# Patient Record
Sex: MALE | Race: WHITE
[De-identification: names, ages, dates, MRNs, and addresses within clinical notes are randomized per-mention and may not be internally consistent; named-entity substitution may affect disease eponyms.]

---

## 2017-09-05 ENCOUNTER — RX RENEWAL (OUTPATIENT)
Age: 82
End: 2017-09-05

## 2017-09-18 ENCOUNTER — NON-APPOINTMENT (OUTPATIENT)
Age: 82
End: 2017-09-18

## 2017-09-18 ENCOUNTER — APPOINTMENT (OUTPATIENT)
Dept: CARDIOLOGY | Facility: CLINIC | Age: 82
End: 2017-09-18

## 2017-09-18 VITALS
HEART RATE: 56 BPM | DIASTOLIC BLOOD PRESSURE: 66 MMHG | BODY MASS INDEX: 22.9 KG/M2 | OXYGEN SATURATION: 100 % | SYSTOLIC BLOOD PRESSURE: 124 MMHG | WEIGHT: 160 LBS | HEIGHT: 70 IN

## 2017-10-09 ENCOUNTER — APPOINTMENT (OUTPATIENT)
Dept: CARDIOLOGY | Facility: CLINIC | Age: 82
End: 2017-10-09
Payer: MEDICARE

## 2017-10-09 VITALS
HEART RATE: 57 BPM | WEIGHT: 160 LBS | BODY MASS INDEX: 22.9 KG/M2 | SYSTOLIC BLOOD PRESSURE: 126 MMHG | DIASTOLIC BLOOD PRESSURE: 64 MMHG | HEIGHT: 70 IN

## 2017-10-09 VITALS — RESPIRATION RATE: 100 BRPM

## 2017-10-09 PROCEDURE — 99215 OFFICE O/P EST HI 40 MIN: CPT

## 2017-10-09 PROCEDURE — 93000 ELECTROCARDIOGRAM COMPLETE: CPT

## 2017-10-17 ENCOUNTER — FORM ENCOUNTER (OUTPATIENT)
Age: 82
End: 2017-10-17

## 2017-10-18 ENCOUNTER — OUTPATIENT (OUTPATIENT)
Dept: OUTPATIENT SERVICES | Facility: HOSPITAL | Age: 82
LOS: 1 days | Discharge: HOME | End: 2017-10-18
Payer: COMMERCIAL

## 2017-10-18 DIAGNOSIS — R01.1 CARDIAC MURMUR, UNSPECIFIED: ICD-10-CM

## 2017-10-18 PROCEDURE — 93306 TTE W/DOPPLER COMPLETE: CPT | Mod: 26

## 2017-12-07 ENCOUNTER — TRANSCRIPTION ENCOUNTER (OUTPATIENT)
Age: 82
End: 2017-12-07

## 2018-02-15 ENCOUNTER — MEDICATION RENEWAL (OUTPATIENT)
Age: 83
End: 2018-02-15

## 2018-02-16 ENCOUNTER — MEDICATION RENEWAL (OUTPATIENT)
Age: 83
End: 2018-02-16

## 2018-02-26 ENCOUNTER — MEDICATION RENEWAL (OUTPATIENT)
Age: 83
End: 2018-02-26

## 2018-10-16 ENCOUNTER — APPOINTMENT (OUTPATIENT)
Dept: CARDIOLOGY | Facility: CLINIC | Age: 83
End: 2018-10-16

## 2018-11-27 ENCOUNTER — APPOINTMENT (OUTPATIENT)
Dept: CARDIOLOGY | Facility: CLINIC | Age: 83
End: 2018-11-27
Payer: MEDICARE

## 2018-11-27 ENCOUNTER — NON-APPOINTMENT (OUTPATIENT)
Age: 83
End: 2018-11-27

## 2018-11-27 VITALS
WEIGHT: 145 LBS | BODY MASS INDEX: 20.76 KG/M2 | HEART RATE: 58 BPM | HEIGHT: 70 IN | OXYGEN SATURATION: 100 % | DIASTOLIC BLOOD PRESSURE: 73 MMHG | SYSTOLIC BLOOD PRESSURE: 128 MMHG

## 2018-11-27 PROCEDURE — 93000 ELECTROCARDIOGRAM COMPLETE: CPT

## 2018-11-27 PROCEDURE — 99214 OFFICE O/P EST MOD 30 MIN: CPT

## 2018-11-27 NOTE — PHYSICAL EXAM
[General Appearance - Well Developed] : well developed [Normal Appearance] : normal appearance [Well Groomed] : well groomed [General Appearance - Well Nourished] : well nourished [No Deformities] : no deformities [General Appearance - In No Acute Distress] : no acute distress [FreeTextEntry1] : significant weight loss [Normal Conjunctiva] : the conjunctiva exhibited no abnormalities [Eyelids - No Xanthelasma] : the eyelids demonstrated no xanthelasmas [Normal Oral Mucosa] : normal oral mucosa [No Oral Pallor] : no oral pallor [No Oral Cyanosis] : no oral cyanosis [Normal Jugular Venous A Waves Present] : normal jugular venous A waves present [Normal Jugular Venous V Waves Present] : normal jugular venous V waves present [No Jugular Venous [place] A Waves] : no jugular venous [place] A waves [Respiration, Rhythm And Depth] : normal respiratory rhythm and effort [Exaggerated Use Of Accessory Muscles For Inspiration] : no accessory muscle use [Auscultation Breath Sounds / Voice Sounds] : lungs were clear to auscultation bilaterally [Heart Rate And Rhythm] : heart rate and rhythm were normal [Heart Sounds] : normal S1 and S2 [Systolic grade ___/6] : A grade [unfilled]/6 systolic murmur was heard. [Abdomen Soft] : soft [Abdomen Tenderness] : non-tender [Abdomen Mass (___ Cm)] : no abdominal mass palpated [Abnormal Walk] : normal gait [Gait - Sufficient For Exercise Testing] : the gait was sufficient for exercise testing [Nail Clubbing] : no clubbing of the fingernails [Cyanosis, Localized] : no localized cyanosis [Petechial Hemorrhages (___cm)] : no petechial hemorrhages [Skin Color & Pigmentation] : normal skin color and pigmentation [] : no rash [No Venous Stasis] : no venous stasis [Skin Lesions] : no skin lesions [No Skin Ulcers] : no skin ulcer [No Xanthoma] : no  xanthoma was observed [Oriented To Time, Place, And Person] : oriented to person, place, and time [Affect] : the affect was normal [Mood] : the mood was normal [No Anxiety] : not feeling anxious

## 2018-11-27 NOTE — DISCUSSION/SUMMARY
[FreeTextEntry1] : Stable, but clearly in a state of mourning for his wife.  Will see in 4-6 months.

## 2018-11-27 NOTE — HISTORY OF PRESENT ILLNESS
[FreeTextEntry1] : Patient s/p multivessel bypass.  No recurrent ischemic symptoms.  Recently lost his wife.

## 2018-11-27 NOTE — REASON FOR VISIT
[Follow-Up - Clinic] : a clinic follow-up of [Coronary Artery Disease] : coronary artery disease [Hyperlipidemia] : hyperlipidemia

## 2019-02-19 ENCOUNTER — APPOINTMENT (OUTPATIENT)
Dept: CARDIOLOGY | Facility: CLINIC | Age: 84
End: 2019-02-19
Payer: MEDICARE

## 2019-02-19 VITALS
HEIGHT: 70 IN | WEIGHT: 145 LBS | OXYGEN SATURATION: 100 % | HEART RATE: 59 BPM | DIASTOLIC BLOOD PRESSURE: 80 MMHG | BODY MASS INDEX: 20.76 KG/M2 | SYSTOLIC BLOOD PRESSURE: 128 MMHG

## 2019-02-19 PROCEDURE — 99214 OFFICE O/P EST MOD 30 MIN: CPT

## 2019-02-19 PROCEDURE — 93000 ELECTROCARDIOGRAM COMPLETE: CPT

## 2019-02-19 NOTE — HISTORY OF PRESENT ILLNESS
[FreeTextEntry1] : Patient with CAD, hypertension and hyperlipidemia.  S/P CABG.  Denies chest pain or dyspnea but has fatigue and memory loss.

## 2019-04-03 ENCOUNTER — APPOINTMENT (OUTPATIENT)
Dept: ORTHOPEDIC SURGERY | Facility: CLINIC | Age: 84
End: 2019-04-03
Payer: MEDICARE

## 2019-04-03 VITALS
WEIGHT: 160 LBS | TEMPERATURE: 97.7 F | HEIGHT: 70 IN | HEART RATE: 64 BPM | SYSTOLIC BLOOD PRESSURE: 144 MMHG | DIASTOLIC BLOOD PRESSURE: 75 MMHG | BODY MASS INDEX: 22.9 KG/M2

## 2019-04-03 DIAGNOSIS — M25.561 PAIN IN RIGHT KNEE: ICD-10-CM

## 2019-04-03 DIAGNOSIS — Z78.9 OTHER SPECIFIED HEALTH STATUS: ICD-10-CM

## 2019-04-03 PROCEDURE — 99214 OFFICE O/P EST MOD 30 MIN: CPT

## 2019-04-03 PROCEDURE — 73560 X-RAY EXAM OF KNEE 1 OR 2: CPT | Mod: RT

## 2019-04-18 ENCOUNTER — APPOINTMENT (OUTPATIENT)
Dept: ORTHOPEDIC SURGERY | Facility: CLINIC | Age: 84
End: 2019-04-18
Payer: MEDICARE

## 2019-04-18 PROCEDURE — 20610 DRAIN/INJ JOINT/BURSA W/O US: CPT | Mod: RT

## 2019-04-25 ENCOUNTER — APPOINTMENT (OUTPATIENT)
Dept: ORTHOPEDIC SURGERY | Facility: CLINIC | Age: 84
End: 2019-04-25
Payer: MEDICARE

## 2019-04-25 PROCEDURE — 20610 DRAIN/INJ JOINT/BURSA W/O US: CPT | Mod: RT

## 2019-04-25 NOTE — ADDENDUM
[FreeTextEntry1] : This note was written by Nusrat Lizama on 04/25/2019 acting as scribe for Isidro Samuel III, MD\par

## 2019-04-25 NOTE — PHYSICAL EXAM
[de-identified] : Left Knee: \par Range of Motion in Degrees	\par 	                  Claimant:	Normal:	\par Flexion Active	  135 	                135-degrees	\par Flexion Passive	  135	                135-degrees	\par Extension Active	  0-5	                0-5-degrees	\par Extension Passive	  0-5	                0-5-degrees	\par \par No weakness to flexion/extension.  No evidence of instability in the AP plane or varus or valgus stress.  Negative  Lachman.  Negative pivot shift.  Negative anterior drawer test.  Negative posterior drawer test.  Negative Tom.  Negative Apley grind.  No medial or lateral joint line tenderness.  No tenderness over the medial and lateral facet of the patella.  No patellofemoral crepitations.  No lateral tilting patella.  No patellar apprehension.  No crepitation in the medial and lateral femoral condyle.  No proximal or distal swelling, edema or tenderness.  No gross motor or sensory deficits.  No intra-articular swelling.  2+ DP and PT pulses. No varus or valgus malalignment.  Skin is intact.  No rashes, scars or lesions.  \par \par Right Knee: \par Range of Motion in Degrees	\par 	                  Claimant:	Normal:	\par Flexion Active	  130 	                130-degrees	\par Flexion Passive	  130	                130-degrees	\par Extension Active	  10	                0-5-degrees	\par Extension Passive	  10	                0-5-degrees	\par \par No weakness to flexion/extension.  No evidence of instability in the AP plane or varus or valgus stress.  Negative  Lachman.  Negative pivot shift.  Negative anterior drawer test.  Negative posterior drawer test.  Negative Tom.  Negative Apley grind.  No medial or lateral joint line tenderness.  Positive tenderness over the medial and lateral facet of the patella.  Positive patellofemoral crepitations.  No lateral tilting patella.  No patella apprehension.  Positive crepitation in the medial and lateral femoral condyle.  No proximal or distal swelling, edema or tenderness.  No gross motor or sensory deficits.  Mild intra-articular swelling.  2+ DP and PT pulses.  Moderate varus deformity.  Skin is intact.  No rashes, scars or lesions.  \par \par    [de-identified] : Ambulating with a slightly antalgic to antalgic gait.  Station:  Normal.  [de-identified] : Appearance:  Well-developed, well-nourished male in no acute distress.\par \par   [de-identified] : Radiographs, two views of right knee, show bone-on-bone medial compartment arthritis.

## 2019-04-25 NOTE — HISTORY OF PRESENT ILLNESS
[(Does not interfere) 0] : the ailment interference is 0/10 (does not interfere) [4] : the ailment interference is 4/10 [5] : the ailment interference is 5/10 [de-identified] : The patient comes in today with increasing complaints to his right knee.  He has been treated elsewhere with rest, ice and anti-inflammatories, but the pain has persisted.  The patient states the onset/injury occurred on 4/3/18.  This injury is not work related or due to an automobile accident.  The patient states the pain is intermittent.  The patient describes the pain as achy.   [] : No

## 2019-04-25 NOTE — ADDENDUM
[FreeTextEntry1] : This note was written by Eloina Dunbar on 04/09/2019 acting as a scribe for SABRINA TRENT

## 2019-04-25 NOTE — PROCEDURE
[de-identified] : JAYLAN MARTINEZ comes in today for the first Supartz injection to the right knee.  \par \par Physical Examination:\par Right Knee:\par Knee: Range of Motion in Degrees  	\par    	                        Claimant:  	            Normal:  	\par Flexion Active   	         130     	            135-degrees  	\par Flexion Passive  	         130   	            135-degrees  	\par Extension Active  	         10   	            0-5-degrees  	\par Extension Passive            10   	            0-5-degrees  	\par \par No weakness to flexion/extension. No evidence of instability in the AP plane or varus or valgus stress. Negative Lachman. Negative pivot shift. Negative anterior drawer test. Negative posterior drawer test. Negative Tom. Negative Apley grind. No medial or lateral joint line tenderness. Positive tenderness over the medial and lateral facet of the patella. Positive patellofemoral crepitations. No lateral tilting patella. No patella apprehension. Positive crepitation in the medial and lateral femoral condyle. No proximal or distal swelling, edema or tenderness. No gross motor or sensory deficits. Mild intra-articular swelling. 2+ DP and PT pulses. Moderate varus deformity. Skin is intact. No rashes, scars or lesions. \par \par Impression: \par Osteoarthritis of the right knee\par \par Consent:\par The risks and benefits of the procedure were discussed with the patient in detail.  Upon verbal consent of the patient, we proceeded with the Supartz injection as noted below.  \par \par Procedure:\par After sterile prep, the patient underwent a Supartz injection of 25 mg of Sodium Hyaluronate in a 2ML syringe into the right knee.  The patient tolerated the procedure well.  There were no complications.  \par \par NDC #:  [address]-8\par LOT #:  [id number]\par EXPIRATION: 11/30/21\par \par Plan:\par I have recommended ice and elevation.  The patient will be reassessed in one week for the next Supartz injection for the right knee osteoarthritis.

## 2019-05-01 NOTE — ADDENDUM
[FreeTextEntry1] : This note was written by Luis Ribeiro on 04/30/2019, acting as a scribe for Isidro Samuel III, MD

## 2019-05-01 NOTE — PROCEDURE
[de-identified] : JAYLAN MARTINEZ comes in today for the second Supartz injection to the right knee.  \par \par Physical Examination:\par Right Knee:\par Knee: Range of Motion in Degrees  	\par    	                        Claimant:  	            Normal:  	\par Flexion Active   	         130   	            135-degrees  	\par Flexion Passive  	         130  	            135-degrees  	\par Extension Active  	         10   	            0-5-degrees  	\par Extension Passive            10    	            0-5-degrees  	\par \par No evidence of instability in the AP plane or varus or valgus stress.  Negative  Lachman. Negative pivot shift.  Negative anterior drawer test.  Negative posterior drawer test. Negative Tom.  Negative Apley grind.  No medial or lateral joint line tenderness. Positive tenderness over the medial and lateral facet of the patella.  Positive patellofemoral crepitations.  No lateral tilting patella.  No patellar apprehension. Positive crepitation in the medial and lateral femoral condyle.  No proximal or distal swelling, edema or tenderness.  No gross motor or sensory deficits.  Mild intra-articular swelling.  Moderate varus deformity.  2+ DP and PT pulses.  Skin is intact.  No rashes, scars or lesions.  \par \par Impression: \par Osteoarthritis of the right knee\par \par Consent:\par The risks and benefits of the procedure were discussed with the patient in detail.  Upon verbal consent of the patient, we proceeded with the Supartz injection as noted below.  \par \par Procedure:\par After sterile prep, the patient underwent a Supartz injection of 25 mg of Sodium Hyaluronate in a 2ML syringe into the right knee.  The patient tolerated the procedure well.  There were no complications.  \par \par NDC#:  [address]-8\par Lot#:    [id number]\par Exp Date:  11/30/21\par \par Plan:\par I have recommended ice and elevation.  The patient will be reassessed in one week for the next Supartz injection for the right knee osteoarthritis.   \par \par \par

## 2019-05-02 ENCOUNTER — APPOINTMENT (OUTPATIENT)
Dept: ORTHOPEDIC SURGERY | Facility: CLINIC | Age: 84
End: 2019-05-02
Payer: MEDICARE

## 2019-05-02 PROCEDURE — 20610 DRAIN/INJ JOINT/BURSA W/O US: CPT | Mod: RT

## 2019-05-09 ENCOUNTER — APPOINTMENT (OUTPATIENT)
Dept: ORTHOPEDIC SURGERY | Facility: CLINIC | Age: 84
End: 2019-05-09
Payer: MEDICARE

## 2019-05-09 PROCEDURE — 20610 DRAIN/INJ JOINT/BURSA W/O US: CPT | Mod: RT

## 2019-05-10 NOTE — ADDENDUM
[FreeTextEntry1] : This note was written by Nusrat Lizama on 05/10/2019 acting as scribe for Linda Way, EMELINA/MINA, PA\par

## 2019-05-10 NOTE — PROCEDURE
[de-identified] : JAYLAN MARTINEZ comes in today for the fourth Supartz injection to the right knee.  \par \par Physical Examination:\par Right Knee:\par Knee: Range of Motion in Degrees 	\par  	 Claimant: 	 Normal: 	\par Flexion Active 	 130 	 135-degrees 	\par Flexion Passive 	 130 	 135-degrees 	\par Extension Active 	 10 	 0-5-degrees 	\par Extension Passive 10 	 0-5-degrees 	\par \par No evidence of instability in the AP plane or varus or valgus stress. Negative Lachman. Negative pivot shift. Negative anterior drawer test. Negative posterior drawer test. Negative Tom. Negative Apley grind. No medial or lateral joint line tenderness. Positive tenderness over the medial and lateral facet of the patella. Positive patellofemoral crepitations. No lateral tilting patella. No patellar apprehension. Positive crepitation in the medial and lateral femoral condyle. No proximal or distal swelling, edema or tenderness. No gross motor or sensory deficits. Mild intra-articular swelling. Moderate varus deformity. 2+ DP and PT pulses. Skin is intact. No rashes, scars or lesions. \par \par Impression: \par Osteoarthritis of the right knee\par \par Consent:\par The risks and benefits of the procedure were discussed with the patient in detail.  Upon verbal consent of the patient, we proceeded with the Supartz injection as noted below.  \par \par Procedure:\par After sterile prep, the patient underwent a Supartz injection of 25 mg of Sodium Hyaluronate in a 2ML syringe into the right knee.  The patient tolerated the procedure well.  There were no complications.  \par \par NDC #:  [address]-8\par LOT #:  [id number]\par EXPIRATION:  11/30/21\par \par Plan:\par I have recommended ice and elevation.  The patient will be reassessed in one week for the next Supartz injection for the right knee osteoarthritis.

## 2019-05-13 NOTE — PROCEDURE
[de-identified] : JAYLAN MARTINEZ comes in today for the third Supartz injection to the right knee.  \par \par Physical Examination:\par Right Knee:\par Knee: Range of Motion in Degrees  	\par    	                        Claimant:  	            Normal:  	\par Flexion Active   	         130   	            135-degrees  	\par Flexion Passive  	         130  	            135-degrees  	\par Extension Active  	         10   	            0-5-degrees  	\par Extension Passive            10    	            0-5-degrees  	\par \par No evidence of instability in the AP plane or varus or valgus stress.  Negative  Lachman. Negative pivot shift.  Negative anterior drawer test.  Negative posterior drawer test. Negative Tom.  Negative Apley grind.  No medial or lateral joint line tenderness. Positive tenderness over the medial and lateral facet of the patella.  Positive patellofemoral crepitations.  No lateral tilting patella.  No patellar apprehension. Positive crepitation in the medial and lateral femoral condyle.  No proximal or distal swelling, edema or tenderness.  No gross motor or sensory deficits.  Mild intra-articular swelling.  Moderate varus deformity.  2+ DP and PT pulses.  Skin is intact.  No rashes, scars or lesions.  \par \par Impression: \par Osteoarthritis of the right knee\par \par Consent:\par The risks and benefits of the procedure were discussed with the patient in detail.  Upon verbal consent of the patient, we proceeded with the Supartz injection as noted below.  \par \par Procedure:\par After sterile prep, the patient underwent a Supartz injection of 25 mg of Sodium Hyaluronate in a 2ML syringe into the right knee.  The patient tolerated the procedure well.  There were no complications.  \par \par NDC#:  [address]-4\par Lot#:    [id number]\par Exp Date:  11/30/21\par \par Plan:\par I have recommended ice and elevation.  The patient will be reassessed in one week for the next Supartz injection for the right knee osteoarthritis.   \par \par \par

## 2019-05-13 NOTE — ADDENDUM
[FreeTextEntry1] : This note was written by Brittany Hernandez on 05/07/2019 acting as scribe for Isidro Samuel III, MD\par

## 2019-05-20 ENCOUNTER — APPOINTMENT (OUTPATIENT)
Dept: ORTHOPEDIC SURGERY | Facility: CLINIC | Age: 84
End: 2019-05-20
Payer: MEDICARE

## 2019-05-20 PROCEDURE — 20610 DRAIN/INJ JOINT/BURSA W/O US: CPT | Mod: RT

## 2019-05-21 NOTE — ADDENDUM
[FreeTextEntry1] : This note was written by Luis Ribeiro on 05/21/2019, acting as a scribe for Isidro Samuel III, MD

## 2019-05-21 NOTE — PROCEDURE
[de-identified] : JAYLAN MARTINEZ comes in today for the fifth Supartz injection to the right knee.  \par \par Physical Examination:\par Right Knee:\par Knee: Range of Motion in Degrees  	\par    	                        Claimant:  	            Normal:  	\par Flexion Active   	         130     	            135-degrees  	\par Flexion Passive  	         130  	            135-degrees  	\par Extension Active  	         10  	                            0-5-degrees  	\par Extension Passive            10  	                            0-5-degrees  	\par \par No evidence of instability in the AP plane or varus or valgus stress.  Negative  Lachman. Negative pivot shift.  Negative anterior drawer test.  Negative posterior drawer test. Negative Tom.  Negative Apley grind.  No medial or lateral joint line tenderness. Positive tenderness over the medial and lateral facet of the patella.  Positive patellofemoral crepitations.  No lateral tilting patella.  No patellar apprehension. Positive crepitation in the medial and lateral femoral condyle.  No proximal or distal swelling, edema or tenderness.  No gross motor or sensory deficits.  Mild intra-articular swelling.  Moderate varus deformity.  2+ DP and PT pulses.  Skin is intact.  No rashes, scars or lesions.  \par \par Impression: \par Osteoarthritis of the right knee\par \par Consent:\par The risks and benefits of the procedure were discussed with the patient in detail.  Upon verbal consent of the patient, we proceeded with the Supartz injection as noted below.  \par \par Procedure:\par After sterile prep, the patient underwent a Supartz injection of 25 mg of Sodium Hyaluronate in a 2 mL syringe into the right knee.  The patient tolerated the procedure well.  There were no complications.  \par \par NDC#:  [address]-9\par Lot#:    [id number]\par Exp Date:   11/30/21\par \par Plan:\par I have recommended ice and elevation.  The patient will be reassessed in 6-8 weeks for the right knee osteoarthritis.   \par \par \par \par

## 2019-07-15 ENCOUNTER — APPOINTMENT (OUTPATIENT)
Dept: ORTHOPEDIC SURGERY | Facility: CLINIC | Age: 84
End: 2019-07-15
Payer: MEDICARE

## 2019-07-15 VITALS
BODY MASS INDEX: 21.47 KG/M2 | WEIGHT: 150 LBS | DIASTOLIC BLOOD PRESSURE: 80 MMHG | SYSTOLIC BLOOD PRESSURE: 132 MMHG | HEART RATE: 91 BPM | HEIGHT: 70 IN | TEMPERATURE: 97.7 F

## 2019-07-15 PROCEDURE — 73560 X-RAY EXAM OF KNEE 1 OR 2: CPT | Mod: RT

## 2019-07-15 PROCEDURE — 20610 DRAIN/INJ JOINT/BURSA W/O US: CPT | Mod: RT

## 2019-07-23 NOTE — HISTORY OF PRESENT ILLNESS
[de-identified] : The patient comes in today stating he had good relief after the visco injections, but states the pain is starting to come back.

## 2019-07-23 NOTE — PHYSICAL EXAM
[de-identified] : \par Right Knee: \par Range of Motion in Degrees	\par 	  Claimant:	Normal:	\par Flexion Active	 130 	 130-degrees	\par Flexion Passive	 130	 130-degrees	\par Extension Active	 10	 0-5-degrees	\par Extension Passive	 10	 0-5-degrees	\par \par No weakness to flexion/extension. No evidence of instability in the AP plane or varus or valgus stress. Negative Lachman. Negative pivot shift. Negative anterior drawer test. Negative posterior drawer test. Negative Tom. Negative Apley grind. No medial or lateral joint line tenderness. Positive tenderness over the medial and lateral facet of the patella. Positive patellofemoral crepitations. No lateral tilting patella. No patella apprehension. Positive crepitation in the medial and lateral femoral condyle. No proximal or distal swelling, edema or tenderness. No gross motor or sensory deficits. Mild intra-articular swelling. 2+ DP and PT pulses. Moderate varus deformity. Skin is intact. No rashes, scars or lesions. \par  [de-identified] : Gait and Station:  Ambulating with a slightly antalgic to antalgic gait.  Station:  Normal.  [de-identified] : Radiographs, one to two views of the right knee, show moderate osteoarthritis. [de-identified] : Appearance:  Well-developed, well-nourished male in no acute distress.\par

## 2019-07-23 NOTE — DISCUSSION/SUMMARY
[de-identified] : At this time, due to osteoarthritis of the right knee, a cortisone injection was done today in the office.  The patient will ice and elevate the right knee on and off for the next couple of days.  The patient will take NSAID as needed if pain should increase.  He will follow up in the office in 6-8 weeks.  At that point, if he is having recurrence of pain, we will order viscosupplementation injections.

## 2019-07-23 NOTE — ADDENDUM
[FreeTextEntry1] : This note was dictated by Morenita Corado, RPAC \par \par This note was written by Anni Garcia on 07/18/2019 acting as scribe for Isidro Samuel III, MD

## 2019-09-19 ENCOUNTER — APPOINTMENT (OUTPATIENT)
Dept: ORTHOPEDIC SURGERY | Facility: CLINIC | Age: 84
End: 2019-09-19
Payer: MEDICARE

## 2019-09-19 VITALS
BODY MASS INDEX: 21.47 KG/M2 | WEIGHT: 150 LBS | SYSTOLIC BLOOD PRESSURE: 128 MMHG | TEMPERATURE: 98.2 F | HEART RATE: 87 BPM | HEIGHT: 70 IN | DIASTOLIC BLOOD PRESSURE: 71 MMHG

## 2019-09-19 PROCEDURE — 99213 OFFICE O/P EST LOW 20 MIN: CPT

## 2019-09-19 RX ORDER — DICLOFENAC SODIUM 10 MG/G
1 GEL TOPICAL
Qty: 1 | Refills: 0 | Status: ACTIVE | COMMUNITY
Start: 2019-09-19 | End: 1900-01-01

## 2019-09-30 NOTE — ADDENDUM
[FreeTextEntry1] : This note was written by Nusrat Lizama on 09/29/2019 acting as scribe for Isidro Samuel III, MD

## 2019-09-30 NOTE — DISCUSSION/SUMMARY
[de-identified] : At this time, he was given a script for topical anti-inflammatory cream for the right knee osteoarthritis.  I have recommended repeat viscosupplementation to be performed after 11/20/19.

## 2019-09-30 NOTE — PHYSICAL EXAM
[de-identified] : Right Knee: \par Range of Motion in Degrees	\par 	 Claimant:	Normal:	\par Flexion Active	 130 	 130-degrees	\par Flexion Passive	 130	 130-degrees	\par Extension Active	 10	 0-5-degrees	\par Extension Passive	 10	 0-5-degrees	\par \par No weakness to flexion/extension. No evidence of instability in the AP plane or varus or valgus stress. Negative Lachman. Negative pivot shift. Negative anterior drawer test. Negative posterior drawer test. Negative Tom. Negative Apley grind. No medial or lateral joint line tenderness. Positive tenderness over the medial and lateral facet of the patella. Positive patellofemoral crepitations. No lateral tilting patella. No patella apprehension. Positive crepitation in the medial and lateral femoral condyle. No proximal or distal swelling, edema or tenderness. No gross motor or sensory deficits. Mild intra-articular swelling. 2+ DP and PT pulses. Moderate varus deformity. Skin is intact. No rashes, scars or lesions. \par  [de-identified] : Ambulating with a slightly antalgic to antalgic gait.  Station:  Normal.  [de-identified] : General Appearance:  Well-developed, well-nourished male in no acute distress.

## 2019-09-30 NOTE — HISTORY OF PRESENT ILLNESS
[de-identified] : The patient comes in today for his right knee.  He states he definitely got some relief but he is still having some pain.

## 2019-10-03 ENCOUNTER — APPOINTMENT (OUTPATIENT)
Dept: ORTHOPEDIC SURGERY | Facility: CLINIC | Age: 84
End: 2019-10-03
Payer: MEDICARE

## 2019-10-03 PROCEDURE — 20610 DRAIN/INJ JOINT/BURSA W/O US: CPT | Mod: RT

## 2019-10-08 ENCOUNTER — APPOINTMENT (OUTPATIENT)
Dept: CARDIOLOGY | Facility: CLINIC | Age: 84
End: 2019-10-08
Payer: MEDICARE

## 2019-10-08 ENCOUNTER — NON-APPOINTMENT (OUTPATIENT)
Age: 84
End: 2019-10-08

## 2019-10-08 VITALS
HEART RATE: 69 BPM | SYSTOLIC BLOOD PRESSURE: 138 MMHG | WEIGHT: 150 LBS | HEIGHT: 70 IN | DIASTOLIC BLOOD PRESSURE: 58 MMHG | OXYGEN SATURATION: 98 % | BODY MASS INDEX: 21.47 KG/M2

## 2019-10-08 DIAGNOSIS — R01.1 CARDIAC MURMUR, UNSPECIFIED: ICD-10-CM

## 2019-10-08 PROCEDURE — 99214 OFFICE O/P EST MOD 30 MIN: CPT

## 2019-10-08 PROCEDURE — 93000 ELECTROCARDIOGRAM COMPLETE: CPT

## 2019-10-08 NOTE — HISTORY OF PRESENT ILLNESS
[FreeTextEntry1] : Patient continues to complain of memory loss.  No chest pain or dyspnea on exertion.

## 2019-10-08 NOTE — REASON FOR VISIT
[Follow-Up - Clinic] : a clinic follow-up of [Coronary Artery Disease] : coronary artery disease [FreeTextEntry1] : AI

## 2019-10-08 NOTE — PHYSICAL EXAM
[Normal Appearance] : normal appearance [General Appearance - Well Developed] : well developed [General Appearance - Well Nourished] : well nourished [Well Groomed] : well groomed [General Appearance - In No Acute Distress] : no acute distress [FreeTextEntry1] : significant weight loss [No Deformities] : no deformities [Normal Conjunctiva] : the conjunctiva exhibited no abnormalities [Normal Oral Mucosa] : normal oral mucosa [Eyelids - No Xanthelasma] : the eyelids demonstrated no xanthelasmas [No Oral Cyanosis] : no oral cyanosis [No Oral Pallor] : no oral pallor [Normal Jugular Venous A Waves Present] : normal jugular venous A waves present [Normal Jugular Venous V Waves Present] : normal jugular venous V waves present [No Jugular Venous [place] A Waves] : no jugular venous [place] A waves [Respiration, Rhythm And Depth] : normal respiratory rhythm and effort [Exaggerated Use Of Accessory Muscles For Inspiration] : no accessory muscle use [Auscultation Breath Sounds / Voice Sounds] : lungs were clear to auscultation bilaterally [Heart Rate And Rhythm] : heart rate and rhythm were normal [Heart Sounds] : normal S1 and S2 [Systolic grade ___/6] : A grade [unfilled]/6 systolic murmur was heard. [Abdomen Soft] : soft [Diastolic Grade ___/4] : A grade [unfilled]/4 diastolic murmur was heard. [Abdomen Tenderness] : non-tender [Abnormal Walk] : normal gait [Abdomen Mass (___ Cm)] : no abdominal mass palpated [Nail Clubbing] : no clubbing of the fingernails [Cyanosis, Localized] : no localized cyanosis [Gait - Sufficient For Exercise Testing] : the gait was sufficient for exercise testing [Petechial Hemorrhages (___cm)] : no petechial hemorrhages [Skin Color & Pigmentation] : normal skin color and pigmentation [] : no rash [Skin Lesions] : no skin lesions [No Venous Stasis] : no venous stasis [Oriented To Time, Place, And Person] : oriented to person, place, and time [No Skin Ulcers] : no skin ulcer [No Xanthoma] : no  xanthoma was observed [Mood] : the mood was normal [Affect] : the affect was normal [No Anxiety] : not feeling anxious

## 2019-10-08 NOTE — DISCUSSION/SUMMARY
[FreeTextEntry1] : [place]ico[place] continues to complain of memory loss.  No chest pain or other ischemic symptom.  Will d/c beta blocker after tapering.  Echocardiogram to evaluate AI and LV function.

## 2019-10-10 ENCOUNTER — APPOINTMENT (OUTPATIENT)
Dept: ORTHOPEDIC SURGERY | Facility: CLINIC | Age: 84
End: 2019-10-10
Payer: MEDICARE

## 2019-10-10 PROCEDURE — 20610 DRAIN/INJ JOINT/BURSA W/O US: CPT | Mod: RT

## 2019-10-10 NOTE — ADDENDUM
[FreeTextEntry1] : This note was written by Luis Ribeiro on 10/08/2019, acting as a scribe for Isidro Samuel III, MD

## 2019-10-10 NOTE — PROCEDURE
[de-identified] : JAYLAN MARTINEZ comes in today for the first Supartz injection to the right knee.  \par \par Physical Examination:\par Right Knee:\par Knee: Range of Motion in Degrees  	\par    	                        Claimant:  	            Normal:  	\par Flexion Active   	         130 	            135-degrees  	\par Flexion Passive  	         130 	            135-degrees  	\par Extension Active  	         10   	            0-5-degrees  	\par Extension Passive            10   	            0-5-degrees  	\par \par No weakness to flexion/extension.  No evidence of instability in the AP plane or varus or valgus stress.  Negative  Lachman. Negative pivot shift.  Negative anterior drawer test.  Negative posterior drawer test. Negative Tom.  Negative Apley grind.  No medial or lateral joint line tenderness. Positive tenderness over the medial and lateral facet of the patella.  Positive patellofemoral crepitations.  No lateral tilting patella.  No patellar apprehension. Positive crepitation in the medial and lateral femoral condyle.  No proximal or distal swelling, edema or tenderness.  No gross motor or sensory deficits.  Mild intra-articular swelling.  Moderate varus deformity.  2+ DP and PT pulses.  Skin is intact.  No rashes, scars or lesions.  \par \par Impression: \par Osteoarthritis of the right knee\par \par Consent:\par The risks and benefits of the procedure were discussed with the patient in detail.  Upon verbal consent of the patient, we proceeded with the Supartz injection as noted below.  \par \par Procedure:\par After sterile prep, the patient underwent a Supartz injection of 25 mg of Sodium Hyaluronate in a 2ML syringe into the right knee.  The patient tolerated the procedure well.  There were no complications.  \par \par NDC#:  [address]-9\par Lot#:    [id number]\par Exp Date:  06/30/22\par \par Plan:\par I have recommended ice and elevation.  The patient will be reassessed in one week for the next Supartz injection for the right knee osteoarthritis.   \par \par

## 2019-10-16 ENCOUNTER — APPOINTMENT (OUTPATIENT)
Dept: ORTHOPEDIC SURGERY | Facility: CLINIC | Age: 84
End: 2019-10-16
Payer: MEDICARE

## 2019-10-16 PROCEDURE — 20610 DRAIN/INJ JOINT/BURSA W/O US: CPT | Mod: RT

## 2019-10-16 NOTE — ADDENDUM
[FreeTextEntry1] : This note was written by Anni Garcia on 10/15/2019 acting as scribe for Isidro Samuel III, MD

## 2019-10-16 NOTE — PROCEDURE
[de-identified] : JAYLAN MARTINEZ comes in today for the second Supartz injection to the right knee.  \par \par Physical Examination:\par Right Knee:\par Knee: Range of Motion in Degrees  	\par    	                        Claimant:  	            Normal:  	\par Flexion Active   	         130 	            135-degrees  	\par Flexion Passive  	         130 	            135-degrees  	\par Extension Active  	         10   	            0-5-degrees  	\par Extension Passive            10   	            0-5-degrees  	\par \par No weakness to flexion/extension.  No evidence of instability in the AP plane or varus or valgus stress.  Negative  Lachman. Negative pivot shift.  Negative anterior drawer test.  Negative posterior drawer test. Negative Tom.  Negative Apley grind.  No medial or lateral joint line tenderness. Positive tenderness over the medial and lateral facet of the patella.  Positive patellofemoral crepitations.  No lateral tilting patella.  No patellar apprehension. Positive crepitation in the medial and lateral femoral condyle.  No proximal or distal swelling, edema or tenderness.  No gross motor or sensory deficits.  Mild intra-articular swelling.  Moderate varus deformity.  2+ DP and PT pulses.  Skin is intact.  No rashes, scars or lesions.  \par \par Impression: \par Osteoarthritis of the right knee\par \par Consent:\par The risks and benefits of the procedure were discussed with the patient in detail.  Upon verbal consent of the patient, we proceeded with the Supartz injection as noted below.  \par \par Procedure:\par After sterile prep, the patient underwent a Supartz injection of 25 mg of Sodium Hyaluronate in a 2ML syringe into the right knee.  The patient tolerated the procedure well.  There were no complications.  \par \par NDC#:  [address]-9\par Lot#:    [id number]\par Exp Date:  06/30/22\par \par Plan:\par I have recommended ice and elevation.  The patient will be reassessed in one week for the next Supartz injection for the right knee osteoarthritis.   \par \par

## 2019-10-23 NOTE — PROCEDURE
[de-identified] : JAYLAN MARTINEZ comes in today for the third Supartz injection to the right knee.  \par \par Physical Examination:\par Right Knee:\par Knee: Range of Motion in Degrees  	\par    	                        Claimant:  	            Normal:  	\par Flexion Active   	         130 	            135-degrees  	\par Flexion Passive  	         130 	            135-degrees  	\par Extension Active  	         10   	            0-5-degrees  	\par Extension Passive            10   	            0-5-degrees  	\par \par No weakness to flexion/extension.  No evidence of instability in the AP plane or varus or valgus stress.  Negative  Lachman. Negative pivot shift.  Negative anterior drawer test.  Negative posterior drawer test. Negative Tom.  Negative Apley grind.  No medial or lateral joint line tenderness. Positive tenderness over the medial and lateral facet of the patella.  Positive patellofemoral crepitations.  No lateral tilting patella.  No patellar apprehension. Positive crepitation in the medial and lateral femoral condyle.  No proximal or distal swelling, edema or tenderness.  No gross motor or sensory deficits.  Mild intra-articular swelling.  Moderate varus deformity.  2+ DP and PT pulses.  Skin is intact.  No rashes, scars or lesions.  \par \par Impression: \par Osteoarthritis of the right knee\par \par Consent:\par The risks and benefits of the procedure were discussed with the patient in detail.  Upon verbal consent of the patient, we proceeded with the Supartz injection as noted below.  \par \par Procedure:\par After sterile prep, the patient underwent a Supartz injection of 25 mg of Sodium Hyaluronate in a 2ML syringe into the right knee.  The patient tolerated the procedure well.  There were no complications.  \par \par NDC#:  [address]-5\par Lot#:    [id number]\par Exp Date:  06/30/22\par \par Plan:\par I have recommended ice and elevation.  The patient will be reassessed in one week for the next Supartz injection for the right knee osteoarthritis.   \par \par

## 2019-10-23 NOTE — ADDENDUM
[FreeTextEntry1] : This note was written by Anni Garcia on 10/23/2019 acting as scribe for Isidro Samuel III, MD

## 2019-10-24 ENCOUNTER — APPOINTMENT (OUTPATIENT)
Dept: ORTHOPEDIC SURGERY | Facility: CLINIC | Age: 84
End: 2019-10-24
Payer: MEDICARE

## 2019-10-24 PROCEDURE — 20610 DRAIN/INJ JOINT/BURSA W/O US: CPT | Mod: RT

## 2019-10-31 ENCOUNTER — APPOINTMENT (OUTPATIENT)
Dept: ORTHOPEDIC SURGERY | Facility: CLINIC | Age: 84
End: 2019-10-31
Payer: MEDICARE

## 2019-10-31 PROCEDURE — 20610 DRAIN/INJ JOINT/BURSA W/O US: CPT | Mod: RT

## 2019-10-31 NOTE — ADDENDUM
[FreeTextEntry1] : This note was written by Eloina Dunbar on 10/31/2019 acting as a scribe for SABRINA TRENT III, MD

## 2019-10-31 NOTE — PROCEDURE
[de-identified] : JAYLAN MARTINEZ comes in today for the fourth Supartz injection to the right knee.  \par \par Physical Examination:\par Right Knee:\par Range of Motion in Degrees  	\par    	                        Claimant:  	            Normal:  	\par Flexion Active   	         130 	            135-degrees  	\par Flexion Passive  	         130 	            135-degrees  	\par Extension Active  	         10   	            0-5-degrees  	\par Extension Passive            10   	            0-5-degrees  	\par \par No weakness to flexion/extension.  No evidence of instability in the AP plane or varus or valgus stress.  Negative  Lachman. Negative pivot shift.  Negative anterior drawer test.  Negative posterior drawer test. Negative Tom.  Negative Apley grind.  No medial or lateral joint line tenderness. Positive tenderness over the medial and lateral facet of the patella.  Positive patellofemoral crepitations.  No lateral tilting patella.  No patellar apprehension. Positive crepitation in the medial and lateral femoral condyle.  No proximal or distal swelling, edema or tenderness.  No gross motor or sensory deficits.  Mild intra-articular swelling.  Moderate varus deformity.  2+ DP and PT pulses.  Skin is intact.  No rashes, scars or lesions.  \par \par Impression: \par Osteoarthritis of the right knee\par \par Consent:\par The risks and benefits of the procedure were discussed with the patient in detail.  Upon verbal consent of the patient, we proceeded with the Supartz injection as noted below.  \par \par Procedure:\par After sterile prep, the patient underwent a Supartz injection of 25 mg of Sodium Hyaluronate in a 2ML syringe into the right knee.  The patient tolerated the procedure well.  There were no complications.  \par \par NDC#:  [address]-8\par Lot#:    [id number]\par Exp Date:  06/30/22\par \par Plan:\par I have recommended ice and elevation.  The patient will be reassessed in one week for the next Supartz injection for the right knee osteoarthritis.   \par \par

## 2019-11-12 NOTE — PROCEDURE
[de-identified] : JAYLAN MARTINEZ comes in today for the fifth Supartz injection to the right knee.  \par \par Physical Examination:\par Right Knee:\par Range of Motion in Degrees  	\par    	                        Claimant:  	            Normal:  	\par Flexion Active   	         130 	            135-degrees  	\par Flexion Passive  	         130 	            135-degrees  	\par Extension Active  	         10   	            0-5-degrees  	\par Extension Passive            10   	            0-5-degrees  	\par \par No weakness to flexion/extension.  No evidence of instability in the AP plane or varus or valgus stress.  Negative  Lachman. Negative pivot shift.  Negative anterior drawer test.  Negative posterior drawer test. Negative Tom.  Negative Apley grind.  No medial or lateral joint line tenderness. Positive tenderness over the medial and lateral facet of the patella.  Positive patellofemoral crepitations.  No lateral tilting patella.  No patellar apprehension. Positive crepitation in the medial and lateral femoral condyle.  No proximal or distal swelling, edema or tenderness.  No gross motor or sensory deficits.  Mild intra-articular swelling.  Moderate varus deformity.  2+ DP and PT pulses.  Skin is intact.  No rashes, scars or lesions.  \par \par Impression: \par Osteoarthritis of the right knee\par \par Consent:\par The risks and benefits of the procedure were discussed with the patient in detail.  Upon verbal consent of the patient, we proceeded with the Supartz injection as noted below.  \par \par Procedure:\par After sterile prep, the patient underwent a Supartz injection of 25 mg of Sodium Hyaluronate in a 2ML syringe into the right knee.  The patient tolerated the procedure well.  There were no complications.  \par \par NDC#:  [address]-9\par Lot#:    [id number]\par Exp Date:  06/30/22\par \par Plan:\par I have recommended ice and elevation.  The patient will be reassessed in six to eight weeks following this series of Supartz injections for the right knee osteoarthritis.

## 2019-11-12 NOTE — ADDENDUM
[FreeTextEntry1] : This note was written by Nusrat Lizama on 11/06/2019 acting as scribe for Isidro Samuel III, MD\par

## 2020-02-04 ENCOUNTER — NON-APPOINTMENT (OUTPATIENT)
Age: 85
End: 2020-02-04

## 2020-02-04 ENCOUNTER — APPOINTMENT (OUTPATIENT)
Dept: CARDIOLOGY | Facility: CLINIC | Age: 85
End: 2020-02-04
Payer: MEDICARE

## 2020-02-04 VITALS
HEART RATE: 68 BPM | BODY MASS INDEX: 21.47 KG/M2 | OXYGEN SATURATION: 96 % | SYSTOLIC BLOOD PRESSURE: 150 MMHG | HEIGHT: 70 IN | WEIGHT: 150 LBS | DIASTOLIC BLOOD PRESSURE: 79 MMHG

## 2020-02-04 PROCEDURE — 93000 ELECTROCARDIOGRAM COMPLETE: CPT

## 2020-02-04 PROCEDURE — 99213 OFFICE O/P EST LOW 20 MIN: CPT

## 2020-02-04 NOTE — HISTORY OF PRESENT ILLNESS
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  currently on 12.5mg Toprol.  Denies chest pain, dyspnea or exercise intolerance.

## 2020-02-04 NOTE — DISCUSSION/SUMMARY
[FreeTextEntry1] : Stop beta blocker.  Continue to monitor BP, he may need another antihypertensive.

## 2020-02-04 NOTE — PHYSICAL EXAM
[Well Groomed] : well groomed [Normal Appearance] : normal appearance [General Appearance - Well Developed] : well developed [General Appearance - Well Nourished] : well nourished [No Deformities] : no deformities [General Appearance - In No Acute Distress] : no acute distress [FreeTextEntry1] : significant weight loss [Normal Conjunctiva] : the conjunctiva exhibited no abnormalities [Eyelids - No Xanthelasma] : the eyelids demonstrated no xanthelasmas [Normal Oral Mucosa] : normal oral mucosa [No Oral Cyanosis] : no oral cyanosis [Normal Jugular Venous A Waves Present] : normal jugular venous A waves present [No Oral Pallor] : no oral pallor [No Jugular Venous [place] A Waves] : no jugular venous [place] A waves [Normal Jugular Venous V Waves Present] : normal jugular venous V waves present [Respiration, Rhythm And Depth] : normal respiratory rhythm and effort [Exaggerated Use Of Accessory Muscles For Inspiration] : no accessory muscle use [Heart Rate And Rhythm] : heart rate and rhythm were normal [Auscultation Breath Sounds / Voice Sounds] : lungs were clear to auscultation bilaterally [Systolic grade ___/6] : A grade [unfilled]/6 systolic murmur was heard. [Heart Sounds] : normal S1 and S2 [Diastolic Grade ___/4] : A grade [unfilled]/4 diastolic murmur was heard. [Abdomen Tenderness] : non-tender [Abdomen Soft] : soft [Abdomen Mass (___ Cm)] : no abdominal mass palpated [Abnormal Walk] : normal gait [Gait - Sufficient For Exercise Testing] : the gait was sufficient for exercise testing [Nail Clubbing] : no clubbing of the fingernails [Cyanosis, Localized] : no localized cyanosis [Petechial Hemorrhages (___cm)] : no petechial hemorrhages [Skin Color & Pigmentation] : normal skin color and pigmentation [Skin Lesions] : no skin lesions [] : no rash [No Venous Stasis] : no venous stasis [Oriented To Time, Place, And Person] : oriented to person, place, and time [No Skin Ulcers] : no skin ulcer [No Xanthoma] : no  xanthoma was observed [Affect] : the affect was normal [Mood] : the mood was normal [No Anxiety] : not feeling anxious

## 2020-03-02 ENCOUNTER — APPOINTMENT (OUTPATIENT)
Dept: ORTHOPEDIC SURGERY | Facility: CLINIC | Age: 85
End: 2020-03-02
Payer: MEDICARE

## 2020-03-02 VITALS
HEIGHT: 70 IN | SYSTOLIC BLOOD PRESSURE: 134 MMHG | HEART RATE: 72 BPM | WEIGHT: 150 LBS | DIASTOLIC BLOOD PRESSURE: 71 MMHG | TEMPERATURE: 98.1 F | BODY MASS INDEX: 21.47 KG/M2

## 2020-03-02 PROCEDURE — 99213 OFFICE O/P EST LOW 20 MIN: CPT

## 2020-03-05 NOTE — HISTORY OF PRESENT ILLNESS
[de-identified] : The patient comes in today with increasing complaints of pain to his right knee.

## 2020-03-05 NOTE — PHYSICAL EXAM
[de-identified] : Right Knee: \par Range of Motion in Degrees	\par 	              Claimant:	Normal:	\par Flexion Active	 130 	 130-degrees	\par Flexion Passive	 130	 130-degrees	\par Extension Active	 10	 0-5-degrees	\par Extension Passive	 10	 0-5-degrees	\par \par No weakness to flexion/extension. No evidence of instability in the AP plane or varus or valgus stress. Negative Lachman. Negative pivot shift. Negative anterior drawer test. Negative posterior drawer test. Negative Tom. Negative Apley grind. No medial or lateral joint line tenderness. Positive tenderness over the medial and lateral facet of the patella. Positive patellofemoral crepitations. No lateral tilting patella. No patella apprehension. Positive crepitation in the medial and lateral femoral condyle. No proximal or distal swelling, edema or tenderness. No gross motor or sensory deficits. Mild intra-articular swelling. 2+ DP and PT pulses. Moderate varus deformity. Skin is intact. No rashes, scars or lesions. \par  [de-identified] : Gait and Station:  Ambulating with a slightly antalgic to antalgic gait.  Normal Station.  [de-identified] : Appearance:  Well developed, well-nourished male in no acute distress.\par

## 2020-03-05 NOTE — DISCUSSION/SUMMARY
[de-identified] : At this time, due to right knee osteoarthritis, I recommended a repeat course of viscosupplementation since he has had such a good result.\par

## 2020-03-05 NOTE — ADDENDUM
[FreeTextEntry1] : This note was written by Luis Ribeiro on 03/03/2020, acting as a scribe for Isidro Samuel III, MD

## 2020-03-12 ENCOUNTER — APPOINTMENT (OUTPATIENT)
Dept: ORTHOPEDIC SURGERY | Facility: CLINIC | Age: 85
End: 2020-03-12
Payer: MEDICARE

## 2020-03-12 PROCEDURE — 20610 DRAIN/INJ JOINT/BURSA W/O US: CPT | Mod: RT

## 2020-03-17 NOTE — PROCEDURE
[de-identified] : \par Reason for Visit: \par JAYLAN MARTINEZ comes in today for a Durolane injection to the right knee.  \par \par Physical Examination:\par Right Knee:\par Knee: Range of Motion in Degrees  	\par    	                 Claimant:  	Normal:  	\par Flexion Active  	  130    	               135-degrees  	\par Flexion Passive  	  130 	               135-degrees  	\par Extension Active    	  10  	               0-5-degrees  	\par Extension Passive     10         	               0-5-degrees \par \par No weakness to flexion/extension.  No evidence of instability in the AP plane or varus or valgus stress.  Negative  Lachman. Negative pivot shift.  Negative anterior drawer test.  Negative posterior drawer test. Negative Tom.  Negative Apley grind.  No medial or lateral joint line tenderness. Positive tenderness over the medial and lateral facet of the patella.  Positive patellofemoral crepitations.  No lateral tilting patella.  No patellar apprehension. Positive crepitation in the medial and lateral femoral condyle.  No proximal or distal swelling, edema or tenderness.  No gross motor or sensory deficits.  Mild intra-articular swelling.  Moderate varus deformity.  2+ DP and PT pulses.  Skin is intact.  No rashes, scars or lesions.  \par \par Impression: \par Osteoarthritis of the right knee\par \par Consent:\par The risks and benefits of the procedure were discussed with the patient in detail.  Upon verbal consent of the patient, we proceeded with the Durolane injection as noted below.  \par \par Procedure:\par After sterile prep, the patient underwent a Durolane injection of 60 mg of Sodium Hyaluronate in a 3.0 mL syringe into the right knee.  The patient tolerated the procedure well.  There were no complications.  \par \par NDC#:  [address]-0\par Lot#:    [id number]\par Exp Date:  09/30/21\par \par Plan:\par I have recommended ice and elevation.  The patient will be reassessed in six to eight weeks following the Durolane injection for the right knee osteoarthritis.\par

## 2020-03-17 NOTE — ADDENDUM
[FreeTextEntry1] : This note was written by Luis Ribeiro on 03/13/2020, acting as a scribe for Isidro Samuel III, MD

## 2020-06-01 ENCOUNTER — APPOINTMENT (OUTPATIENT)
Dept: ORTHOPEDIC SURGERY | Facility: CLINIC | Age: 85
End: 2020-06-01
Payer: MEDICARE

## 2020-06-01 VITALS
TEMPERATURE: 98.6 F | HEIGHT: 70 IN | DIASTOLIC BLOOD PRESSURE: 73 MMHG | BODY MASS INDEX: 22.9 KG/M2 | HEART RATE: 61 BPM | SYSTOLIC BLOOD PRESSURE: 152 MMHG | WEIGHT: 160 LBS

## 2020-06-01 PROCEDURE — 73560 X-RAY EXAM OF KNEE 1 OR 2: CPT | Mod: RT

## 2020-06-01 PROCEDURE — 99213 OFFICE O/P EST LOW 20 MIN: CPT

## 2020-06-08 NOTE — PHYSICAL EXAM
[de-identified] : Right Knee: \par Range of Motion in Degrees	\par 	                  Claimant:	Normal:	\par Flexion Active	  120 	                135-degrees	\par Flexion Passive	  120	                135-degrees	\par Extension Active	  10	                0-5-degrees	\par Extension Passive	  10	                0-5-degrees	\par \par No weakness to flexion/extension. No evidence of instability in the AP plane or varus or valgus stress.  Negative  Lachman.  Negative pivot shift.  Negative anterior drawer test.  Negative posterior drawer test.  Negative Tom.  Negative Apley grind.  No medial or lateral joint line tenderness.  Positive tenderness over the medial and lateral facet of the patella.  Positive patellofemoral crepitations.  No lateral tilting patella.  No patella apprehension.  Positive crepitation in the medial and lateral femoral condyle.  No proximal or distal swelling, edema or tenderness.  No gross motor or sensory deficits. Mild intra-articular swelling.  2+ DP and PT pulses.  Moderate varus deformity.  No valgus malalignment.  Skin is intact.  No rashes, scars or lesions. \par  [de-identified] : Ambulating with a slightly antalgic to antalgic gait.  Station:  Normal.  [de-identified] : General Appearance:  Well-developed, well-nourished male in no acute distress. \par  [de-identified] : Radiographs, two views of the right knee, show bone-on-bone medial compartment arthritis.

## 2020-06-08 NOTE — DISCUSSION/SUMMARY
[de-identified] : At this time, I have recommended a course of physical therapy and topical anti-inflammatory cream for the right knee osteoarthritis.  He will be reassessed in four to six weeks.

## 2020-06-08 NOTE — HISTORY OF PRESENT ILLNESS
[de-identified] : The patient comes in today for his right knee.  He states he is still having complaints.  Some days are good and some days are not.

## 2020-06-08 NOTE — ADDENDUM
[FreeTextEntry1] : This note was written by Nusrat Lizama on 06/05/2020 acting as scribe for Isidro Samuel III, MD

## 2020-09-17 ENCOUNTER — APPOINTMENT (OUTPATIENT)
Dept: ORTHOPEDIC SURGERY | Facility: CLINIC | Age: 85
End: 2020-09-17
Payer: MEDICARE

## 2020-09-17 VITALS
BODY MASS INDEX: 22.9 KG/M2 | WEIGHT: 160 LBS | SYSTOLIC BLOOD PRESSURE: 163 MMHG | HEART RATE: 66 BPM | DIASTOLIC BLOOD PRESSURE: 76 MMHG | HEIGHT: 70 IN

## 2020-09-17 PROCEDURE — 99213 OFFICE O/P EST LOW 20 MIN: CPT

## 2020-09-17 RX ORDER — SODIUM HYALURONATE INTRA-ARTICULAR SOLN PREF SYR 25 MG/2.5ML 25/2.5 MG/ML
25 SOLUTION PREFILLED SYRINGE INTRAARTICULAR
Qty: 5 | Refills: 0 | Status: ACTIVE | OUTPATIENT
Start: 2020-09-17

## 2020-09-23 NOTE — HISTORY OF PRESENT ILLNESS
[de-identified] : The patient comes in today with increasing complaints of pain to his right knee.  He has had Durolane with a little relief, but he did much better with Supartz.\par \par

## 2020-09-23 NOTE — DISCUSSION/SUMMARY
[de-identified] : At this time, due to osteoarthritis of the right knee, I recommended a repeat course of viscosupplementation (Supartz).\par

## 2020-09-23 NOTE — ADDENDUM
[FreeTextEntry1] : This note was written by Luis Ribeiro on 09/22/2020, acting as a scribe for Isidro Samuel III, MD

## 2020-09-23 NOTE — PHYSICAL EXAM
[de-identified] : Right Knee:  Range of Motion in Degrees	\par 	               Claimant:	 Normal:	\par Flexion Active	 120 	 135-degrees	\par Flexion Passive	 120	 135-degrees	\par Extension Active	 10	 0-5-degrees	\par Extension Passive	 10	 0-5-degrees	\par \par No weakness to flexion/extension. No evidence of instability in the AP plane or varus or valgus stress. Negative Lachman. Negative pivot shift. Negative anterior drawer test. Negative posterior drawer test. Negative Tom. Negative Apley grind. No medial or lateral joint line tenderness. Positive tenderness over the medial and lateral facet of the patella. Positive patellofemoral crepitations. No lateral tilting patella. No patella apprehension. Positive crepitation in the medial and lateral femoral condyle. No proximal or distal swelling, edema or tenderness. No gross motor or sensory deficits. Mild intra-articular swelling. 2+ DP and PT pulses. Moderate varus deformity. No valgus malalignment. Skin is intact. No rashes, scars or lesions. \par  [de-identified] : Gait and Station:  Ambulating with a slightly antalgic to antalgic gait.  Normal Station.  [de-identified] : Appearance:  Well developed, well-nourished male in no acute distress.\par

## 2020-10-05 ENCOUNTER — APPOINTMENT (OUTPATIENT)
Dept: CARDIOLOGY | Facility: CLINIC | Age: 85
End: 2020-10-05
Payer: MEDICARE

## 2020-10-05 VITALS
DIASTOLIC BLOOD PRESSURE: 77 MMHG | WEIGHT: 160 LBS | HEIGHT: 70 IN | HEART RATE: 70 BPM | BODY MASS INDEX: 22.9 KG/M2 | SYSTOLIC BLOOD PRESSURE: 160 MMHG | OXYGEN SATURATION: 97 %

## 2020-10-05 PROCEDURE — 93000 ELECTROCARDIOGRAM COMPLETE: CPT

## 2020-10-05 PROCEDURE — 99214 OFFICE O/P EST MOD 30 MIN: CPT

## 2020-10-26 ENCOUNTER — APPOINTMENT (OUTPATIENT)
Dept: ORTHOPEDIC SURGERY | Facility: CLINIC | Age: 85
End: 2020-10-26

## 2020-11-03 NOTE — HISTORY OF PRESENT ILLNESS
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  currently off metoprolol.  Denies chest pain, dyspnea or exercise intolerance. No change in memory so far.

## 2020-11-03 NOTE — DISCUSSION/SUMMARY
[FreeTextEntry1] : No change in memory loss off beta blocker.  BP somewhat elevated   Would probably resume his meds.

## 2020-11-03 NOTE — PHYSICAL EXAM
[General Appearance - Well Developed] : well developed [Normal Appearance] : normal appearance [Well Groomed] : well groomed [General Appearance - Well Nourished] : well nourished [No Deformities] : no deformities [General Appearance - In No Acute Distress] : no acute distress [FreeTextEntry1] : significant weight loss [Normal Conjunctiva] : the conjunctiva exhibited no abnormalities [Eyelids - No Xanthelasma] : the eyelids demonstrated no xanthelasmas [Normal Oral Mucosa] : normal oral mucosa [No Oral Pallor] : no oral pallor [No Oral Cyanosis] : no oral cyanosis [Normal Jugular Venous A Waves Present] : normal jugular venous A waves present [Normal Jugular Venous V Waves Present] : normal jugular venous V waves present [No Jugular Venous [place] A Waves] : no jugular venous [place]iagua A waves [Respiration, Rhythm And Depth] : normal respiratory rhythm and effort [Exaggerated Use Of Accessory Muscles For Inspiration] : no accessory muscle use [Auscultation Breath Sounds / Voice Sounds] : lungs were clear to auscultation bilaterally [Heart Rate And Rhythm] : heart rate and rhythm were normal [Heart Sounds] : normal S1 and S2 [Systolic grade ___/6] : A grade [unfilled]/6 systolic murmur was heard. [Diastolic Grade ___/4] : A grade [unfilled]/4 diastolic murmur was heard. [Abdomen Soft] : soft [Abdomen Tenderness] : non-tender [Abdomen Mass (___ Cm)] : no abdominal mass palpated [Abnormal Walk] : normal gait [Gait - Sufficient For Exercise Testing] : the gait was sufficient for exercise testing [Nail Clubbing] : no clubbing of the fingernails [Cyanosis, Localized] : no localized cyanosis [Petechial Hemorrhages (___cm)] : no petechial hemorrhages [Skin Color & Pigmentation] : normal skin color and pigmentation [] : no rash [No Venous Stasis] : no venous stasis [Skin Lesions] : no skin lesions [No Skin Ulcers] : no skin ulcer [No Xanthoma] : no  xanthoma was observed [Oriented To Time, Place, And Person] : oriented to person, place, and time [Affect] : the affect was normal [Mood] : the mood was normal [No Anxiety] : not feeling anxious

## 2021-05-11 ENCOUNTER — APPOINTMENT (OUTPATIENT)
Dept: CARDIOLOGY | Facility: CLINIC | Age: 86
End: 2021-05-11
Payer: MEDICARE

## 2021-05-11 ENCOUNTER — LABORATORY RESULT (OUTPATIENT)
Age: 86
End: 2021-05-11

## 2021-05-11 ENCOUNTER — NON-APPOINTMENT (OUTPATIENT)
Age: 86
End: 2021-05-11

## 2021-05-11 VITALS
BODY MASS INDEX: 22.9 KG/M2 | HEART RATE: 67 BPM | WEIGHT: 160 LBS | OXYGEN SATURATION: 96 % | SYSTOLIC BLOOD PRESSURE: 148 MMHG | DIASTOLIC BLOOD PRESSURE: 73 MMHG | HEIGHT: 70 IN

## 2021-05-11 DIAGNOSIS — I25.10 ATHEROSCLEROTIC HEART DISEASE OF NATIVE CORONARY ARTERY W/OUT ANGINA PECTORIS: ICD-10-CM

## 2021-05-11 PROCEDURE — 99214 OFFICE O/P EST MOD 30 MIN: CPT

## 2021-05-11 PROCEDURE — 99072 ADDL SUPL MATRL&STAF TM PHE: CPT

## 2021-05-11 PROCEDURE — 93000 ELECTROCARDIOGRAM COMPLETE: CPT

## 2021-05-11 RX ORDER — FUROSEMIDE 40 MG/1
40 TABLET ORAL
Qty: 90 | Refills: 3 | Status: ACTIVE | COMMUNITY
Start: 2021-05-11 | End: 1900-01-01

## 2021-05-11 NOTE — DISCUSSION/SUMMARY
[FreeTextEntry1] : New onset of peripheral edema without dyspnea on exertion or orthopnea.  Echocardiogram to evaluate LV function.  Labs to evaluate for anemia, renal function.  See in 1 month.

## 2021-05-11 NOTE — HISTORY OF PRESENT ILLNESS
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  Recently, he has developed peripheral edema.  He denies dyspnea, orthopnea or chest discomfort.  Put on furosemide by urgent care.

## 2021-05-11 NOTE — PHYSICAL EXAM
[Well Developed] : well developed [Well Nourished] : well nourished [No Acute Distress] : no acute distress [Normal Conjunctiva] : normal conjunctiva [Normal Venous Pressure] : normal venous pressure [No Carotid Bruit] : no carotid bruit [Normal S1, S2] : normal S1, S2 [No Murmur] : no murmur [No Rub] : no rub [No Gallop] : no gallop [Murmur] : murmur [Clear Lung Fields] : clear lung fields [Good Air Entry] : good air entry [No Respiratory Distress] : no respiratory distress  [Soft] : abdomen soft [Non Tender] : non-tender [No Masses/organomegaly] : no masses/organomegaly [Normal Bowel Sounds] : normal bowel sounds [Normal Gait] : normal gait [No Cyanosis] : no cyanosis [No Clubbing] : no clubbing [No Varicosities] : no varicosities [No Rash] : no rash [No Skin Lesions] : no skin lesions [Moves all extremities] : moves all extremities [No Focal Deficits] : no focal deficits [Normal Speech] : normal speech [Alert and Oriented] : alert and oriented [Normal memory] : normal memory [de-identified] : 2/6 systolic [de-identified] : 2+ ankle edema

## 2021-05-13 ENCOUNTER — APPOINTMENT (OUTPATIENT)
Dept: INFUSION THERAPY | Facility: CLINIC | Age: 86
End: 2021-05-13
Payer: MEDICARE

## 2021-05-13 ENCOUNTER — RESULT REVIEW (OUTPATIENT)
Age: 86
End: 2021-05-13

## 2021-05-13 ENCOUNTER — OUTPATIENT (OUTPATIENT)
Dept: OUTPATIENT SERVICES | Facility: HOSPITAL | Age: 86
LOS: 1 days | Discharge: HOME | End: 2021-05-13

## 2021-05-13 ENCOUNTER — APPOINTMENT (OUTPATIENT)
Dept: HEMATOLOGY ONCOLOGY | Facility: CLINIC | Age: 86
End: 2021-05-13
Payer: MEDICARE

## 2021-05-13 VITALS
WEIGHT: 165 LBS | DIASTOLIC BLOOD PRESSURE: 66 MMHG | SYSTOLIC BLOOD PRESSURE: 150 MMHG | HEART RATE: 57 BPM | BODY MASS INDEX: 23.68 KG/M2 | TEMPERATURE: 97.9 F

## 2021-05-13 DIAGNOSIS — D75.1 SECONDARY POLYCYTHEMIA: ICD-10-CM

## 2021-05-13 DIAGNOSIS — Z63.4 DISAPPEARANCE AND DEATH OF FAMILY MEMBER: ICD-10-CM

## 2021-05-13 DIAGNOSIS — Z86.79 PERSONAL HISTORY OF OTHER DISEASES OF THE CIRCULATORY SYSTEM: ICD-10-CM

## 2021-05-13 LAB
BASOPHILS # BLD AUTO: 0.05 K/UL — SIGNIFICANT CHANGE UP (ref 0–0.2)
BASOPHILS NFR BLD AUTO: 0.5 % — SIGNIFICANT CHANGE UP (ref 0–2)
EOSINOPHIL # BLD AUTO: 0.33 K/UL — SIGNIFICANT CHANGE UP (ref 0–0.5)
EOSINOPHIL NFR BLD AUTO: 3.6 % — SIGNIFICANT CHANGE UP (ref 0–6)
HCT VFR BLD CALC: 41.8 % — SIGNIFICANT CHANGE UP (ref 39–50)
HGB BLD-MCNC: 14.2 G/DL — SIGNIFICANT CHANGE UP (ref 13–17)
IMM GRANULOCYTES NFR BLD AUTO: 0.2 % — SIGNIFICANT CHANGE UP (ref 0–1.5)
LYMPHOCYTES # BLD AUTO: 2.51 K/UL — SIGNIFICANT CHANGE UP (ref 1–3.3)
LYMPHOCYTES # BLD AUTO: 27.5 % — SIGNIFICANT CHANGE UP (ref 13–44)
MCHC RBC-ENTMCNC: 33.3 PG — SIGNIFICANT CHANGE UP (ref 27–34)
MCHC RBC-ENTMCNC: 34 GM/DL — SIGNIFICANT CHANGE UP (ref 32–36)
MCV RBC AUTO: 98.1 FL — SIGNIFICANT CHANGE UP (ref 80–100)
MONOCYTES # BLD AUTO: 0.87 K/UL — SIGNIFICANT CHANGE UP (ref 0–0.9)
MONOCYTES NFR BLD AUTO: 9.5 % — SIGNIFICANT CHANGE UP (ref 2–14)
NEUTROPHILS # BLD AUTO: 5.36 K/UL — SIGNIFICANT CHANGE UP (ref 1.8–7.4)
NEUTROPHILS NFR BLD AUTO: 58.7 % — SIGNIFICANT CHANGE UP (ref 43–77)
NRBC # BLD: 0 /100 WBCS — SIGNIFICANT CHANGE UP (ref 0–0)
PLATELET # BLD AUTO: 223 K/UL — SIGNIFICANT CHANGE UP (ref 150–400)
RBC # BLD: 4.26 M/UL — SIGNIFICANT CHANGE UP (ref 4.2–5.8)
RBC # FLD: 13.4 % — SIGNIFICANT CHANGE UP (ref 10.3–14.5)
WBC # BLD: 9.14 K/UL — SIGNIFICANT CHANGE UP (ref 3.8–10.5)
WBC # FLD AUTO: 9.14 K/UL — SIGNIFICANT CHANGE UP (ref 3.8–10.5)

## 2021-05-13 PROCEDURE — 99202 OFFICE O/P NEW SF 15 MIN: CPT

## 2021-05-13 RX ORDER — HYALURONATE SOD, CROSS-LINKED 30 MG/3 ML
30 SYRINGE (ML) INTRAARTICULAR
Qty: 1 | Refills: 0 | Status: DISCONTINUED | OUTPATIENT
Start: 2020-09-23 | End: 2021-05-13

## 2021-05-13 RX ORDER — SODIUM HYALURONATE INTRA-ARTICULAR SOLN PREF SYR 25 MG/2.5ML 25/2.5 MG/ML
25 SOLUTION PREFILLED SYRINGE INTRAARTICULAR
Qty: 5 | Refills: 0 | Status: DISCONTINUED | OUTPATIENT
Start: 2020-03-02 | End: 2021-05-13

## 2021-05-13 RX ORDER — SODIUM HYALURONATE INTRA-ARTICULAR SOLN PREF SYR 25 MG/2.5ML 25/2.5 MG/ML
25 SOLUTION PREFILLED SYRINGE INTRAARTICULAR
Qty: 5 | Refills: 0 | Status: DISCONTINUED | OUTPATIENT
Start: 2019-04-03 | End: 2021-05-13

## 2021-05-13 RX ORDER — FUROSEMIDE 20 MG/1
20 TABLET ORAL
Qty: 30 | Refills: 0 | Status: DISCONTINUED | COMMUNITY
Start: 2021-05-01 | End: 2021-05-13

## 2021-05-13 RX ORDER — ATORVASTATIN CALCIUM 20 MG/1
20 TABLET, FILM COATED ORAL
Qty: 30 | Refills: 0 | Status: DISCONTINUED | COMMUNITY
Start: 2020-10-05 | End: 2021-05-13

## 2021-05-13 RX ORDER — SODIUM HYALURONATE INTRA-ARTICULAR SOLN PREF SYR 25 MG/2.5ML 25/2.5 MG/ML
25 SOLUTION PREFILLED SYRINGE INTRAARTICULAR
Qty: 5 | Refills: 0 | Status: DISCONTINUED | OUTPATIENT
Start: 2019-09-19 | End: 2021-05-13

## 2021-05-13 RX ORDER — HYALURONATE SOD, CROSS-LINKED 30 MG/3 ML
30 SYRINGE (ML) INTRAARTICULAR
Qty: 1 | Refills: 0 | Status: DISCONTINUED | OUTPATIENT
Start: 2020-03-03 | End: 2021-05-13

## 2021-05-13 SDOH — SOCIAL STABILITY - SOCIAL INSECURITY: DISSAPEARANCE AND DEATH OF FAMILY MEMBER: Z63.4

## 2021-05-13 NOTE — REVIEW OF SYSTEMS
[Patient Intake Form Reviewed] : Patient intake form was reviewed [Fatigue] : fatigue [Lower Ext Edema] : lower extremity edema [Negative] : Allergic/Immunologic

## 2021-05-14 NOTE — RESULTS/DATA
[FreeTextEntry1] : Repeat CBC today: \par WBC: 9.14   Hgb: 14.2  Hct: 41.8  MCV: 98.1  Plts: 223 \par TOTALLY NORMAL!!\par

## 2021-05-14 NOTE — ASSESSMENT
[FreeTextEntry1] : Patient is an 87 y.o. who was sent to our office for presumed erythrocytosis, however on repeat today CBC is normal.  Yesterday's abnormal value must have been a lab error.  \par Results discussed with patient.  That type of lab error is very unusual, but nonetheless we were able to avoid unnecessary therapy.  \par Patient was seen along with Dr. Parkinson who agrees with the above plan. \par \par Dr. Clifford Lacy (PCP)\par Dr. Dinh Vital (Cardiology)

## 2021-05-14 NOTE — HISTORY OF PRESENT ILLNESS
[de-identified] : JAYLAN MARTINEZ is an 87 y.o. with a PMH significant for CAD, CABG 2003, HTN, and HLD, who was referred to our office urgently for severe erythrocytosis. \par \par 5/11/21 - Labs:  WBC 5.07, Hgb 21.2  Hct 62.9,  MCV 97.2,  Plts clumped;   CMP: WNL

## 2021-05-14 NOTE — PHYSICAL EXAM
[Normal] : well developed, well nourished, in no acute distress [de-identified] : Elderly white male, frail appearing

## 2021-05-21 ENCOUNTER — APPOINTMENT (OUTPATIENT)
Dept: INTERNAL MEDICINE | Facility: CLINIC | Age: 86
End: 2021-05-21
Payer: MEDICARE

## 2021-05-21 VITALS
BODY MASS INDEX: 25.71 KG/M2 | DIASTOLIC BLOOD PRESSURE: 70 MMHG | WEIGHT: 160 LBS | SYSTOLIC BLOOD PRESSURE: 146 MMHG | HEIGHT: 66 IN

## 2021-05-21 DIAGNOSIS — Z00.00 ENCOUNTER FOR GENERAL ADULT MEDICAL EXAMINATION W/OUT ABNORMAL FINDINGS: ICD-10-CM

## 2021-05-21 DIAGNOSIS — I25.10 ATHEROSCLEROTIC HEART DISEASE OF NATIVE CORONARY ARTERY W/OUT ANGINA PECTORIS: ICD-10-CM

## 2021-05-21 PROCEDURE — 36415 COLL VENOUS BLD VENIPUNCTURE: CPT

## 2021-05-21 PROCEDURE — 99072 ADDL SUPL MATRL&STAF TM PHE: CPT

## 2021-05-21 PROCEDURE — G0439: CPT

## 2021-05-21 NOTE — PHYSICAL EXAM
[No Acute Distress] : no acute distress [No JVD] : no jugular venous distention [No Lymphadenopathy] : no lymphadenopathy [Clear to Auscultation] : lungs were clear to auscultation bilaterally [Normal Rate] : normal rate  [Regular Rhythm] : with a regular rhythm [Normal S1, S2] : normal S1 and S2 [No Edema] : there was no peripheral edema [Non Tender] : non-tender [No Rash] : no rash [No Focal Deficits] : no focal deficits [Speech Grossly Normal] : speech grossly normal [Alert and Oriented x3] : oriented to person, place, and time [Normal] : affect was normal and insight and judgment were intact [de-identified] : 2/6 SM

## 2021-05-21 NOTE — REVIEW OF SYSTEMS
[Constipation] : constipation [Joint Pain] : joint pain [Joint Stiffness] : joint stiffness [Anxiety] : anxiety [Depression] : depression [Negative] : Heme/Lymph [Fever] : no fever [Chills] : no chills [Chest Pain] : no chest pain [Palpitations] : no palpitations [Shortness Of Breath] : no shortness of breath [Abdominal Pain] : no abdominal pain [Dysuria] : no dysuria [Muscle Weakness] : no muscle weakness [Joint Swelling] : no joint swelling [Headache] : no headache [Dizziness] : no dizziness [Fainting] : no fainting [Suicidal] : not suicidal [Insomnia] : no insomnia

## 2021-05-21 NOTE — HISTORY OF PRESENT ILLNESS
[de-identified] : 88 y/o is in the office to establish care and presents for Medicare wellness exam and fasting labs.\par He has a past medical history of CAD status post CABG in 03, hypertension, hyperlipidemia and recently lower leg edema.  He had follow-up with Dr. Vital his cardiologist and is presently on Lasix 40 mg.  The leg edema has completely resolved.  He denied any chest pain or shortness of breath or orthopnea when his legs were swollen.  He has been generally well without any recent illness.  He is accompanied by his daughter and she states that for the past few years his general mental condition has declined since the death of his wife 3 years ago and the death of his son 3 years prior to that.  They both state that he has decreased interest at times and he states that he does feel depressed.  He also states that at times he feels fine and a seems to sleep generally well.\par He has knee arthritis and has been receiving injections with good results.  He states he walks with a cane just for safety but he is able to walk without it.

## 2021-05-21 NOTE — HEALTH RISK ASSESSMENT
[No] : In the past 12 months have you used drugs other than those required for medical reasons? No [No falls in past year] : Patient reported no falls in the past year [2] : 2) Feeling down, depressed, or hopeless for more than half of the days (2) [] : No [[id number]] : 4

## 2021-05-21 NOTE — PLAN
[FreeTextEntry1] : Depression–we had a a long discussion concerning various symptoms and presentation of depression.  He admits that he feels depressed at times and does understand why.  He is open to the idea of trying medication and will be started on Lexapro 5 mg daily and he will follow-up in 1 month for reevaluation.  He was told to call or follow-up sooner if there is any problem.\par \par Hypertension/CAD–his blood pressure is slightly elevated today but he will remain on Toprol-XL 25 mg daily.  He has follow-up with his cardiologist in about a month.  He will be having echocardiogram done prior to that.\par \par Leg edema–his edema has completely resolved.  He had no other related symptoms at the time.  Follow-up labs sent to check renal function as well as electrolytes.  He is going to lower the Lasix to 20 mg daily and call if there is any increase in edema.\par \par Hyperlipidemia–his recent cholesterol was well controlled and he will remain on Lipitor 20 mg daily.

## 2021-05-22 LAB
ALBUMIN SERPL ELPH-MCNC: 4 G/DL
ALP BLD-CCNC: 80 U/L
ALT SERPL-CCNC: 13 U/L
ANION GAP SERPL CALC-SCNC: 9 MMOL/L
AST SERPL-CCNC: 24 U/L
BASOPHILS # BLD AUTO: 0.04 K/UL
BASOPHILS NFR BLD AUTO: 0.6 %
BILIRUB SERPL-MCNC: 0.8 MG/DL
BUN SERPL-MCNC: 17 MG/DL
CALCIUM SERPL-MCNC: 9.2 MG/DL
CHLORIDE SERPL-SCNC: 103 MMOL/L
CO2 SERPL-SCNC: 29 MMOL/L
CREAT SERPL-MCNC: 0.86 MG/DL
EOSINOPHIL # BLD AUTO: 0.23 K/UL
EOSINOPHIL NFR BLD AUTO: 3.7 %
GLUCOSE SERPL-MCNC: 104 MG/DL
HCT VFR BLD CALC: 44.8 %
HGB BLD-MCNC: 14.4 G/DL
IMM GRANULOCYTES NFR BLD AUTO: 1 %
LYMPHOCYTES # BLD AUTO: 1.63 K/UL
LYMPHOCYTES NFR BLD AUTO: 26.1 %
MAN DIFF?: NORMAL
MCHC RBC-ENTMCNC: 31.6 PG
MCHC RBC-ENTMCNC: 32.1 GM/DL
MCV RBC AUTO: 98.2 FL
MONOCYTES # BLD AUTO: 0.6 K/UL
MONOCYTES NFR BLD AUTO: 9.6 %
NEUTROPHILS # BLD AUTO: 3.68 K/UL
NEUTROPHILS NFR BLD AUTO: 59 %
PLATELET # BLD AUTO: 189 K/UL
POTASSIUM SERPL-SCNC: 4.3 MMOL/L
PROT SERPL-MCNC: 6.6 G/DL
RBC # BLD: 4.56 M/UL
RBC # FLD: 13.4 %
SODIUM SERPL-SCNC: 141 MMOL/L
T3 SERPL-MCNC: 87 NG/DL
T4 SERPL-MCNC: 6.8 UG/DL
TSH SERPL-ACNC: 1.12 UIU/ML
VIT B12 SERPL-MCNC: 770 PG/ML
WBC # FLD AUTO: 6.24 K/UL

## 2021-05-25 ENCOUNTER — NON-APPOINTMENT (OUTPATIENT)
Age: 86
End: 2021-05-25

## 2021-06-17 ENCOUNTER — APPOINTMENT (OUTPATIENT)
Dept: INTERNAL MEDICINE | Facility: CLINIC | Age: 86
End: 2021-06-17
Payer: MEDICARE

## 2021-06-17 VITALS — HEIGHT: 66 IN | BODY MASS INDEX: 25.71 KG/M2 | WEIGHT: 160 LBS

## 2021-06-17 VITALS — DIASTOLIC BLOOD PRESSURE: 80 MMHG | SYSTOLIC BLOOD PRESSURE: 140 MMHG

## 2021-06-17 DIAGNOSIS — E78.5 HYPERLIPIDEMIA, UNSPECIFIED: ICD-10-CM

## 2021-06-17 DIAGNOSIS — I10 ESSENTIAL (PRIMARY) HYPERTENSION: ICD-10-CM

## 2021-06-17 DIAGNOSIS — R60.0 LOCALIZED EDEMA: ICD-10-CM

## 2021-06-17 PROCEDURE — 99072 ADDL SUPL MATRL&STAF TM PHE: CPT

## 2021-06-17 PROCEDURE — 99214 OFFICE O/P EST MOD 30 MIN: CPT

## 2021-06-17 NOTE — ASSESSMENT
[FreeTextEntry1] : Depression–symptomatically he is much improved since starting Lexapro.  It will be increased to 10 mg daily and he will follow-up in 3 months.  He was told if is any issues or problems he should certainly follow-up sooner.\par \par Hypertension-his blood pressure is slightly elevated today.  For now he will continue with Toprol-XL 25 mg daily.  He follows a in 3 months if it still somewhat elevated then we may consider increasing the dosage.\par \par Leg edema–there has been no worsening or recurrence of the edema since decreasing the dose of the Lasix to 20 mg.  He and his daughter both told at this point they could continue to use it on an as-needed basis or possibly just 3 times a week.  If he has any increased swelling they were told they certainly can increase it back to once a day.\par \par Hyperlipidemia–his lab work from his visit on 5/21 was reviewed.  For now he will remain on Lipitor 20 mg daily and we will repeat his lipid studies at his next visit.

## 2021-06-17 NOTE — HISTORY OF PRESENT ILLNESS
[de-identified] : 86 y/o presents for follow up at his last visit 1 month ago he was started on Lexapro 5 mg for symptoms of depression.  He presents for follow-up with his daughter with them both stating that he feels better since starting the medication.  He is asking that possibly increasing the dose as well.  He had no side effects or other problems with the medication.\par Also at his last visit his Lasix was decreased to 20 mg daily and he has had no problem with any recurrent edema.  His daughter says he does not take it on a regular basis at this point and is probably taking it on average 3 times per week.

## 2021-06-17 NOTE — REVIEW OF SYSTEMS
[Fever] : no fever [Chills] : no chills [Chest Pain] : no chest pain [Shortness Of Breath] : no shortness of breath [Abdominal Pain] : no abdominal pain [Nausea] : no nausea [Diarrhea] : no diarrhea [Headache] : no headache [Dizziness] : no dizziness [Depression] : no depression

## 2021-06-22 LAB
ALBUMIN SERPL ELPH-MCNC: 4 G/DL
ALP BLD-CCNC: 85 U/L
ALT SERPL-CCNC: 14 U/L
ANION GAP SERPL CALC-SCNC: 13 MMOL/L
AST SERPL-CCNC: 23 U/L
BASOPHILS # BLD AUTO: 0.03 K/UL
BASOPHILS NFR BLD AUTO: 0.6 %
BILIRUB SERPL-MCNC: 0.4 MG/DL
BUN SERPL-MCNC: 19 MG/DL
CALCIUM SERPL-MCNC: 9.2 MG/DL
CHLORIDE SERPL-SCNC: 102 MMOL/L
CHOLEST SERPL-MCNC: 126 MG/DL
CO2 SERPL-SCNC: 30 MMOL/L
CREAT SERPL-MCNC: 0.95 MG/DL
EOSINOPHIL # BLD AUTO: 0.18 K/UL
EOSINOPHIL NFR BLD AUTO: 3.6 %
GLUCOSE SERPL-MCNC: 97 MG/DL
HCT VFR BLD CALC: 62.9 %
HDLC SERPL-MCNC: 47 MG/DL
HGB BLD-MCNC: 21.2 G/DL
IMM GRANULOCYTES NFR BLD AUTO: 0.2 %
LDLC SERPL CALC-MCNC: 61 MG/DL
LYMPHOCYTES # BLD AUTO: 1.1 K/UL
LYMPHOCYTES NFR BLD AUTO: 21.7 %
MAN DIFF?: NORMAL
MCHC RBC-ENTMCNC: 32.8 PG
MCHC RBC-ENTMCNC: 33.7 GM/DL
MCV RBC AUTO: 97.2 FL
MONOCYTES # BLD AUTO: 0.3 K/UL
MONOCYTES NFR BLD AUTO: 5.9 %
NEUTROPHILS # BLD AUTO: 3.45 K/UL
NEUTROPHILS NFR BLD AUTO: 68 %
NONHDLC SERPL-MCNC: 79 MG/DL
NT-PROBNP SERPL-MCNC: 140 PG/ML
PLATELET # BLD AUTO: NORMAL K/UL
POTASSIUM SERPL-SCNC: 4.4 MMOL/L
PROT SERPL-MCNC: 6.9 G/DL
RBC # BLD: 6.47 M/UL
RBC # FLD: 14.9 %
SODIUM SERPL-SCNC: 144 MMOL/L
TRIGL SERPL-MCNC: 90 MG/DL
WBC # FLD AUTO: 5.07 K/UL

## 2021-07-01 ENCOUNTER — APPOINTMENT (OUTPATIENT)
Dept: CARDIOLOGY | Facility: CLINIC | Age: 86
End: 2021-07-01

## 2021-07-06 ENCOUNTER — APPOINTMENT (OUTPATIENT)
Dept: CARDIOLOGY | Facility: CLINIC | Age: 86
End: 2021-07-06

## 2021-08-21 ENCOUNTER — TRANSCRIPTION ENCOUNTER (OUTPATIENT)
Age: 86
End: 2021-08-21

## 2021-08-23 ENCOUNTER — APPOINTMENT (OUTPATIENT)
Dept: INTERNAL MEDICINE | Facility: CLINIC | Age: 86
End: 2021-08-23
Payer: MEDICARE

## 2021-08-25 ENCOUNTER — APPOINTMENT (OUTPATIENT)
Dept: INTERNAL MEDICINE | Facility: CLINIC | Age: 86
End: 2021-08-25
Payer: MEDICARE

## 2021-08-25 VITALS — DIASTOLIC BLOOD PRESSURE: 86 MMHG | SYSTOLIC BLOOD PRESSURE: 134 MMHG

## 2021-08-25 VITALS — WEIGHT: 160 LBS | BODY MASS INDEX: 25.71 KG/M2 | HEIGHT: 66 IN

## 2021-08-25 DIAGNOSIS — H93.19 TINNITUS, UNSPECIFIED EAR: ICD-10-CM

## 2021-08-25 PROCEDURE — 99213 OFFICE O/P EST LOW 20 MIN: CPT

## 2021-08-25 NOTE — ASSESSMENT
[FreeTextEntry1] : Depression/anxiety–he seems to respond to the Lexapro but then seems to slip back somewhat.  He is going to increase the dose to 20 mg daily and will see how he feels in the next 6-8 weeks.  It was explained to both him and his daughter that there are additional options in terms of medications if needed.\par \par Tinnitus–he was told that his exam was unremarkable and that his canals are clear.  He was told this is most likely due to tinnitus which is most likely in his case age-related.  His hearing does not seem to be greatly affected at the present time.

## 2021-08-25 NOTE — PHYSICAL EXAM
[No Acute Distress] : no acute distress [No Respiratory Distress] : no respiratory distress  [Normal TMs] : both tympanic membranes were normal [Normal Rate] : normal rate  [Regular Rhythm] : with a regular rhythm [No Edema] : there was no peripheral edema [de-identified] : Canals are clear

## 2021-08-25 NOTE — HISTORY OF PRESENT ILLNESS
[de-identified] : 88 y/o presents for follow up of his depression/anxiety.  At his last visit 2 months ago his Lexapro was increased to 10 mg.  He is here with his daughter and she states that when the dose was increased he did have a period time where he was feeling much better but it seems to have faded somewhat.\par Separately he also complains of some intermittent left ear hissing/ringing sensation.  His hearing does not seem to be greatly affected.

## 2021-12-13 ENCOUNTER — NON-APPOINTMENT (OUTPATIENT)
Age: 86
End: 2021-12-13

## 2021-12-13 ENCOUNTER — APPOINTMENT (OUTPATIENT)
Dept: INTERNAL MEDICINE | Facility: CLINIC | Age: 86
End: 2021-12-13
Payer: MEDICARE

## 2021-12-13 VITALS — HEIGHT: 66 IN | BODY MASS INDEX: 25.71 KG/M2 | WEIGHT: 160 LBS

## 2021-12-13 VITALS — SYSTOLIC BLOOD PRESSURE: 134 MMHG | DIASTOLIC BLOOD PRESSURE: 70 MMHG

## 2021-12-13 DIAGNOSIS — F41.9 ANXIETY DISORDER, UNSPECIFIED: ICD-10-CM

## 2021-12-13 DIAGNOSIS — F32.A DEPRESSION, UNSPECIFIED: ICD-10-CM

## 2021-12-13 PROCEDURE — 99213 OFFICE O/P EST LOW 20 MIN: CPT

## 2021-12-13 NOTE — HISTORY OF PRESENT ILLNESS
[de-identified] : And at his last visit in 8/21 his Lexapro had been increased to 20 mg daily.89 y/o presents for follow up and discuss medications.\par He has a history of depression/anxiety he did well for period of time but he and his daughter both state that as time is gone by it seems to be less effective.  He seems more agitated and anxious in the mornings but by the evening is feeling better.

## 2021-12-13 NOTE — PHYSICAL EXAM
[No Acute Distress] : no acute distress [No JVD] : no jugular venous distention [No Respiratory Distress] : no respiratory distress  [Clear to Auscultation] : lungs were clear to auscultation bilaterally [Normal Rate] : normal rate  [Regular Rhythm] : with a regular rhythm

## 2021-12-13 NOTE — REVIEW OF SYSTEMS
[Fever] : no fever [Chills] : no chills [Chest Pain] : no chest pain [Shortness Of Breath] : no shortness of breath [Joint Pain] : joint pain [Joint Stiffness] : joint stiffness [Dizziness] : dizziness [Fainting] : no fainting

## 2021-12-13 NOTE — ASSESSMENT
[FreeTextEntry1] : Anxiety/depression–he claims to be sleeping well.  His appetite has been good and he was bowels regularly.\par The Lexapro was working initially but seems to be less effective presently.\par He is going to start Zoloft 25 mg for 1 week and then increase to 50 mg.  At the same time he is going to decrease the Lexapro to 10 mg for 1 week and then stop it completely.  He will stay on the Zoloft for a month and then call with his progress.

## 2022-02-11 ENCOUNTER — RX RENEWAL (OUTPATIENT)
Age: 87
End: 2022-02-11

## 2022-02-11 RX ORDER — METOPROLOL SUCCINATE 25 MG/1
25 TABLET, EXTENDED RELEASE ORAL DAILY
Qty: 90 | Refills: 3 | Status: ACTIVE | COMMUNITY
Start: 2021-08-11 | End: 1900-01-01

## 2022-07-05 ENCOUNTER — APPOINTMENT (OUTPATIENT)
Dept: CARDIOLOGY | Facility: CLINIC | Age: 87
End: 2022-07-05

## 2022-07-07 RX ORDER — FUROSEMIDE 20 MG/1
20 TABLET ORAL
Qty: 30 | Refills: 1 | Status: ACTIVE | COMMUNITY
Start: 2021-05-21 | End: 1900-01-01

## 2022-07-22 ENCOUNTER — NON-APPOINTMENT (OUTPATIENT)
Age: 87
End: 2022-07-22

## 2022-08-30 ENCOUNTER — APPOINTMENT (OUTPATIENT)
Dept: CARDIOLOGY | Facility: CLINIC | Age: 87
End: 2022-08-30

## 2022-08-31 ENCOUNTER — APPOINTMENT (OUTPATIENT)
Dept: INTERNAL MEDICINE | Facility: CLINIC | Age: 87
End: 2022-08-31

## 2022-08-31 RX ORDER — SERTRALINE HYDROCHLORIDE 50 MG/1
50 TABLET, FILM COATED ORAL
Qty: 30 | Refills: 2 | Status: ACTIVE | COMMUNITY
Start: 2021-12-13 | End: 1900-01-01

## 2022-08-31 RX ORDER — ESCITALOPRAM OXALATE 20 MG/1
20 TABLET ORAL
Qty: 90 | Refills: 1 | Status: DISCONTINUED | COMMUNITY
Start: 2021-05-21 | End: 2022-08-31

## 2023-01-11 ENCOUNTER — APPOINTMENT (OUTPATIENT)
Dept: ORTHOPEDIC SURGERY | Facility: CLINIC | Age: 88
End: 2023-01-11
Payer: MEDICARE

## 2023-01-11 VITALS
DIASTOLIC BLOOD PRESSURE: 80 MMHG | HEART RATE: 60 BPM | BODY MASS INDEX: 24.11 KG/M2 | WEIGHT: 150 LBS | SYSTOLIC BLOOD PRESSURE: 146 MMHG | HEIGHT: 66 IN

## 2023-01-11 PROCEDURE — 73560 X-RAY EXAM OF KNEE 1 OR 2: CPT | Mod: 50

## 2023-01-11 PROCEDURE — 99213 OFFICE O/P EST LOW 20 MIN: CPT

## 2023-01-12 NOTE — PHYSICAL EXAM
[de-identified] : Right knee:\par Knee: Range of Motion in Degrees	\par 	                  Claimant:	Normal:	\par Flexion Active	  120 	                135-degrees	\par Flexion Passive	  120	                135-degrees	\par Extension Active	   0	                0-5-degrees	\par Extension Passive	   0	                0-5-degrees	\par \par No weakness to flexion/extension. No evidence of instability in the AP plane or varus or valgus stress.  Negative  Lachman.  Negative pivot shift.  Negative anterior drawer test.  Negative posterior drawer test.  Negative Tom.  Negative Apley grind.  No medial or lateral joint line tenderness.  Positive tenderness over the medial and lateral facet of the patella.  Positive patellofemoral crepitations.  No lateral tilting patella.  No patella apprehension.  Positive crepitation in the medial and lateral femoral condyle.  No proximal or distal swelling, edema or tenderness.  No gross motor or sensory deficits. Mild intra-articular swelling.  2+ DP and PT pulses. No varus or valgus malalignment.  Skin is intact.  No rashes, scars or lesions.\par \par Left knee:\par Knee: Range of Motion in Degrees	\par 	                  Claimant:	Normal:	\par Flexion Active	  120 	                135-degrees	\par Flexion Passive	  120	                135-degrees	\par Extension Active	   0	                0-5-degrees	\par Extension Passive	   0	                0-5-degrees	\par \par No weakness to flexion/extension. No evidence of instability in the AP plane or varus or valgus stress.  Negative  Lachman.  Negative pivot shift.  Negative anterior drawer test.  Negative posterior drawer test.  Negative Tom.  Negative Apley grind.  No medial or lateral joint line tenderness.  Positive tenderness over the medial and lateral facet of the patella.  Positive patellofemoral crepitations.  No lateral tilting patella.  No patella apprehension.  Positive crepitation in the medial and lateral femoral condyle.  No proximal or distal swelling, edema or tenderness.  No gross motor or sensory deficits. Mild intra-articular swelling.  2+ DP and PT pulses. No varus or valgus malalignment.  Skin is intact.  No rashes, scars or lesions.  [de-identified] : Gait: Slightly antalgic to antalgic.  Station: Normal  [de-identified] : Appearance: Well-developed, well-nourished male  in no acute distress.  [de-identified] : Radiographs taken in the office today, one to two views of the right knee, one to two views of the left knee and one view of bilateral knees, show severe bone on bone medial compartment arthritis on the right and early severe on the left.

## 2023-01-12 NOTE — DISCUSSION/SUMMARY
[de-identified] : At this time I recommend he undergo a repeat course of viscosupplementation for the osteoarthritis, bilateral knees.

## 2023-01-12 NOTE — ADDENDUM
[FreeTextEntry1] : This note was written by Brittany Hernandez on 01/12/2023 acting as scribe for Isidro Samuel III, MD

## 2023-01-12 NOTE — HISTORY OF PRESENT ILLNESS
[de-identified] : Jimmy comes in today for his bilateral knees.  He has not been seen in a while.  He did very well from his viscosupplementation in the past.  He is having increasing complaints of pain to bilateral knees.

## 2023-01-13 RX ORDER — HYALURONATE SOD, CROSS-LINKED 30 MG/3 ML
30 SYRINGE (ML) INTRAARTICULAR
Qty: 2 | Refills: 0 | Status: ACTIVE | OUTPATIENT
Start: 2023-01-13

## 2023-01-18 RX ORDER — HYALURONATE SODIUM, STABILIZED 60 MG/3 ML
60 SYRINGE (ML) INTRAARTICULAR
Qty: 2 | Refills: 0 | Status: ACTIVE | OUTPATIENT
Start: 2023-01-18

## 2023-01-19 ENCOUNTER — APPOINTMENT (OUTPATIENT)
Dept: ORTHOPEDIC SURGERY | Facility: CLINIC | Age: 88
End: 2023-01-19
Payer: MEDICARE

## 2023-01-19 DIAGNOSIS — M17.11 UNILATERAL PRIMARY OSTEOARTHRITIS, RIGHT KNEE: ICD-10-CM

## 2023-01-19 PROCEDURE — 20610 DRAIN/INJ JOINT/BURSA W/O US: CPT | Mod: 50

## 2023-01-23 PROBLEM — M17.11 ARTHRITIS OF KNEE, RIGHT: Status: ACTIVE | Noted: 2019-04-03

## 2023-01-23 NOTE — PHYSICAL EXAM
[de-identified] : Right Knee:\par Knee: Range of Motion in Degrees	\par 	           Claimant:	Normal:	\par Flexion Active	 120 	 135-degrees	\par Flexion Passive	 120	 135-degrees	\par Extension Active	 0	 0-5-degrees	\par Extension Passive	 0	 0-5-degrees	\par \par No weakness to flexion/extension. No evidence of instability in the AP plane or varus or valgus stress. Negative Lachman. Negative pivot shift. Negative anterior drawer test. Negative posterior drawer test. Negative Tom. Negative Apley grind. No medial or lateral joint line tenderness. Positive tenderness over the medial and lateral facet of the patella. Positive patellofemoral crepitations. No lateral tilting patella. No patella apprehension. Positive crepitation in the medial and lateral femoral condyle. No proximal or distal swelling, edema or tenderness. No gross motor or sensory deficits. Mild intra-articular swelling. 2+ DP and PT pulses. No varus or valgus malalignment. Skin is intact. No rashes, scars or lesions.\par \par Left Knee:\par Knee: Range of Motion in Degrees	\par 	             Claimant:	Normal:	\par Flexion Active	 120 	 135-degrees	\par Flexion Passive	 120	 135-degrees	\par Extension Active	 0	 0-5-degrees	\par Extension Passive	 0	 0-5-degrees	\par \par No weakness to flexion/extension. No evidence of instability in the AP plane or varus or valgus stress. Negative Lachman. Negative pivot shift. Negative anterior drawer test. Negative posterior drawer test. Negative Tom. Negative Apley grind. No medial or lateral joint line tenderness. Positive tenderness over the medial and lateral facet of the patella. Positive patellofemoral crepitations. No lateral tilting patella. No patella apprehension. Positive crepitation in the medial and lateral femoral condyle. No proximal or distal swelling, edema or tenderness. No gross motor or sensory deficits. Mild intra-articular swelling. 2+ DP and PT pulses. No varus or valgus malalignment. Skin is intact. No rashes, scars or lesions. \par

## 2023-01-23 NOTE — PROCEDURE
[de-identified] : \par Indications: \par Osteoarthritis of right knee\par Osteoarthritis of left knee\par \par Consent:\par The risks and benefits of the procedure were discussed with the patient in detail.  Upon verbal consent of the patient, we proceeded with the Durolane injections as noted below.  \par \par Description of Procedure:\par After sterile prep, the patient underwent a Durolane injection of 60 mg of Sodium Hyaluronate in a 3.0 mL syringe into the right and left knee.  The patient tolerated the procedures well.  There were no complications.  \par \par :  Easy Vino\par NDC#:  [address]-6\par Lot#:    [id number]\par Expiration Date:  05/31/2025\par \par Plan:\par I have recommended ice and elevation.  The patient will be reassessed in six to eight weeks following the Durolane injection for the right and left knee osteoarthritis.\par

## 2023-01-23 NOTE — ADDENDUM
[FreeTextEntry1] : This note was written by Luis Ribeiro on 01/23/2023, acting as a scribe for Isidro Samuel III, MD

## 2023-01-30 ENCOUNTER — APPOINTMENT (OUTPATIENT)
Dept: ORTHOPEDIC SURGERY | Facility: CLINIC | Age: 88
End: 2023-01-30
Payer: MEDICARE

## 2023-01-30 PROCEDURE — 99441: CPT

## 2023-02-03 ENCOUNTER — APPOINTMENT (OUTPATIENT)
Dept: ORTHOPEDIC SURGERY | Facility: CLINIC | Age: 88
End: 2023-02-03

## 2023-02-08 ENCOUNTER — APPOINTMENT (OUTPATIENT)
Dept: ORTHOPEDIC SURGERY | Facility: CLINIC | Age: 88
End: 2023-02-08
Payer: MEDICARE

## 2023-02-08 VITALS
WEIGHT: 150 LBS | DIASTOLIC BLOOD PRESSURE: 81 MMHG | HEART RATE: 79 BPM | SYSTOLIC BLOOD PRESSURE: 130 MMHG | BODY MASS INDEX: 24.11 KG/M2 | HEIGHT: 66 IN

## 2023-02-08 DIAGNOSIS — M17.0 BILATERAL PRIMARY OSTEOARTHRITIS OF KNEE: ICD-10-CM

## 2023-02-08 PROCEDURE — 20610 DRAIN/INJ JOINT/BURSA W/O US: CPT | Mod: 50

## 2023-02-09 PROBLEM — M17.0 BILATERAL PRIMARY OSTEOARTHRITIS OF KNEE: Status: ACTIVE | Noted: 2023-01-11

## 2023-02-13 NOTE — ADDENDUM
[FreeTextEntry1] : This note was written by Anni Garcia on 02/09/2023 acting as scribe for Isidro Samuel III, MD

## 2023-02-13 NOTE — PHYSICAL EXAM
[de-identified] : Right Knee:\par Knee: Range of Motion in Degrees	\par 	 Claimant:	Normal:	\par Flexion Active	 120 	 135-degrees	\par Flexion Passive	 120	 135-degrees	\par Extension Active	 0	 0-5-degrees	\par Extension Passive	 0	 0-5-degrees	\par \par No weakness to flexion/extension. No evidence of instability in the AP plane or varus or valgus stress. Negative Lachman. Negative pivot shift. Negative anterior drawer test. Negative posterior drawer test. Negative Tom. Negative Apley grind. No medial or lateral joint line tenderness. Positive tenderness over the medial and lateral facet of the patella. Positive patellofemoral crepitations. No lateral tilting patella. No patella apprehension. Positive crepitation in the medial and lateral femoral condyle. No proximal or distal swelling, edema or tenderness. No gross motor or sensory deficits. Mild intra-articular swelling. 2+ DP and PT pulses. No varus or valgus malalignment. Skin is intact. No rashes, scars or lesions.\par \par Left Knee:\par Knee: Range of Motion in Degrees	\par 	 Claimant:	Normal:	\par Flexion Active	 120 	 135-degrees	\par Flexion Passive	 120	 135-degrees	\par Extension Active	 0	 0-5-degrees	\par Extension Passive	 0	 0-5-degrees	\par \par No weakness to flexion/extension. No evidence of instability in the AP plane or varus or valgus stress. Negative Lachman. Negative pivot shift. Negative anterior drawer test. Negative posterior drawer test. Negative Tom. Negative Apley grind. No medial or lateral joint line tenderness. Positive tenderness over the medial and lateral facet of the patella. Positive patellofemoral crepitations. No lateral tilting patella. No patella apprehension. Positive crepitation in the medial and lateral femoral condyle. No proximal or distal swelling, edema or tenderness. No gross motor or sensory deficits. Mild intra-articular swelling. 2+ DP and PT pulses. No varus or valgus malalignment. Skin is intact. No rashes, scars or lesions. \par  [de-identified] : Gait and Station:  Ambulating with a slightly antalgic to antalgic gait.  Station:  Normal.  [de-identified] : Appearance:  Well-developed, well-nourished male in no acute distress.\par

## 2023-02-13 NOTE — HISTORY OF PRESENT ILLNESS
[de-identified] : The patient comes in today stating he had no improvement from his Durolane, although in the past, he had improvement from his Supartz injections.

## 2023-02-13 NOTE — PROCEDURE
[de-identified] : Indication:  \par Osteoarthritis bilateral knees\par \par Consent: \par At this time, I have recommended injections to the right and left knees.  The risks and benefits of the procedures were discussed with the patient in detail.  Upon verbal consent of the patient, we proceeded with the injections as noted below.  \par \par Description of Procedure:  \par After a sterile prep, the patient underwent an injection of approximately 9 mL of 1% Lidocaine (10 mg/mL) without epinephrine and 1 mL of Kenalog (40 mg/mL) into the right and left knee.  The patient tolerated the procedures well.  There were no complications.  \par \par :  [employer]par Drug Name:  Triamcinolone Acetonide Injectable Suspension USP\par NCD#:  [id number]\par Lot#:  [id number]\par Expiration Date:  01/2024

## 2023-02-13 NOTE — DISCUSSION/SUMMARY
[de-identified] : At this time, due to osteoarthritis of the bilateral knees, the patient was given a cortisone injection into each knee.  I recommend ice, elevation and reassessment in 3-4 weeks.

## 2023-07-19 ENCOUNTER — APPOINTMENT (OUTPATIENT)
Dept: ORTHOPEDIC SURGERY | Facility: CLINIC | Age: 88
End: 2023-07-19

## 2023-08-07 ENCOUNTER — INPATIENT (INPATIENT)
Facility: HOSPITAL | Age: 88
LOS: 2 days | Discharge: NURSING, REHAB OR LONG-TERM CARE | DRG: 640 | End: 2023-08-10
Attending: HOSPITALIST | Admitting: FAMILY MEDICINE
Payer: MEDICARE

## 2023-08-07 VITALS
WEIGHT: 149.91 LBS | OXYGEN SATURATION: 98 % | TEMPERATURE: 98 F | DIASTOLIC BLOOD PRESSURE: 72 MMHG | HEIGHT: 65 IN | SYSTOLIC BLOOD PRESSURE: 136 MMHG | RESPIRATION RATE: 18 BRPM | HEART RATE: 72 BPM

## 2023-08-07 DIAGNOSIS — R53.1 WEAKNESS: ICD-10-CM

## 2023-08-07 LAB
ALBUMIN SERPL ELPH-MCNC: 3.7 G/DL — SIGNIFICANT CHANGE UP (ref 3.3–5)
ALP SERPL-CCNC: 78 U/L — SIGNIFICANT CHANGE UP (ref 40–120)
ALT FLD-CCNC: 15 U/L — SIGNIFICANT CHANGE UP (ref 12–78)
ANION GAP SERPL CALC-SCNC: 6 MMOL/L — SIGNIFICANT CHANGE UP (ref 5–17)
APPEARANCE UR: CLEAR — SIGNIFICANT CHANGE UP
APTT BLD: 39.9 SEC — HIGH (ref 24.5–35.6)
AST SERPL-CCNC: 19 U/L — SIGNIFICANT CHANGE UP (ref 15–37)
BACTERIA # UR AUTO: NEGATIVE — SIGNIFICANT CHANGE UP
BASOPHILS # BLD AUTO: 0.06 K/UL — SIGNIFICANT CHANGE UP (ref 0–0.2)
BASOPHILS NFR BLD AUTO: 0.8 % — SIGNIFICANT CHANGE UP (ref 0–2)
BILIRUB SERPL-MCNC: 0.7 MG/DL — SIGNIFICANT CHANGE UP (ref 0.2–1.2)
BILIRUB UR-MCNC: NEGATIVE — SIGNIFICANT CHANGE UP
BUN SERPL-MCNC: 18 MG/DL — SIGNIFICANT CHANGE UP (ref 7–23)
CALCIUM SERPL-MCNC: 8.8 MG/DL — SIGNIFICANT CHANGE UP (ref 8.5–10.1)
CHLORIDE SERPL-SCNC: 107 MMOL/L — SIGNIFICANT CHANGE UP (ref 96–108)
CO2 SERPL-SCNC: 28 MMOL/L — SIGNIFICANT CHANGE UP (ref 22–31)
COLOR SPEC: YELLOW — SIGNIFICANT CHANGE UP
CREAT SERPL-MCNC: 0.76 MG/DL — SIGNIFICANT CHANGE UP (ref 0.5–1.3)
DIFF PNL FLD: ABNORMAL
EGFR: 86 ML/MIN/1.73M2 — SIGNIFICANT CHANGE UP
EOSINOPHIL # BLD AUTO: 0.13 K/UL — SIGNIFICANT CHANGE UP (ref 0–0.5)
EOSINOPHIL NFR BLD AUTO: 1.8 % — SIGNIFICANT CHANGE UP (ref 0–6)
EPI CELLS # UR: SIGNIFICANT CHANGE UP
GLUCOSE SERPL-MCNC: 98 MG/DL — SIGNIFICANT CHANGE UP (ref 70–99)
GLUCOSE UR QL: NEGATIVE — SIGNIFICANT CHANGE UP
HCT VFR BLD CALC: 40.9 % — SIGNIFICANT CHANGE UP (ref 39–50)
HGB BLD-MCNC: 13.9 G/DL — SIGNIFICANT CHANGE UP (ref 13–17)
IMM GRANULOCYTES NFR BLD AUTO: 0.1 % — SIGNIFICANT CHANGE UP (ref 0–0.9)
INR BLD: 0.99 RATIO — SIGNIFICANT CHANGE UP (ref 0.85–1.18)
KETONES UR-MCNC: ABNORMAL
LEUKOCYTE ESTERASE UR-ACNC: NEGATIVE — SIGNIFICANT CHANGE UP
LYMPHOCYTES # BLD AUTO: 1.53 K/UL — SIGNIFICANT CHANGE UP (ref 1–3.3)
LYMPHOCYTES # BLD AUTO: 21.5 % — SIGNIFICANT CHANGE UP (ref 13–44)
MAGNESIUM SERPL-MCNC: 2 MG/DL — SIGNIFICANT CHANGE UP (ref 1.6–2.6)
MCHC RBC-ENTMCNC: 32.1 PG — SIGNIFICANT CHANGE UP (ref 27–34)
MCHC RBC-ENTMCNC: 34 GM/DL — SIGNIFICANT CHANGE UP (ref 32–36)
MCV RBC AUTO: 94.5 FL — SIGNIFICANT CHANGE UP (ref 80–100)
MONOCYTES # BLD AUTO: 0.65 K/UL — SIGNIFICANT CHANGE UP (ref 0–0.9)
MONOCYTES NFR BLD AUTO: 9.1 % — SIGNIFICANT CHANGE UP (ref 2–14)
NEUTROPHILS # BLD AUTO: 4.75 K/UL — SIGNIFICANT CHANGE UP (ref 1.8–7.4)
NEUTROPHILS NFR BLD AUTO: 66.7 % — SIGNIFICANT CHANGE UP (ref 43–77)
NITRITE UR-MCNC: NEGATIVE — SIGNIFICANT CHANGE UP
PH UR: 5 — SIGNIFICANT CHANGE UP (ref 5–8)
PLATELET # BLD AUTO: 190 K/UL — SIGNIFICANT CHANGE UP (ref 150–400)
POTASSIUM SERPL-MCNC: 3.8 MMOL/L — SIGNIFICANT CHANGE UP (ref 3.5–5.3)
POTASSIUM SERPL-SCNC: 3.8 MMOL/L — SIGNIFICANT CHANGE UP (ref 3.5–5.3)
PROT SERPL-MCNC: 7.1 GM/DL — SIGNIFICANT CHANGE UP (ref 6–8.3)
PROT UR-MCNC: 15
PROTHROM AB SERPL-ACNC: 11.2 SEC — SIGNIFICANT CHANGE UP (ref 9.5–13)
RBC # BLD: 4.33 M/UL — SIGNIFICANT CHANGE UP (ref 4.2–5.8)
RBC # FLD: 13.2 % — SIGNIFICANT CHANGE UP (ref 10.3–14.5)
RBC CASTS # UR COMP ASSIST: SIGNIFICANT CHANGE UP /HPF (ref 0–4)
SODIUM SERPL-SCNC: 141 MMOL/L — SIGNIFICANT CHANGE UP (ref 135–145)
SP GR SPEC: 1.02 — SIGNIFICANT CHANGE UP (ref 1.01–1.02)
TROPONIN I, HIGH SENSITIVITY RESULT: 17.42 NG/L — SIGNIFICANT CHANGE UP
UROBILINOGEN FLD QL: 1
WBC # BLD: 7.13 K/UL — SIGNIFICANT CHANGE UP (ref 3.8–10.5)
WBC # FLD AUTO: 7.13 K/UL — SIGNIFICANT CHANGE UP (ref 3.8–10.5)
WBC UR QL: NEGATIVE /HPF — SIGNIFICANT CHANGE UP (ref 0–5)

## 2023-08-07 PROCEDURE — 99285 EMERGENCY DEPT VISIT HI MDM: CPT

## 2023-08-07 PROCEDURE — 80048 BASIC METABOLIC PNL TOTAL CA: CPT

## 2023-08-07 PROCEDURE — 93005 ELECTROCARDIOGRAM TRACING: CPT

## 2023-08-07 PROCEDURE — 80061 LIPID PANEL: CPT

## 2023-08-07 PROCEDURE — 84443 ASSAY THYROID STIM HORMONE: CPT

## 2023-08-07 PROCEDURE — 93880 EXTRACRANIAL BILAT STUDY: CPT

## 2023-08-07 PROCEDURE — 86140 C-REACTIVE PROTEIN: CPT

## 2023-08-07 PROCEDURE — 93880 EXTRACRANIAL BILAT STUDY: CPT | Mod: 26

## 2023-08-07 PROCEDURE — G1004: CPT

## 2023-08-07 PROCEDURE — 97116 GAIT TRAINING THERAPY: CPT | Mod: GP

## 2023-08-07 PROCEDURE — 97530 THERAPEUTIC ACTIVITIES: CPT | Mod: GP

## 2023-08-07 PROCEDURE — 85027 COMPLETE CBC AUTOMATED: CPT

## 2023-08-07 PROCEDURE — 82746 ASSAY OF FOLIC ACID SERUM: CPT

## 2023-08-07 PROCEDURE — 97110 THERAPEUTIC EXERCISES: CPT | Mod: GP

## 2023-08-07 PROCEDURE — 97162 PT EVAL MOD COMPLEX 30 MIN: CPT | Mod: GP

## 2023-08-07 PROCEDURE — 85652 RBC SED RATE AUTOMATED: CPT

## 2023-08-07 PROCEDURE — 87635 SARS-COV-2 COVID-19 AMP PRB: CPT

## 2023-08-07 PROCEDURE — 99223 1ST HOSP IP/OBS HIGH 75: CPT

## 2023-08-07 PROCEDURE — 82550 ASSAY OF CK (CPK): CPT

## 2023-08-07 PROCEDURE — 71045 X-RAY EXAM CHEST 1 VIEW: CPT | Mod: 26

## 2023-08-07 PROCEDURE — 70551 MRI BRAIN STEM W/O DYE: CPT | Mod: MG

## 2023-08-07 PROCEDURE — 70450 CT HEAD/BRAIN W/O DYE: CPT | Mod: 26,MA

## 2023-08-07 PROCEDURE — 99497 ADVNCD CARE PLAN 30 MIN: CPT | Mod: 25

## 2023-08-07 PROCEDURE — 36415 COLL VENOUS BLD VENIPUNCTURE: CPT

## 2023-08-07 PROCEDURE — 83036 HEMOGLOBIN GLYCOSYLATED A1C: CPT

## 2023-08-07 PROCEDURE — 99283 EMERGENCY DEPT VISIT LOW MDM: CPT

## 2023-08-07 PROCEDURE — 82607 VITAMIN B-12: CPT

## 2023-08-07 RX ORDER — ATORVASTATIN CALCIUM 80 MG/1
20 TABLET, FILM COATED ORAL AT BEDTIME
Refills: 0 | Status: DISCONTINUED | OUTPATIENT
Start: 2023-08-07 | End: 2023-08-08

## 2023-08-07 RX ORDER — SODIUM CHLORIDE 9 MG/ML
500 INJECTION INTRAMUSCULAR; INTRAVENOUS; SUBCUTANEOUS ONCE
Refills: 0 | Status: COMPLETED | OUTPATIENT
Start: 2023-08-07 | End: 2023-08-07

## 2023-08-07 RX ORDER — ASPIRIN/CALCIUM CARB/MAGNESIUM 324 MG
81 TABLET ORAL DAILY
Refills: 0 | Status: DISCONTINUED | OUTPATIENT
Start: 2023-08-07 | End: 2023-08-10

## 2023-08-07 RX ORDER — METOPROLOL TARTRATE 50 MG
25 TABLET ORAL DAILY
Refills: 0 | Status: DISCONTINUED | OUTPATIENT
Start: 2023-08-07 | End: 2023-08-10

## 2023-08-07 RX ORDER — ASPIRIN/CALCIUM CARB/MAGNESIUM 324 MG
325 TABLET ORAL ONCE
Refills: 0 | Status: COMPLETED | OUTPATIENT
Start: 2023-08-07 | End: 2023-08-07

## 2023-08-07 RX ORDER — SERTRALINE 25 MG/1
50 TABLET, FILM COATED ORAL
Refills: 0 | Status: DISCONTINUED | OUTPATIENT
Start: 2023-08-07 | End: 2023-08-10

## 2023-08-07 RX ORDER — ACETAMINOPHEN 500 MG
650 TABLET ORAL EVERY 8 HOURS
Refills: 0 | Status: DISCONTINUED | OUTPATIENT
Start: 2023-08-07 | End: 2023-08-10

## 2023-08-07 RX ADMIN — Medication 650 MILLIGRAM(S): at 22:19

## 2023-08-07 RX ADMIN — SODIUM CHLORIDE 500 MILLILITER(S): 9 INJECTION INTRAMUSCULAR; INTRAVENOUS; SUBCUTANEOUS at 14:58

## 2023-08-07 RX ADMIN — Medication 650 MILLIGRAM(S): at 23:00

## 2023-08-07 RX ADMIN — SERTRALINE 50 MILLIGRAM(S): 25 TABLET, FILM COATED ORAL at 20:38

## 2023-08-07 RX ADMIN — ATORVASTATIN CALCIUM 20 MILLIGRAM(S): 80 TABLET, FILM COATED ORAL at 17:49

## 2023-08-07 RX ADMIN — Medication 325 MILLIGRAM(S): at 17:49

## 2023-08-07 NOTE — H&P ADULT - NSHPPHYSICALEXAM_GEN_ALL_CORE
ICU Vital Signs Last 24 Hrs  T(C): 36.8 (07 Aug 2023 21:27), Max: 36.8 (07 Aug 2023 21:27)  T(F): 98.2 (07 Aug 2023 21:27), Max: 98.2 (07 Aug 2023 21:27)  HR: 70 (07 Aug 2023 21:27) (68 - 93)  BP: 141/63 (07 Aug 2023 21:27) (109/97 - 159/79)  BP(mean): 104 (07 Aug 2023 19:46) (94 - 104)    RR: 18 (07 Aug 2023 21:27) (17 - 18)  SpO2: 99% (07 Aug 2023 21:27) (98% - 100%)    O2 Parameters below as of 07 Aug 2023 21:27  Patient On (Oxygen Delivery Method): room air

## 2023-08-07 NOTE — ED ADULT NURSE REASSESSMENT NOTE - NS ED NURSE REASSESS COMMENT FT1
Received report from previous RN. Patient is lying on stretcher on semi-fowlers position. No signs of distress noted, Vital signs stable. Patient has no complaints at this time. Call bell within reach, bed in lowest position, safety and comfort maintained. Pt awaiting bed status to floor. Pt aware of plan of care. Pt currently eating.

## 2023-08-07 NOTE — H&P ADULT - NSICDXFAMILYHX_GEN_ALL_CORE_FT
FAMILY HISTORY:  Child  Still living? No  Family history of alcoholism, Age at diagnosis: Age Unknown

## 2023-08-07 NOTE — ED ADULT NURSE NOTE - CHIEF COMPLAINT QUOTE
BIBEMS for worsening weakness x1 month. pt found on knees by daughter last night around 11 PM, unknown if fall but daughter reports suspicion is low. as per daughter, pt has been increasingly forgetful over the past week and has been experiencing "shakes"- concerned about early dementia. pt endorsing mild dizziness, BE FAST - in triage.

## 2023-08-07 NOTE — ED PROVIDER NOTE - CLINICAL SUMMARY MEDICAL DECISION MAKING FREE TEXT BOX
88 y/o male with generalized weakness. Nonfocal neuro exam. Plan: CT head, basic labs, EKG, chest XR, urine, reeval. 89 y/o male with generalized weakness. Nonfocal neuro exam. Plan: CT head, basic labs, EKG, chest XR, urine, reeval.    Dariana DO: 2:30pm: s/o to Dr. Murphy pending UA and ambulation trial at this time.

## 2023-08-07 NOTE — H&P ADULT - ASSESSMENT
Pt is admitted w/    Weakness, increased dizziness and difficulty standing  Stroke with NIHSS: 0  CT shows Right basal ganglia infarct of   	indeterminate age. Superimposed chronic bilateral basal ganglia lacunar   	infarcts. ( see full report for details)  ED telemetry shows pt has remained in sinus rhythm   Hx of Hypertension    - admit to medicine with remote telemetry    - Neurochecks Q 4 hour  - s/p ASA 325mg in the ED  - cont ASA, and  statin  - MR head w/o contrast  - apprec Neurology consult by Dr. Abrams  - check fasting lipids, glucose, Hg A1C  -  PT eval  - DVT proph: lovenox  - Full Code Pt is admitted w/    Weakness, increased dizziness and difficulty standing  Stroke with NIHSS: 0  CT shows Right basal ganglia infarct of   	indeterminate age. Superimposed chronic bilateral basal ganglia lacunar   	infarcts. ( see full report for details)  ED telemetry shows pt has remained in sinus rhythm   Hx of Hypertension  Hx of Depression    - admit to medicine with remote telemetry    - Neurochecks Q 4 hour  - s/p ASA 325mg in the ED  - cont ASA 81mg , and  statin  - MR head w/o contrast  - apprec Neurology consult by Dr. Abrams  - check fasting lipids, glucose, Hg A1C  -  PT eval  - cont metoprolol and sertraline, home meds  - DVT proph: lovenox  - Full Code Pt is admitted w/    Weakness, increased dizziness and difficulty standing  Stroke with NIHSS: 0  CT shows Right basal ganglia infarct of   	indeterminate age. Superimposed chronic bilateral basal ganglia lacunar   	infarcts. ( see full report for details)  ED telemetry shows pt has remained in sinus rhythm   Hx of Hypertension  Hx of Depression    - admit to medicine with remote telemetry    - Neurochecks Q 4 hour  - s/p 500 ml NS in the ED  - s/p ASA 325mg in the ED  - cont ASA 81mg , and  statin  - MR head w/o contrast  - apprec Neurology consult by Dr. Abrams  - check fasting lipids, glucose, Hg A1C  -  PT eval  - cont metoprolol and sertraline, home meds  - DVT proph: lovenox  - Full Code

## 2023-08-07 NOTE — PATIENT PROFILE ADULT - FUNCTIONAL ASSESSMENT - BASIC MOBILITY 6.
2-calculated by average/Not able to assess (calculate score using [facility] averaging method)

## 2023-08-07 NOTE — PATIENT PROFILE ADULT - NSPRONUTRITIONRISK_GEN_A_NUR
No indicators present
Alert-The patient is alert, awake and responds to voice. The patient is oriented to time, place, and person. The triage nurse is able to obtain subjective information.

## 2023-08-07 NOTE — PHARMACOTHERAPY INTERVENTION NOTE - COMMENTS
Medication reconciliation completed.  Patient was unable to provide medication information, spoke to daughter Kathryn at bedside and they provided current medication list; confirmed with Dr. [facility].

## 2023-08-07 NOTE — H&P ADULT - NSHPSOCIALHISTORY_GEN_ALL_CORE
Pt is  .  He lives with his daughter.  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  He uses a quad cane to walk.  He denies tob/ETOH/drug use.

## 2023-08-07 NOTE — ED PROVIDER NOTE - OBJECTIVE STATEMENT
89 y/o male with a PMHx of depression, HTN, HLD presents to the ED c/o generalized weakness. Per daughter, she found pt on his knees last night. Daughter reports pt has been dizzy, more weak and can not get out of bed. Denies fevers. No other complaints at this time. 89 y/o male with a PMHx of depression, HTN, HLD presents to the ED c/o generalized weakness. Per daughter, she found pt on his knees last night. Daughter reports pt has been dizzy, more weak and can not get out of bed. no difficulty with speech; no trauma or injury; no unilateral weakness or facial droop noticed; Denies fevers. No other complaints at this time.

## 2023-08-07 NOTE — PATIENT PROFILE ADULT - FALL HARM RISK - HARM RISK INTERVENTIONS
Assistance with ambulation/Assistance OOB with selected safe patient handling equipment/Communicate Risk of Fall with Harm to all staff/Discuss with provider need for PT consult/Monitor gait and stability/Provide patient with walking aids - walker, cane, crutches/Reinforce activity limits and safety measures with patient and family/Tailored Fall Risk Interventions/Use of alarms - bed, chair and/or voice tab/Visual Cue: Yellow wristband and red socks/Bed in lowest position, wheels locked, appropriate side rails in place/Call bell, personal items and telephone in reach/Instruct patient to call for assistance before getting out of bed or chair/Non-slip footwear when patient is out of bed/[place] to call system/Physically safe environment - no spills, clutter or unnecessary equipment/Purposeful Proactive Rounding/Room/bathroom lighting operational, light cord in reach

## 2023-08-07 NOTE — PATIENT PROFILE ADULT - FUNCTIONAL ASSESSMENT - BASIC MOBILITY 3.
Patient is currently in inpatient rehab at [facility], will follow in their system and patient will call for a follow up appointment when he is discharged. 2 = A lot of assistance

## 2023-08-07 NOTE — H&P ADULT - CONVERSATION DETAILS
Pt is Full Code.   He would like a trial of resuscitation.   Pt would like antibiotics,  IVF.    He is interested in a trial of feeding tube and dialysis if needed.       Pt states his HCP is his daughter, Kathryn Tafoya.

## 2023-08-07 NOTE — CONSULT NOTE ADULT - ASSESSMENT
89 year old man c/o diff walking, dizziness, weakness. Exam non focal. NIHSS 0. CT head right BG infarct.  ? new subacute CVA.  Suggest:  monitored bed  f/u b/p  HGA1C, lipid profile  asa, statin  MR head wo  carotid dopplers  PT eval

## 2023-08-07 NOTE — CONSULT NOTE ADULT - SUBJECTIVE AND OBJECTIVE BOX
Neurology Consult requested by:   Patient is a 89y old  Male who presents with a chief complaint of    HPI:      PAST MEDICAL & SURGICAL HISTORY:  Depression      HTN (hypertension)      HLD (hyperlipidemia)        FAMILY HISTORY:    Social Hx:  Nonsmoker, no drug or alcohol use  Medications and Allergies ReviewedMEDICATIONS  (STANDING):  aspirin 325 milliGRAM(s) Oral Once     ROS: Pertinent positives in HPI, all other ROS were reviewed and are negative.      Examination:   Vital Signs Last 24 Hrs  T(C): 36 (07 Aug 2023 16:20), Max: 36.5 (07 Aug 2023 11:14)  T(F): 96.8 (07 Aug 2023 16:20), Max: 97.7 (07 Aug 2023 11:14)  HR: 68 (07 Aug 2023 16:20) (68 - 72)  BP: 143/70 (07 Aug 2023 16:20) (136/72 - 143/70)  BP(mean): 94 (07 Aug 2023 16:20) (94 - 94)  RR: 17 (07 Aug 2023 16:20) (17 - 18)  SpO2: 100% (07 Aug 2023 16:20) (98% - 100%)    Parameters below as of 07 Aug 2023 16:20  Patient On (Oxygen Delivery Method): room air      General: Cooperative, NAD   NECK: supple, no masses  ENT: Normal hearing   Vascular : no carotid bruits,   Lungs: CTAB  Chest: RRR, no murmurs  Extremities: nontender, no edema  Musculoskeletal: no adventitious movements, no joint stiffness  Skin: no rash    Neurological Examination:  NIHSS:  MS: AOx3. Appropriately interactive, normal affect. Speech fluent w/o paraphasic error, repetition, naming, reading is intact   CN: No Papilledema, PERLL, EOMI, V1-3 sensation intact, face symmetric, hearing intact, palate elevates symmetrically, tongue midline, SCM equal bilaterally  Motor: normal bulk and tone, no tremor, rigidity or bradykinesia.  5/5 all over   Sens: Intact to light touch.    Reflexes: 2/4 all over, downgoing toes b/l  Coord:  No dysmetria, JOSE CARLOS intact   Gait: Cannot test    Labs: Reviewed  Comprehensive Metabolic Panel (08.07.23 @ 12:19)   Sodium: 141 mmol/L  Potassium: 3.8 mmol/L  Chloride: 107 mmol/L  Carbon Dioxide: 28 mmol/L  Anion Gap: 6 mmol/L  Blood Urea Nitrogen: 18 mg/dL  Creatinine: 0.76 mg/dL  Glucose: 98 mg/dL  Calcium: 8.8 mg/dL  Protein Total: 7.1 gm/dL  Albumin: 3.7 g/dL  Bilirubin Total: 0.7 mg/dL  Alkaline Phosphatase: 78 U/L  Aspartate Aminotransferase (AST/SGOT): 19 U/L  Alanine Aminotransferase (ALT/SGPT): 15 U/L  eGFR: 86:       Complete Blood Count + Automated Diff (08.07.23 @ 12:19)   WBC Count: 7.13 K/uL  RBC Count: 4.33 M/uL  Hemoglobin: 13.9 g/dL  Hematocrit: 40.9 %  Mean Cell Volume: 94.5 fl  Mean Cell Hemoglobin: 32.1 pg  Mean Cell Hemoglobin Conc: 34.0: Specimen warmed prior to assay. gm/dL  Red Cell Distrib Width: 13.2 %  Platelet Count - Automated: 190 K/uL    < from: CT Head No Cont (08.07.23 @ 13:55) >    FINDINGS:    No acute hemorrhage, hydrocephalus, midline shift or extra-axial   collections are identified. Age-appropriate involutional and moderate   microvascular ischemic change. 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 Superimposed chronic bilateral basal ganglia lacunar   infarcts.    The orbits are not remarkable in appearance.    The visualized paranasal sinuses and tympanomastoid cavities are free of   acute disease.    IMPRESSION:    No acute hemorrhage. Right basal ganglia infarct of indeterminate age.   Brain MRI follow-up is recommended.    < end of copied text >          Imaging: Reviewed    A/P:    No IV tpa given because…  Total Critical Care Time spent:   Neurology Consult requested by:   Patient is a 89y old  Male who presents with a chief complaint of    HPI:  88 y/o man with a PMHx of depression, HTN, HLD presents to the ED c/o generalized weakness. Per daughter,at bedside, last night she found pt on his knees last night. Couldn't get him up. Daughter reports pt has hx of vertigo, he c/o unsteady gait, no recent change. NO headache, change in vision or speech, no diff swallowing, no unilateral weakness or numbness of the extremities.  Denies fevers. No CP, SOB, palpitations.    PAST MEDICAL & SURGICAL HISTORY:  Depression      HTN (hypertension)      HLD (hyperlipidemia)        FAMILY HISTORY:    Social Hx:  Nonsmoker, no drug or alcohol use  Medications and Allergies ReviewedMEDICATIONS  (STANDING):  aspirin 325 milliGRAM(s) Oral Once     ROS: Pertinent positives in HPI, all other ROS were reviewed and are negative.      Examination:   Vital Signs Last 24 Hrs  T(C): 36 (07 Aug 2023 16:20), Max: 36.5 (07 Aug 2023 11:14)  T(F): 96.8 (07 Aug 2023 16:20), Max: 97.7 (07 Aug 2023 11:14)  HR: 68 (07 Aug 2023 16:20) (68 - 72)  BP: 143/70 (07 Aug 2023 16:20) (136/72 - 143/70)  BP(mean): 94 (07 Aug 2023 16:20) (94 - 94)  RR: 17 (07 Aug 2023 16:20) (17 - 18)  SpO2: 100% (07 Aug 2023 16:20) (98% - 100%)    Parameters below as of 07 Aug 2023 16:20  Patient On (Oxygen Delivery Method): room air      General: Cooperative, NAD   NECK: supple, no masses  ENT: Normal hearing   Vascular : no carotid bruits,   Lungs: CTAB  Chest: RRR, no murmurs  Extremities: nontender, no edema  Musculoskeletal: no adventitious movements, no joint stiffness  Skin: no rash    Neurological Examination:  NIHSS:0  MS: AOx3. Appropriately interactive, normal affect. Speech fluent w/o paraphasic error, repetition, naming, intact   CN: VFFTC, PERLL, EOMI, V1-3 sensation intact, face symmetric, hearing intact, palate elevates symmetrically, tongue midline, SCM equal bilaterally  Motor: normal bulk and tone, + kinetic UEs tremor, no rigidity or bradykinesia.  NO drift florencio extremimties, 5/5 all over   Sens: Intact to light touch.    Reflexes: 0-1/4 all over, downgoing toes b/l  Coord:  No dysmetria, JOSE CARLOS intact   Gait: Cannot test    Labs: Reviewed  Comprehensive Metabolic Panel (08.07.23 @ 12:19)   Sodium: 141 mmol/L  Potassium: 3.8 mmol/L  Chloride: 107 mmol/L  Carbon Dioxide: 28 mmol/L  Anion Gap: 6 mmol/L  Blood Urea Nitrogen: 18 mg/dL  Creatinine: 0.76 mg/dL  Glucose: 98 mg/dL  Calcium: 8.8 mg/dL  Protein Total: 7.1 gm/dL  Albumin: 3.7 g/dL  Bilirubin Total: 0.7 mg/dL  Alkaline Phosphatase: 78 U/L  Aspartate Aminotransferase (AST/SGOT): 19 U/L  Alanine Aminotransferase (ALT/SGPT): 15 U/L  eGFR: 86:       Complete Blood Count + Automated Diff (08.07.23 @ 12:19)   WBC Count: 7.13 K/uL  RBC Count: 4.33 M/uL  Hemoglobin: 13.9 g/dL  Hematocrit: 40.9 %  Mean Cell Volume: 94.5 fl  Mean Cell Hemoglobin: 32.1 pg  Mean Cell Hemoglobin Conc: 34.0: Specimen warmed prior to assay. gm/dL  Red Cell Distrib Width: 13.2 %  Platelet Count - Automated: 190 K/uL    < from: CT Head No Cont (08.07.23 @ 13:55) >    FINDINGS:    No acute hemorrhage, hydrocephalus, midline shift or extra-axial   collections are identified. Age-appropriate involutional and moderate   microvascular ischemic change. 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 Superimposed chronic bilateral basal ganglia lacunar   infarcts.    The orbits are not remarkable in appearance.    The visualized paranasal sinuses and tympanomastoid cavities are free of   acute disease.    IMPRESSION:    No acute hemorrhage. Right basal ganglia infarct of indeterminate age.   Brain MRI follow-up is recommended.    < end of copied text >

## 2023-08-07 NOTE — H&P ADULT - HISTORY OF PRESENT ILLNESS
Pt is a pleasant 89 y/o male with a PMHx of depression, HTN, HLD who presented to [place] ED c/o generalized weakness and difficulty standing.   Pt's daughter reported she found pt on his knees last night.  She mentioned pt has been more dizzy and weak and can not get out of bed.    Pt denies headache, no chest pain/palpitations, no fevers. Pt is a pleasant 89 y/o male with a PMHx of depression, HTN, HLD who presented to [place] ED c/o generalized weakness and difficulty standing.   Pt's daughter reported she found pt on his knees last night.  She mentioned pt has been more dizzy and weak and can not get out of bed.    Pt is AAOx3,  but cannot tell me clearly why he came to the ED today.  Pt denies headache, no chest pain/palpitations, no fevers, swallowing difficulty,   no weakness of the arms or legs, no fever, no abdominal pain,  no nausea/ vomiting, no urinary complaints.

## 2023-08-07 NOTE — ED ADULT NURSE NOTE - OBJECTIVE STATEMENT
89 y/o male presents to ED c/o weakness. A&Ox3. Pt's daughter reports that pt was found on his knees last night and was too weak to go to bathroom. Pt denies chest pain, SOB, N/V/D, urinary symptoms, fevers/chills. Pt lives at home with daughter.

## 2023-08-07 NOTE — ED ADULT TRIAGE NOTE - CHIEF COMPLAINT QUOTE
BIBEMS for worsening weakness x1 month. pt found on knees by daughter last night around 11 PM, unknown if fall but pt reports suspicion is low. pt also has generalized pain. as per daughter, pt has been increasingly forgetful over the past week and has been experiencing "shakes". pt endorsing mild dizziness, BE FAST - in triage. BIBEMS for worsening weakness x1 month. pt found on knees by daughter last night around 11 PM, unknown if fall but daughter reports suspicion is low. as per daughter, pt has been increasingly forgetful over the past week and has been experiencing "shakes"- concerned about early dementia. pt endorsing mild dizziness, BE FAST - in triage.

## 2023-08-08 LAB
A1C WITH ESTIMATED AVERAGE GLUCOSE RESULT: 5.7 % — HIGH (ref 4–5.6)
ANION GAP SERPL CALC-SCNC: 4 MMOL/L — LOW (ref 5–17)
BUN SERPL-MCNC: 16 MG/DL — SIGNIFICANT CHANGE UP (ref 7–23)
CALCIUM SERPL-MCNC: 8.3 MG/DL — LOW (ref 8.5–10.1)
CHLORIDE SERPL-SCNC: 109 MMOL/L — HIGH (ref 96–108)
CHOLEST SERPL-MCNC: 140 MG/DL — SIGNIFICANT CHANGE UP
CO2 SERPL-SCNC: 28 MMOL/L — SIGNIFICANT CHANGE UP (ref 22–31)
CREAT SERPL-MCNC: 0.59 MG/DL — SIGNIFICANT CHANGE UP (ref 0.5–1.3)
CRP SERPL-MCNC: 11 MG/L — HIGH
CULTURE RESULTS: SIGNIFICANT CHANGE UP
EGFR: 93 ML/MIN/1.73M2 — SIGNIFICANT CHANGE UP
ERYTHROCYTE [SEDIMENTATION RATE] IN BLOOD: 16 MM/HR — SIGNIFICANT CHANGE UP (ref 0–20)
ESTIMATED AVERAGE GLUCOSE: 117 MG/DL — HIGH (ref 68–114)
FOLATE SERPL-MCNC: 13.4 NG/ML — SIGNIFICANT CHANGE UP
GLUCOSE SERPL-MCNC: 89 MG/DL — SIGNIFICANT CHANGE UP (ref 70–99)
HDLC SERPL-MCNC: 40 MG/DL — LOW
LIPID PNL WITH DIRECT LDL SERPL: 84 MG/DL — SIGNIFICANT CHANGE UP
NON HDL CHOLESTEROL: 100 MG/DL — SIGNIFICANT CHANGE UP
POTASSIUM SERPL-MCNC: 3.5 MMOL/L — SIGNIFICANT CHANGE UP (ref 3.5–5.3)
POTASSIUM SERPL-SCNC: 3.5 MMOL/L — SIGNIFICANT CHANGE UP (ref 3.5–5.3)
SODIUM SERPL-SCNC: 141 MMOL/L — SIGNIFICANT CHANGE UP (ref 135–145)
SPECIMEN SOURCE: SIGNIFICANT CHANGE UP
TRIGL SERPL-MCNC: 82 MG/DL — SIGNIFICANT CHANGE UP
VIT B12 SERPL-MCNC: 417 PG/ML — SIGNIFICANT CHANGE UP (ref 232–1245)

## 2023-08-08 PROCEDURE — 99233 SBSQ HOSP IP/OBS HIGH 50: CPT

## 2023-08-08 PROCEDURE — 70551 MRI BRAIN STEM W/O DYE: CPT | Mod: 26

## 2023-08-08 PROCEDURE — 99232 SBSQ HOSP IP/OBS MODERATE 35: CPT

## 2023-08-08 PROCEDURE — 93010 ELECTROCARDIOGRAM REPORT: CPT

## 2023-08-08 RX ORDER — ENOXAPARIN SODIUM 100 MG/ML
40 INJECTION SUBCUTANEOUS EVERY 24 HOURS
Refills: 0 | Status: DISCONTINUED | OUTPATIENT
Start: 2023-08-08 | End: 2023-08-10

## 2023-08-08 RX ORDER — LIDOCAINE 4 G/100G
2 CREAM TOPICAL DAILY
Refills: 0 | Status: DISCONTINUED | OUTPATIENT
Start: 2023-08-08 | End: 2023-08-10

## 2023-08-08 RX ORDER — TRAMADOL HYDROCHLORIDE 50 MG/1
25 TABLET ORAL EVERY 6 HOURS
Refills: 0 | Status: DISCONTINUED | OUTPATIENT
Start: 2023-08-08 | End: 2023-08-10

## 2023-08-08 RX ORDER — DIAZEPAM 5 MG
2 TABLET ORAL ONCE
Refills: 0 | Status: DISCONTINUED | OUTPATIENT
Start: 2023-08-08 | End: 2023-08-10

## 2023-08-08 RX ORDER — ATORVASTATIN CALCIUM 80 MG/1
80 TABLET, FILM COATED ORAL AT BEDTIME
Refills: 0 | Status: DISCONTINUED | OUTPATIENT
Start: 2023-08-08 | End: 2023-08-10

## 2023-08-08 RX ORDER — LIDOCAINE 4 G/100G
1 CREAM TOPICAL DAILY
Refills: 0 | Status: DISCONTINUED | OUTPATIENT
Start: 2023-08-08 | End: 2023-08-08

## 2023-08-08 RX ADMIN — Medication 650 MILLIGRAM(S): at 13:37

## 2023-08-08 RX ADMIN — ENOXAPARIN SODIUM 40 MILLIGRAM(S): 100 INJECTION SUBCUTANEOUS at 10:57

## 2023-08-08 RX ADMIN — Medication 81 MILLIGRAM(S): at 10:58

## 2023-08-08 RX ADMIN — SERTRALINE 50 MILLIGRAM(S): 25 TABLET, FILM COATED ORAL at 23:09

## 2023-08-08 RX ADMIN — Medication 650 MILLIGRAM(S): at 23:08

## 2023-08-08 RX ADMIN — Medication 25 MILLIGRAM(S): at 10:57

## 2023-08-08 RX ADMIN — SERTRALINE 50 MILLIGRAM(S): 25 TABLET, FILM COATED ORAL at 10:57

## 2023-08-08 RX ADMIN — Medication 650 MILLIGRAM(S): at 10:56

## 2023-08-08 RX ADMIN — ATORVASTATIN CALCIUM 80 MILLIGRAM(S): 80 TABLET, FILM COATED ORAL at 23:08

## 2023-08-08 NOTE — PROGRESS NOTE ADULT - ASSESSMENT
Assessment:  · Assessment	  Pt is admitted w/    Weakness, increased dizziness and difficulty standing  Stroke with NIHSS: 0  CT shows Right basal ganglia infarct of   	indeterminate age. Superimposed chronic bilateral basal ganglia lacunar   	infarcts. ( see full report for details)  ED telemetry shows pt has remained in sinus rhythm   Hx of Hypertension  Hx of Depression    - admit to medicine with remote telemetry    - Neurochecks Q 4 hour  - s/p 500 ml NS in the ED  - s/p ASA 325mg in the ED  - cont ASA 81mg , and  statin  - MR head w/o contrast; results noted above  - apprec Neurology consult by Dr. Abrams  - check fasting lipids, glucose, Hg A1C  -  PT eval aprreciated  - cont metoprolol and sertraline, home meds  - DVT proph: lovenox  - Full Code     Assessment:    # Weakness, increased dizziness and difficulty standing  f/u telemetry to check for any arythmias  MR head w/o contrast; results noted above  apprec Neurology consult by Dr. Abrams  ordered blood work - ESR, CRP, B12, Folate  ordered orthostatic hypotension measurement  medication: continue Tylenol for pain, ultram 25 mg every 6 hrs PRN for moderate pain  Hx of Hypertension  Hx of Depression    #elsy knee arthritis  lidocaine patche    - Neurochecks Q 4 hour  - s/p 500 ml NS in the ED  - s/p ASA 325mg in the ED  - cont ASA 81mg , and  statin  - check fasting lipids, glucose, Hg A1C  -  PT eval aprreciated  - cont metoprolol and sertraline, home meds  - DVT proph: lovenox  - Full Code

## 2023-08-08 NOTE — PROGRESS NOTE ADULT - SUBJECTIVE AND OBJECTIVE BOX
HPI:  89 y/o male with a PMHx of depression, HTN, HLD who presented to [place] ED c/o generalized weakness and difficulty standing.   No complaints.    ROS:     MEDICATIONS  (STANDING):  acetaminophen     Tablet .. 650 milliGRAM(s) Oral every 8 hours  aspirin enteric coated 81 milliGRAM(s) Oral daily  atorvastatin 20 milliGRAM(s) Oral at bedtime  diazepam    Tablet 2 milliGRAM(s) Oral once  enoxaparin Injectable 40 milliGRAM(s) SubCutaneous every 24 hours  metoprolol succinate ER 25 milliGRAM(s) Oral daily  sertraline 50 milliGRAM(s) Oral two times a day    MEDICATIONS  (PRN):      Vital Signs Last 24 Hrs  T(C): 36.8 (08 Aug 2023 08:09), Max: 36.8 (07 Aug 2023 21:27)  T(F): 98.2 (08 Aug 2023 08:09), Max: 98.2 (07 Aug 2023 21:27)  HR: 66 (08 Aug 2023 08:09) (66 - 93)  BP: 157/67 (08 Aug 2023 08:09) (109/97 - 159/79)  BP(mean): 104 (07 Aug 2023 19:46) (94 - 104)  RR: 18 (08 Aug 2023 08:09) (17 - 18)  SpO2: 98% (08 Aug 2023 08:09) (98% - 100%)    Parameters below as of 08 Aug 2023 08:09  Patient On (Oxygen Delivery Method): room air      Neurological Exam:    HF: Patient is alert and oriented x 2. There is no aphasia or dysarthria. Follows  commands.   CN: Pupils are equal and reactive. Extra ocular muscles are intact. There is no facial droop or asymmetry. Tongue is midline. Sensation is intact in the face. Other CN II-XII are intact.   Motor: motor examination all muscles are 5/5.   Sensory: intact to touch.   DTR: 0-1/4 all 4 extremities.  Co-ord:  Finger to finger to nose is intact. Heel to shin is intact bilaterally.       < from: MR Head No Cont (08.08.23 @ 09:14) >  FINDINGS:  There is no abnormal restricted diffusion to suggest acute infarction.   Scattered periventricular and subcortical white matter T2 /FLAIR   hyperintensities are seen without mass effect, nonspecific, likely   representing severe chronic microvascular changes. Normal T2 flow-voids   are seen within  the intracranial vasculature. The lateral ventricles and   cortical sulci are age-appropriate in size and configuration. There is no   mass, mass effect, or extra-axial fluid collection. There is no   susceptibility artifact to suggest hemorrhage. Midline structures are   normal.  The patient is status post bilateral ocular lens replacement   surgery. Mild inflammatory mucosal changes are seen throughout the   various portions of the paranasal sinuses. The orbits and mastoid air   cells are unremarkable.    IMPRESSION: No acute infarction.    < end of copied text >    < from: US Duplex Carotid Arteries Complete, Bilateral (08.07.23 @ 18:58) >  IMPRESSION: Mild atherosclerotic plaque at both carotid bulbs.  No   significant hemodynamic stenosis of either carotid artery.    Measurement of carotid stenosis is based on velocity parameters that   correlate the residual internal carotid diameter with that of the more   distal vessel in accordance with a method such as the [place] [employer]   Symptomatic Carotid Endarterectomy Trial (NASCET).    < end of copied text >

## 2023-08-08 NOTE — PHYSICAL THERAPY INITIAL EVALUATION ADULT - PATIENT PROFILE REVIEW, REHAB EVAL
Chart reviewed and contents noted (activity order- out of bed with assistance). Please refer to plan of care and A&I for ongoing treatment notes./yes

## 2023-08-08 NOTE — PHYSICAL THERAPY INITIAL EVALUATION ADULT - IMPAIRED TRANSFERS: SIT/STAND, REHAB EVAL
+posterior lean; pt assisted in use of urinal upon standing./impaired balance/impaired postural control/decreased strength

## 2023-08-08 NOTE — PHYSICAL THERAPY INITIAL EVALUATION ADULT - RANGE OF MOTION EXAMINATION, REHAB EVAL
except limited b/l shoulder flexion/bilateral upper extremity ROM was WFL (within functional limits)/bilateral lower extremity ROM was WFL (within functional limits)

## 2023-08-08 NOTE — PHYSICAL THERAPY INITIAL EVALUATION ADULT - PERTINENT HX OF CURRENT PROBLEM, REHAB EVAL
Pt is an 89 year old male presenting with weakness and difficulty standing. CT head questionable subacute right BG infarct, MR head shows no acute infarct. Admitted for further management. PMH below.

## 2023-08-08 NOTE — PROGRESS NOTE ADULT - SUBJECTIVE AND OBJECTIVE BOX
History of Present Illness:  Reason for Admission: Weakness, increased dizziness and difficulty standing  Stroke  History of Present Illness:   Pt is a pleasant 89 y/o male with a PMHx of depression, HTN, HLD who presented to [facility] c/o generalized weakness and difficulty standing.   Pt's daughter reported she found pt on his knees last night.  She mentioned pt has been more dizzy and weak and can not get out of bed.    Pt is AAOx3,  but cannot tell me clearly why he came to the ED today.  Pt denies headache, no chest pain/palpitations, no fevers, swallowing difficulty,   no weakness of the arms or legs, no fever, no abdominal pain,  no nausea/ vomiting, no urinary complaints.    PAST MEDICAL & SURGICAL HISTORY:  Depression      HTN (hypertension)      HLD (hyperlipidemia)      No significant past surgical history            REVIEW OF SYSTEMS      General:	    Skin/Breast:  	  Ophthalmologic:  	  ENMT:	    Respiratory and Thorax:  	  Cardiovascular:	    Gastrointestinal:	    Genitourinary:	    Musculoskeletal:	    Neurological:	    Psychiatric:	    Hematology/Lymphatics:	    Endocrine:	    Allergic/Immunologic:	    MEDICATIONS  (STANDING):  acetaminophen     Tablet .. 650 milliGRAM(s) Oral every 8 hours  aspirin enteric coated 81 milliGRAM(s) Oral daily  atorvastatin 20 milliGRAM(s) Oral at bedtime  diazepam    Tablet 2 milliGRAM(s) Oral once  enoxaparin Injectable 40 milliGRAM(s) SubCutaneous every 24 hours  metoprolol succinate ER 25 milliGRAM(s) Oral daily  sertraline 50 milliGRAM(s) Oral two times a day    MEDICATIONS  (PRN):                            13.9   7.13  )-----------( 190      ( 07 Aug 2023 12:19 )             40.9   08-08    141  |  109<H>  |  16  ----------------------------<  89  3.5   |  28  |  0.59    Ca    8.3<L>      08 Aug 2023 07:34  Mg     2.0     08-07    TPro  7.1  /  Alb  3.7  /  TBili  0.7  /  DBili  x   /  AST  19  /  ALT  15  /  AlkPhos  78  08-07        < from: Xray Chest 1 View- PORTABLE-Urgent (Xray Chest 1 View- PORTABLE-Urgent .) (08.07.23 @ 13:23) >  PROCEDURE DATE:  08/07/2023          INTERPRETATION:  AP chest on August 7, 2023 at 1:08 PM. Patient has   weakness.    COMPARISON: None available.    Heart size is within normal limits. Sternotomy is noted.    Lungs are clear.    IMPRESSION: Sternotomy. Clear lungs.    < end of copied text >  < from: CT Head No Cont (08.07.23 @ 13:55) >  PROCEDURE DATE:  08/07/2023          INTERPRETATION:  Noncontrast CT of the brain.    CLINICAL INDICATION:  Weakness    TECHNIQUE : Axial CT scanning of the brain was obtained from theskull   base to the vertex without the administration of intravenous contrast.    COMPARISON: None available    FINDINGS:    No acute hemorrhage, hydrocephalus, midline shift or extra-axial   collections are identified. Age-appropriate involutional and moderate   microvascular ischemic change. 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 Superimposed chronic bilateral basal ganglia lacunar   infarcts.    The orbits are not remarkable in appearance.    The visualized paranasal sinuses and tympanomastoid cavities are free of   acute disease.    IMPRESSION:    No acute hemorrhage. Right basal ganglia infarct of indeterminate age.   Brain MRI follow-up is recommended.    --- End of Report ---      < end of copied text >  < from: US Duplex Carotid Arteries Complete, Bilateral (08.07.23 @ 18:58) >  PROCEDURE DATE:  08/07/2023          INTERPRETATION:  CLINICAL INFORMATION: Cerebral vascular accident.    Dizziness.  Difficulty walking.    COMPARISON: None available.    TECHNIQUE: Grayscale, color and spectral Doppler examination of both   carotid arteries was performed.    FINDINGS:  There is mild atherosclerotic plaque at both carotid bulbs.    No elevated velocities or abnormal waveforms are encountered.    Peak systolic velocities are as follows:    RIGHT:  PROX CCA = 90 cm/s  DIST CCA = 97 cm/s  PROX ICA = 102 cm/s  DIST ICA = 116 cm/s  ECA = 89 cm/s,89 cm/s    LEFT:  PROX CCA = 135 cm/s  DIST CCA = 137 cm/s  PROX ICA = 90 cm/s  DIST ICA = 87 cm/s  ECA = 75 cm/s    Antegrade flow is noted within both vertebral arteries.    IMPRESSION: Mild atherosclerotic plaque at both carotid bulbs.  No   significant hemodynamic stenosis of either carotid artery.    Measurement of carotid stenosis is based on velocity parameters that   correlate the residual internal carotid diameter with that of the more   distal vessel in accordance with a method such as the North American   Symptomatic Carotid Endarterectomy Trial (NASCET).    < end of copied text >  < from: MR Head No Cont (08.08.23 @ 09:14) >  PROCEDURE DATE:  08/08/2023          INTERPRETATION:  CLINICAL INDICATIONS: TIA    COMPARISON: Head CT dated 8/7/2023    TECHNIQUE: MRI brain: Multiplanar, multisequence MR imaging of thebrain   are obtained without the administration of intravenous Gadavist contrast.    FINDINGS:  There is no abnormal restricted diffusion to suggest acute infarction.   Scattered periventricular and subcortical white matter T2 /FLAIR   hyperintensities are seen without mass effect, nonspecific, likely   representing severe chronic microvascular changes. Normal T2 flow-voids   are seen within  the intracranial vasculature. The lateral ventricles and   cortical sulci are age-appropriate in size andconfiguration. There is no   mass, mass effect, or extra-axial fluid collection. There is no   susceptibility artifact to suggest hemorrhage. Midline structures are   normal.  The patient is status post bilateral ocular lens replacement   surgery. Mild inflammatory mucosal changes are seen throughout the   various portions of the paranasal sinuses. The orbits and mastoid air   cells are unremarkable.    IMPRESSION: No acute infarction.    --- End of Report ---      < end of copied text >     History of Present Illness:  Reason for Admission: Weakness, increased dizziness and difficulty standing  Stroke  History of Present Illness:   Pt is a pleasant 89 y/o male with a PMHx of depression, HTN, HLD who presented to [facility] ED c/o generalized weakness and difficulty standing.   Pt's daughter reported she found pt on his knees last night.  She mentioned pt has been more dizzy and weak and can not get out of bed.    Pt is AAOx3,  but cannot tell me clearly why he came to the ED today.  Pt denies headache, no chest pain/palpitations, no fevers, swallowing difficulty,   no weakness of the arms or legs, no fever, no abdominal pain,  no nausea/ vomiting, no urinary complaints.    8/8/23: pt was seen and examined at bedside today. Pt NAD with a family member present.     PAST MEDICAL & SURGICAL HISTORY:  Depression      HTN (hypertension)      HLD (hyperlipidemia)      No significant past surgical history            REVIEW OF SYSTEMS      General:	    Skin/Breast:  	  Ophthalmologic:  	  ENMT:	    Respiratory and Thorax:  	  Cardiovascular:	    Gastrointestinal:	    Genitourinary:	    Musculoskeletal:	    Neurological:	    Psychiatric:	    Hematology/Lymphatics:	    Endocrine:	    Allergic/Immunologic:	    MEDICATIONS  (STANDING):  acetaminophen     Tablet .. 650 milliGRAM(s) Oral every 8 hours  aspirin enteric coated 81 milliGRAM(s) Oral daily  atorvastatin 20 milliGRAM(s) Oral at bedtime  diazepam    Tablet 2 milliGRAM(s) Oral once  enoxaparin Injectable 40 milliGRAM(s) SubCutaneous every 24 hours  metoprolol succinate ER 25 milliGRAM(s) Oral daily  sertraline 50 milliGRAM(s) Oral two times a day    MEDICATIONS  (PRN):                            13.9   7.13  )-----------( 190      ( 07 Aug 2023 12:19 )             40.9   08-08    141  |  109<H>  |  16  ----------------------------<  89  3.5   |  28  |  0.59    Ca    8.3<L>      08 Aug 2023 07:34  Mg     2.0     08-07    TPro  7.1  /  Alb  3.7  /  TBili  0.7  /  DBili  x   /  AST  19  /  ALT  15  /  AlkPhos  78  08-07        < from: Xray Chest 1 View- PORTABLE-Urgent (Xray Chest 1 View- PORTABLE-Urgent .) (08.07.23 @ 13:23) >  PROCEDURE DATE:  08/07/2023          INTERPRETATION:  AP chest on August 7, 2023 at 1:08 PM. Patient has   weakness.    COMPARISON: None available.    Heart size is within normal limits. Sternotomy is noted.    Lungs are clear.    IMPRESSION: Sternotomy. Clear lungs.    < end of copied text >  < from: CT Head No Cont (08.07.23 @ 13:55) >  PROCEDURE DATE:  08/07/2023          INTERPRETATION:  Noncontrast CT of the brain.    CLINICAL INDICATION:  Weakness    TECHNIQUE : Axial CT scanning of the brain was obtained from theskull   base to the vertex without the administration of intravenous contrast.    COMPARISON: None available    FINDINGS:    No acute hemorrhage, hydrocephalus, midline shift or extra-axial   collections are identified. Age-appropriate involutional and moderate   microvascular ischemic change. 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 Superimposed chronic bilateral basal ganglia lacunar   infarcts.    The orbits are not remarkable in appearance.    The visualized paranasal sinuses and tympanomastoid cavities are free of   acute disease.    IMPRESSION:    No acute hemorrhage. Right basal ganglia infarct of indeterminate age.   Brain MRI follow-up is recommended.    --- End of Report ---      < end of copied text >  < from: US Duplex Carotid Arteries Complete, Bilateral (08.07.23 @ 18:58) >  PROCEDURE DATE:  08/07/2023          INTERPRETATION:  CLINICAL INFORMATION: Cerebral vascular accident.    Dizziness.  Difficulty walking.    COMPARISON: None available.    TECHNIQUE: Grayscale, color and spectral Doppler examination of both   carotid arteries was performed.    FINDINGS:  There is mild atherosclerotic plaque at both carotid bulbs.    No elevated velocities or abnormal waveforms are encountered.    Peak systolic velocities are as follows:    RIGHT:  PROX CCA = 90 cm/s  DIST CCA = 97 cm/s  PROX ICA = 102 cm/s  DIST ICA = 116 cm/s  ECA = 89 cm/s,89 cm/s    LEFT:  PROX CCA = 135 cm/s  DIST CCA = 137 cm/s  PROX ICA = 90 cm/s  DIST ICA = 87 cm/s  ECA = 75 cm/s    Antegrade flow is noted within both vertebral arteries.    IMPRESSION: Mild atherosclerotic plaque at both carotid bulbs.  No   significant hemodynamic stenosis of either carotid artery.    Measurement of carotid stenosis is based on velocity parameters that   correlate the residual internal carotid diameter with that of the more   distal vessel in accordance with a method such as the North American   Symptomatic Carotid Endarterectomy Trial (NASCET).    < end of copied text >  < from: MR Head No Cont (08.08.23 @ 09:14) >  PROCEDURE DATE:  08/08/2023          INTERPRETATION:  CLINICAL INDICATIONS: TIA    COMPARISON: Head CT dated 8/7/2023    TECHNIQUE: MRI brain: Multiplanar, multisequence MR imaging of thebrain   are obtained without the administration of intravenous Gadavist contrast.    FINDINGS:  There is no abnormal restricted diffusion to suggest acute infarction.   Scattered periventricular and subcortical white matter T2 /FLAIR   hyperintensities are seen without mass effect, nonspecific, likely   representing severe chronic microvascular changes. Normal T2 flow-voids   are seen within  the intracranial vasculature. The lateral ventricles and   cortical sulci are age-appropriate in size andconfiguration. There is no   mass, mass effect, or extra-axial fluid collection. There is no   susceptibility artifact to suggest hemorrhage. Midline structures are   normal.  The patient is status post bilateral ocular lens replacement   surgery. Mild inflammatory mucosal changes are seen throughout the   various portions of the paranasal sinuses. The orbits and mastoid air   cells are unremarkable.    IMPRESSION: No acute infarction.    --- End of Report ---      < end of copied text >     History of Present Illness:  Reason for Admission: Weakness, increased dizziness and difficulty standing  Stroke  History of Present Illness:   Pt is a pleasant 88 y/o male with a PMHx of depression, HTN, HLD who presented to [facility] ED c/o generalized weakness and difficulty standing.   Pt's daughter reported she found pt on his knees last night.  She mentioned pt has been more dizzy and weak and can not get out of bed.    Pt is AAOx3,  but cannot tell me clearly why he came to the ED today.  Pt denies headache, no chest pain/palpitations, no fevers, swallowing difficulty,   no weakness of the arms or legs, no fever, no abdominal pain,  no nausea/ vomiting, no urinary complaints.    8/8/23: pt was seen and examined at bedside today. Pt NAD with a family member present.     PAST MEDICAL & SURGICAL HISTORY:  Depression      HTN (hypertension)      HLD (hyperlipidemia)      No significant past surgical history            ROS: the patient denies fever, no chills, no HA, no sore throat, no blurry vision, no CP, no palpitations, no SOB, no cough, no abdominal pain, no diarrhea, no N/V, no dysuria, no leg pain, no claudication, no rash, no joint aches, no rectal pain or bleeding, no night sweats  All other systems reviewed and are negative    MEDICATIONS  (STANDING):  acetaminophen     Tablet .. 650 milliGRAM(s) Oral every 8 hours  aspirin enteric coated 81 milliGRAM(s) Oral daily  atorvastatin 20 milliGRAM(s) Oral at bedtime  diazepam    Tablet 2 milliGRAM(s) Oral once  enoxaparin Injectable 40 milliGRAM(s) SubCutaneous every 24 hours  metoprolol succinate ER 25 milliGRAM(s) Oral daily  sertraline 50 milliGRAM(s) Oral two times a day    MEDICATIONS  (PRN):                            13.9   7.13  )-----------( 190      ( 07 Aug 2023 12:19 )             40.9   08-08    141  |  109<H>  |  16  ----------------------------<  89  3.5   |  28  |  0.59    Ca    8.3<L>      08 Aug 2023 07:34  Mg     2.0     08-07    TPro  7.1  /  Alb  3.7  /  TBili  0.7  /  DBili  x   /  AST  19  /  ALT  15  /  AlkPhos  78  08-07        < from: Xray Chest 1 View- PORTABLE-Urgent (Xray Chest 1 View- PORTABLE-Urgent .) (08.07.23 @ 13:23) >  PROCEDURE DATE:  08/07/2023          INTERPRETATION:  AP chest on August 7, 2023 at 1:08 PM. Patient has   weakness.    COMPARISON: None available.    Heart size is within normal limits. Sternotomy is noted.    Lungs are clear.    IMPRESSION: Sternotomy. Clear lungs.    < end of copied text >  < from: CT Head No Cont (08.07.23 @ 13:55) >  PROCEDURE DATE:  08/07/2023          INTERPRETATION:  Noncontrast CT of the brain.    CLINICAL INDICATION:  Weakness    TECHNIQUE : Axial CT scanning of the brain was obtained from theskull   base to the vertex without the administration of intravenous contrast.    COMPARISON: None available    FINDINGS:    No acute hemorrhage, hydrocephalus, midline shift or extra-axial   collections are identified. Age-appropriate involutional and moderate   microvascular ischemic change. 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 Superimposed chronic bilateral basal ganglia lacunar   infarcts.    The orbits are not remarkable in appearance.    The visualized paranasal sinuses and tympanomastoid cavities are free of   acute disease.    IMPRESSION:    No acute hemorrhage. Right basal ganglia infarct of indeterminate age.   Brain MRI follow-up is recommended.    --- End of Report ---      < end of copied text >  < from: US Duplex Carotid Arteries Complete, Bilateral (08.07.23 @ 18:58) >  PROCEDURE DATE:  08/07/2023          INTERPRETATION:  CLINICAL INFORMATION: Cerebral vascular accident.    Dizziness.  Difficulty walking.    COMPARISON: None available.    TECHNIQUE: Grayscale, color and spectral Doppler examination of both   carotid arteries was performed.    FINDINGS:  There is mild atherosclerotic plaque at both carotid bulbs.    No elevated velocities or abnormal waveforms are encountered.    Peak systolic velocities are as follows:    RIGHT:  PROX CCA = 90 cm/s  DIST CCA = 97 cm/s  PROX ICA = 102 cm/s  DIST ICA = 116 cm/s  ECA = 89 cm/s,89 cm/s    LEFT:  PROX CCA = 135 cm/s  DIST CCA = 137 cm/s  PROX ICA = 90 cm/s  DIST ICA = 87 cm/s  ECA = 75 cm/s    Antegrade flow is noted within both vertebral arteries.    IMPRESSION: Mild atherosclerotic plaque at both carotid bulbs.  No   significant hemodynamic stenosis of either carotid artery.    Measurement of carotid stenosis is based on velocity parameters that   correlate the residual internal carotid diameter with that of the more   distal vessel in accordance with a method such as the North American   Symptomatic Carotid Endarterectomy Trial (NASCET).    < end of copied text >  < from: MR Head No Cont (08.08.23 @ 09:14) >  PROCEDURE DATE:  08/08/2023          INTERPRETATION:  CLINICAL INDICATIONS: TIA    COMPARISON: Head CT dated 8/7/2023    TECHNIQUE: MRI brain: Multiplanar, multisequence MR imaging of thebrain   are obtained without the administration of intravenous Gadavist contrast.    FINDINGS:  There is no abnormal restricted diffusion to suggest acute infarction.   Scattered periventricular and subcortical white matter T2 /FLAIR   hyperintensities are seen without mass effect, nonspecific, likely   representing severe chronic microvascular changes. Normal T2 flow-voids   are seen within  the intracranial vasculature. The lateral ventricles and   cortical sulci are age-appropriate in size andconfiguration. There is no   mass, mass effect, or extra-axial fluid collection. There is no   susceptibility artifact to suggest hemorrhage. Midline structures are   normal.  The patient is status post bilateral ocular lens replacement   surgery. Mild inflammatory mucosal changes are seen throughout the   various portions of the paranasal sinuses. The orbits and mastoid air   cells are unremarkable.    IMPRESSION: No acute infarction.    --- End of Report ---      < end of copied text >

## 2023-08-08 NOTE — PHYSICAL THERAPY INITIAL EVALUATION ADULT - MODALITIES TREATMENT COMMENTS
Pt left seated in bedside chair with chair alarm activated, all lines intact and RN aware of eval outcome, VSS. +call bell in reach.

## 2023-08-09 LAB
ANION GAP SERPL CALC-SCNC: 4 MMOL/L — LOW (ref 5–17)
BUN SERPL-MCNC: 22 MG/DL — SIGNIFICANT CHANGE UP (ref 7–23)
CALCIUM SERPL-MCNC: 8.5 MG/DL — SIGNIFICANT CHANGE UP (ref 8.5–10.1)
CHLORIDE SERPL-SCNC: 110 MMOL/L — HIGH (ref 96–108)
CK SERPL-CCNC: 104 U/L — SIGNIFICANT CHANGE UP (ref 26–308)
CO2 SERPL-SCNC: 28 MMOL/L — SIGNIFICANT CHANGE UP (ref 22–31)
CREAT SERPL-MCNC: 0.64 MG/DL — SIGNIFICANT CHANGE UP (ref 0.5–1.3)
EGFR: 90 ML/MIN/1.73M2 — SIGNIFICANT CHANGE UP
GLUCOSE SERPL-MCNC: 111 MG/DL — HIGH (ref 70–99)
HCT VFR BLD CALC: 37.2 % — LOW (ref 39–50)
HGB BLD-MCNC: 12.7 G/DL — LOW (ref 13–17)
MCHC RBC-ENTMCNC: 32.5 PG — SIGNIFICANT CHANGE UP (ref 27–34)
MCHC RBC-ENTMCNC: 34.1 GM/DL — SIGNIFICANT CHANGE UP (ref 32–36)
MCV RBC AUTO: 95.1 FL — SIGNIFICANT CHANGE UP (ref 80–100)
PLATELET # BLD AUTO: 182 K/UL — SIGNIFICANT CHANGE UP (ref 150–400)
POTASSIUM SERPL-MCNC: 3.4 MMOL/L — LOW (ref 3.5–5.3)
POTASSIUM SERPL-SCNC: 3.4 MMOL/L — LOW (ref 3.5–5.3)
RBC # BLD: 3.91 M/UL — LOW (ref 4.2–5.8)
RBC # FLD: 13.5 % — SIGNIFICANT CHANGE UP (ref 10.3–14.5)
SODIUM SERPL-SCNC: 142 MMOL/L — SIGNIFICANT CHANGE UP (ref 135–145)
TSH SERPL-MCNC: 0.63 UU/ML — SIGNIFICANT CHANGE UP (ref 0.34–4.82)
WBC # BLD: 6.35 K/UL — SIGNIFICANT CHANGE UP (ref 3.8–10.5)
WBC # FLD AUTO: 6.35 K/UL — SIGNIFICANT CHANGE UP (ref 3.8–10.5)

## 2023-08-09 PROCEDURE — 99233 SBSQ HOSP IP/OBS HIGH 50: CPT

## 2023-08-09 RX ORDER — PREGABALIN 225 MG/1
1000 CAPSULE ORAL DAILY
Refills: 0 | Status: DISCONTINUED | OUTPATIENT
Start: 2023-08-09 | End: 2023-08-10

## 2023-08-09 RX ORDER — SODIUM CHLORIDE 9 MG/ML
75 INJECTION INTRAMUSCULAR; INTRAVENOUS; SUBCUTANEOUS ONCE
Refills: 0 | Status: COMPLETED | OUTPATIENT
Start: 2023-08-09 | End: 2023-08-09

## 2023-08-09 RX ORDER — POTASSIUM CHLORIDE 20 MEQ
40 PACKET (EA) ORAL EVERY 4 HOURS
Refills: 0 | Status: COMPLETED | OUTPATIENT
Start: 2023-08-09 | End: 2023-08-09

## 2023-08-09 RX ADMIN — Medication 81 MILLIGRAM(S): at 10:24

## 2023-08-09 RX ADMIN — Medication 40 MILLIEQUIVALENT(S): at 17:37

## 2023-08-09 RX ADMIN — SERTRALINE 50 MILLIGRAM(S): 25 TABLET, FILM COATED ORAL at 10:24

## 2023-08-09 RX ADMIN — ENOXAPARIN SODIUM 40 MILLIGRAM(S): 100 INJECTION SUBCUTANEOUS at 10:25

## 2023-08-09 RX ADMIN — PREGABALIN 1000 MICROGRAM(S): 225 CAPSULE ORAL at 14:07

## 2023-08-09 RX ADMIN — Medication 40 MILLIEQUIVALENT(S): at 21:39

## 2023-08-09 RX ADMIN — Medication 25 MILLIGRAM(S): at 10:24

## 2023-08-09 RX ADMIN — Medication 650 MILLIGRAM(S): at 21:39

## 2023-08-09 RX ADMIN — SODIUM CHLORIDE 3.13 MILLILITER(S): 9 INJECTION INTRAMUSCULAR; INTRAVENOUS; SUBCUTANEOUS at 16:00

## 2023-08-09 RX ADMIN — ATORVASTATIN CALCIUM 80 MILLIGRAM(S): 80 TABLET, FILM COATED ORAL at 21:39

## 2023-08-09 RX ADMIN — SERTRALINE 50 MILLIGRAM(S): 25 TABLET, FILM COATED ORAL at 21:39

## 2023-08-09 RX ADMIN — Medication 650 MILLIGRAM(S): at 14:50

## 2023-08-09 RX ADMIN — Medication 650 MILLIGRAM(S): at 22:30

## 2023-08-09 RX ADMIN — Medication 650 MILLIGRAM(S): at 10:24

## 2023-08-09 RX ADMIN — Medication 650 MILLIGRAM(S): at 11:08

## 2023-08-09 RX ADMIN — Medication 650 MILLIGRAM(S): at 14:08

## 2023-08-09 NOTE — PROGRESS NOTE ADULT - ASSESSMENT
Assessment:    # Weakness, increased dizziness and difficulty standing  f/u telemetry to check for any arhytmias  MR head w/o contrast; results noted above  apprec Neurology consult by Dr. Abrams  ordered blood work - ESR, CRP, B12, Folate  ordered orthostatic hypotension measurement  medication: continue Tylenol for pain, ultram 25 mg every 6 hrs PRN for moderate pain  Hx of Hypertension  Hx of Depression    #elsy knee arthritis  lidocaine patche    - Neurochecks Q 4 hour  - s/p 500 ml NS in the ED  - s/p ASA 325mg in the ED  - cont ASA 81mg , and  statin  - check fasting lipids, glucose, Hg A1C  -  PT eval aprreciated  - cont metoprolol and sertraline, home meds  - DVT proph: lovenox  - Full Code     Assessment:    # Weakness, increased dizziness and difficulty standing  - f/u telemetry to check for any arhytmias  - MR head w/o contrast; results noted above; does not show any acute findings  - apprec Neurology consult by Dr. Abrams - OPT vestibular, balance PT   - PT eval appreciated  - reviewed blood work - ESR, CRP, B12, Folate; B12 is normal but at the lower end   - Reviewed orthostatic hypotension measurement which is positive, pt to get normal saline 75 ml infused over 24 hrs; repeat orthostatic hypotension measurement today and tomorrow  - medication: continue Tylenol for pain, ultram 25 mg every 6 hrs PRN for moderate pain; cyanocobalamine 1000mg PO once daily  - Neurochecks Q 4 hour  - cont ASA 81mg , and  statin      #elsy knee arthritis   - lidocaine patche      #Hx of Hypertension  - cont metoprolol     Hx of Depression  - cont sertraline, home meds    - DVT proph: lovenox  - Full Code

## 2023-08-09 NOTE — PROGRESS NOTE ADULT - SUBJECTIVE AND OBJECTIVE BOX
Chief complaint: Weakness, increased dizziness and difficulty standing  History of Present Illness:   Pt is a pleasant 89 y/o male with a PMHx of depression, HTN, HLD who presented to [facility] ED c/o generalized weakness and difficulty standing.   Pt's daughter reported she found pt on his knees last night.  She mentioned pt has been more dizzy and weak and can not get out of bed.    Pt is AAOx3,  but cannot tell me clearly why he came to the ED today.  Pt denies headache, no chest pain/palpitations, no fevers, swallowing difficulty,   no weakness of the arms or legs, no fever, no abdominal pain,  no nausea/ vomiting, no urinary complaints.    per daughter in law, pt has been feeling low after wife passed aprox 5 years back. he has been getting weaker, follow up routinely with all his doctor appointments.     8/8/23: pt was seen and examined at bedside today. Pt NAD with a family member present.   8/9/23: Pt seen and examined at bedside today. NAD, states that he was able to walk in the hallway with the help of PT. No new complaints.     PAST MEDICAL & SURGICAL HISTORY:  Depression      HTN (hypertension)      HLD (hyperlipidemia)      No significant past surgical history            ROS: the patient denies fever, no chills, no HA, no sore throat, no blurry vision, no CP, no palpitations, no SOB, no cough, no abdominal pain, no diarrhea, no N/V, no dysuria, no leg pain, no claudication, no rash, no joint aches, no rectal pain or bleeding, no night sweats  All other systems reviewed and are negative    MEDICATIONS  (STANDING):  acetaminophen     Tablet .. 650 milliGRAM(s) Oral every 8 hours  aspirin enteric coated 81 milliGRAM(s) Oral daily  atorvastatin 20 milliGRAM(s) Oral at bedtime  diazepam    Tablet 2 milliGRAM(s) Oral once  enoxaparin Injectable 40 milliGRAM(s) SubCutaneous every 24 hours  metoprolol succinate ER 25 milliGRAM(s) Oral daily  sertraline 50 milliGRAM(s) Oral two times a day    MEDICATIONS  (PRN):                            13.9   7.13  )-----------( 190      ( 07 Aug 2023 12:19 )             40.9   08-08    141  |  109<H>  |  16  ----------------------------<  89  3.5   |  28  |  0.59    Ca    8.3<L>      08 Aug 2023 07:34  Mg     2.0     08-07    TPro  7.1  /  Alb  3.7  /  TBili  0.7  /  DBili  x   /  AST  19  /  ALT  15  /  AlkPhos  78  08-07        < from: Xray Chest 1 View- PORTABLE-Urgent (Xray Chest 1 View- PORTABLE-Urgent .) (08.07.23 @ 13:23) >  PROCEDURE DATE:  08/07/2023          INTERPRETATION:  AP chest on August 7, 2023 at 1:08 PM. Patient has   weakness.    COMPARISON: None available.    Heart size is within normal limits. Sternotomy is noted.    Lungs are clear.    IMPRESSION: Sternotomy. Clear lungs.    < end of copied text >  < from: CT Head No Cont (08.07.23 @ 13:55) >  PROCEDURE DATE:  08/07/2023          INTERPRETATION:  Noncontrast CT of the brain.    CLINICAL INDICATION:  Weakness    TECHNIQUE : Axial CT scanning of the brain was obtained from theskull   base to the vertex without the administration of intravenous contrast.    COMPARISON: None available    FINDINGS:    No acute hemorrhage, hydrocephalus, midline shift or extra-axial   collections are identified. Age-appropriate involutional and moderate   microvascular ischemic change. 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 Superimposed chronic bilateral basal ganglia lacunar   infarcts.    The orbits are not remarkable in appearance.    The visualized paranasal sinuses and tympanomastoid cavities are free of   acute disease.    IMPRESSION:    No acute hemorrhage. Right basal ganglia infarct of indeterminate age.   Brain MRI follow-up is recommended.    --- End of Report ---      < end of copied text >  < from: US Duplex Carotid Arteries Complete, Bilateral (08.07.23 @ 18:58) >  PROCEDURE DATE:  08/07/2023          INTERPRETATION:  CLINICAL INFORMATION: Cerebral vascular accident.    Dizziness.  Difficulty walking.    COMPARISON: None available.    TECHNIQUE: Grayscale, color and spectral Doppler examination of both   carotid arteries was performed.    FINDINGS:  There is mild atherosclerotic plaque at both carotid bulbs.    No elevated velocities or abnormal waveforms are encountered.    Peak systolic velocities are as follows:    RIGHT:  PROX CCA = 90 cm/s  DIST CCA = 97 cm/s  PROX ICA = 102 cm/s  DIST ICA = 116 cm/s  ECA = 89 cm/s,89 cm/s    LEFT:  PROX CCA = 135 cm/s  DIST CCA = 137 cm/s  PROX ICA = 90 cm/s  DIST ICA = 87 cm/s  ECA = 75 cm/s    Antegrade flow is noted within both vertebral arteries.    IMPRESSION: Mild atherosclerotic plaque at both carotid bulbs.  No   significant hemodynamic stenosis of either carotid artery.    Measurement of carotid stenosis is based on velocity parameters that   correlate the residual internal carotid diameter with that of the more   distal vessel in accordance with a method such as the North American   Symptomatic Carotid Endarterectomy Trial (NASCET).    < end of copied text >  < from: MR Head No Cont (08.08.23 @ 09:14) >  PROCEDURE DATE:  08/08/2023          INTERPRETATION:  CLINICAL INDICATIONS: TIA    COMPARISON: Head CT dated 8/7/2023    TECHNIQUE: MRI brain: Multiplanar, multisequence MR imaging of thebrain   are obtained without the administration of intravenous Gadavist contrast.    FINDINGS:  There is no abnormal restricted diffusion to suggest acute infarction.   Scattered periventricular and subcortical white matter T2 /FLAIR   hyperintensities are seen without mass effect, nonspecific, likely   representing severe chronic microvascular changes. Normal T2 flow-voids   are seen within  the intracranial vasculature. The lateral ventricles and   cortical sulci are age-appropriate in size andconfiguration. There is no   mass, mass effect, or extra-axial fluid collection. There is no   susceptibility artifact to suggest hemorrhage. Midline structures are   normal.  The patient is status post bilateral ocular lens replacement   surgery. Mild inflammatory mucosal changes are seen throughout the   various portions of the paranasal sinuses. The orbits and mastoid air   cells are unremarkable.    IMPRESSION: No acute infarction.    --- End of Report ---      < end of copied text >

## 2023-08-10 ENCOUNTER — TRANSCRIPTION ENCOUNTER (OUTPATIENT)
Age: 88
End: 2023-08-10

## 2023-08-10 VITALS
SYSTOLIC BLOOD PRESSURE: 127 MMHG | DIASTOLIC BLOOD PRESSURE: 72 MMHG | TEMPERATURE: 98 F | RESPIRATION RATE: 18 BRPM | HEART RATE: 64 BPM | OXYGEN SATURATION: 97 %

## 2023-08-10 LAB — SARS-COV-2 RNA SPEC QL NAA+PROBE: SIGNIFICANT CHANGE UP

## 2023-08-10 PROCEDURE — 99239 HOSP IP/OBS DSCHRG MGMT >30: CPT | Mod: GC

## 2023-08-10 RX ORDER — ASPIRIN/CALCIUM CARB/MAGNESIUM 324 MG
1 TABLET ORAL
Refills: 0 | DISCHARGE

## 2023-08-10 RX ORDER — ATORVASTATIN CALCIUM 80 MG/1
1 TABLET, FILM COATED ORAL
Qty: 0 | Refills: 0 | DISCHARGE
Start: 2023-08-10

## 2023-08-10 RX ORDER — ENOXAPARIN SODIUM 100 MG/ML
0 INJECTION SUBCUTANEOUS
Qty: 0 | Refills: 0 | DISCHARGE
Start: 2023-08-10

## 2023-08-10 RX ORDER — THIAMINE MONONITRATE (VIT B1) 100 MG
100 TABLET ORAL DAILY
Refills: 0 | Status: CANCELLED | OUTPATIENT
Start: 2023-08-10 | End: 2023-08-10

## 2023-08-10 RX ORDER — METOPROLOL TARTRATE 50 MG
1 TABLET ORAL
Refills: 0 | DISCHARGE

## 2023-08-10 RX ORDER — TRAMADOL HYDROCHLORIDE 50 MG/1
0.5 TABLET ORAL
Refills: 0 | DISCHARGE
Start: 2023-08-10

## 2023-08-10 RX ORDER — ATORVASTATIN CALCIUM 80 MG/1
1 TABLET, FILM COATED ORAL
Refills: 0 | DISCHARGE

## 2023-08-10 RX ORDER — LIDOCAINE 4 G/100G
0 CREAM TOPICAL
Qty: 0 | Refills: 0 | DISCHARGE
Start: 2023-08-10

## 2023-08-10 RX ORDER — DIAZEPAM 5 MG
1 TABLET ORAL
Qty: 0 | Refills: 0 | DISCHARGE
Start: 2023-08-10

## 2023-08-10 RX ORDER — FUROSEMIDE 40 MG
1 TABLET ORAL
Refills: 0 | DISCHARGE

## 2023-08-10 RX ORDER — METOPROLOL TARTRATE 50 MG
1 TABLET ORAL
Qty: 0 | Refills: 0 | DISCHARGE
Start: 2023-08-10

## 2023-08-10 RX ORDER — ASPIRIN/CALCIUM CARB/MAGNESIUM 324 MG
1 TABLET ORAL
Qty: 0 | Refills: 0 | DISCHARGE
Start: 2023-08-10

## 2023-08-10 RX ORDER — IBUPROFEN 200 MG
2 TABLET ORAL
Refills: 0 | DISCHARGE

## 2023-08-10 RX ORDER — MULTIVIT-MIN/FERROUS GLUCONATE 9 MG/15 ML
15 LIQUID (ML) ORAL DAILY
Refills: 0 | Status: CANCELLED | OUTPATIENT
Start: 2023-08-10 | End: 2023-08-10

## 2023-08-10 RX ORDER — ACETAMINOPHEN 500 MG
2 TABLET ORAL
Refills: 0 | DISCHARGE

## 2023-08-10 RX ORDER — MULTIVIT-MIN/FERROUS GLUCONATE 9 MG/15 ML
15 LIQUID (ML) ORAL
Qty: 0 | Refills: 0 | DISCHARGE
Start: 2023-08-10

## 2023-08-10 RX ORDER — PREGABALIN 225 MG/1
1 CAPSULE ORAL
Qty: 0 | Refills: 0 | DISCHARGE
Start: 2023-08-10

## 2023-08-10 RX ORDER — THIAMINE MONONITRATE (VIT B1) 100 MG
1 TABLET ORAL
Qty: 0 | Refills: 0 | DISCHARGE
Start: 2023-08-10

## 2023-08-10 RX ADMIN — ENOXAPARIN SODIUM 40 MILLIGRAM(S): 100 INJECTION SUBCUTANEOUS at 10:11

## 2023-08-10 RX ADMIN — SERTRALINE 50 MILLIGRAM(S): 25 TABLET, FILM COATED ORAL at 10:11

## 2023-08-10 RX ADMIN — Medication 81 MILLIGRAM(S): at 10:11

## 2023-08-10 RX ADMIN — Medication 650 MILLIGRAM(S): at 13:50

## 2023-08-10 RX ADMIN — PREGABALIN 1000 MICROGRAM(S): 225 CAPSULE ORAL at 10:11

## 2023-08-10 RX ADMIN — Medication 650 MILLIGRAM(S): at 07:01

## 2023-08-10 RX ADMIN — Medication 25 MILLIGRAM(S): at 10:11

## 2023-08-10 NOTE — DISCHARGE NOTE PROVIDER - REASON FOR ADMISSION
Weakness, increased dizziness and difficulty standing Weakness, increased dizziness and difficulty standing  Stroke

## 2023-08-10 NOTE — DIETITIAN NUTRITION RISK NOTIFICATION - ADDITIONAL COMMENTS/DIETITIAN RECOMMENDATIONS
1) Liberalize diet to regular to maximize caloric and nutrient intake.  2) Add ensure plus high protein BID to optimize PO intake (provides 350 kcal, 20g protein/ shake)   3) Obtain vitamin D 25OH level to assess nutriture  4) Please obtain weekly weights  5) Consider adding thiamine 100 mg daily 2/2 poor PO intake/ malnutrition   6) MVI w/ minerals daily to ensure 100% RDA met  7) Encourage protein-rich foods, maximize food preferences  8) Monitor bowel movements, if no BM for >3 days, consider implementing bowel regimen.  9) Consider adding appetite stimulant such as Remeron or Marinol 2/2 chronically poor appetite/ PO intake  10) Monitor and replete lytes PRN   11) Confirm goals of care regarding nutrition support   RD will continue to monitor PO intake, labs, hydration, and wt prn.

## 2023-08-10 NOTE — DIETITIAN INITIAL EVALUATION ADULT - OTHER INFO
88 y/o M with a PMHx of depression, HTN, HLD who presented to [facility] ED c/o generalized weakness and difficulty standing. Pt's daughter reported she found pt on his knees last night. She mentioned pt has been more dizzy and weak and can not get out of bed. Pt is AAOx3,  but cannot tell me clearly why he came to the ED today. Admitted for weakness and increased dizziness and difficulty standing.    Unable to obtain meaningful information 2/2 pt sleeping and unarousable at time of visit. RD obtained bedscale wt on 8/9 - 145#. No weight hx to review. Pt thin, frail appearing. NFPE reveals moderate to severe muscle/ fat wasting, pt meets criteria for PCM at this time. Recommend to liberalize diet to regular to maximize caloric and nutrient intake. Will trial Ensure Plus High Protein BID in effort to optimize PO intake. Consider adding appetite stimulant such as Remeron or Marinol 2/2 chronically poor appetite/ PO intake. Please see additional recommendations below.

## 2023-08-10 NOTE — DISCHARGE NOTE PROVIDER - DETAILS OF MALNUTRITION DIAGNOSIS/DIAGNOSES
This patient has been assessed with a concern for Malnutrition and was treated during this hospitalization for the following Nutrition diagnosis/diagnoses:     -  08/10/2023: Severe protein-calorie malnutrition

## 2023-08-10 NOTE — DISCHARGE NOTE NURSING/CASE MANAGEMENT/SOCIAL WORK - PATIENT PORTAL LINK FT
You can access the FollowMyHealth Patient Portal offered by [facility] by registering at the following website: [email or web address]. By joining [employer]’s FollowMyHealth portal, you will also be able to view your health information using other applications (apps) compatible with our system.

## 2023-08-10 NOTE — DIETITIAN INITIAL EVALUATION ADULT - PERTINENT LABORATORY DATA
08-09    142  |  110<H>  |  22  ----------------------------<  111<H>  3.4<L>   |  28  |  0.64    Ca    8.5      09 Aug 2023 07:02    A1C with Estimated Average Glucose Result: 5.7 % (08-08-23 @ 07:34)    Vitamin B12, Serum: 417 pg/mL (08-08-23 @ 16:06)  Folate, Serum: 13.4 ng/mL (08-08-23 @ 16:06)

## 2023-08-10 NOTE — DIETITIAN INITIAL EVALUATION ADULT - PERTINENT MEDS FT
MEDICATIONS  (STANDING):  acetaminophen     Tablet .. 650 milliGRAM(s) Oral every 8 hours  aspirin enteric coated 81 milliGRAM(s) Oral daily  atorvastatin 80 milliGRAM(s) Oral at bedtime  cyanocobalamin 1000 MICROGram(s) Oral daily  diazepam    Tablet 2 milliGRAM(s) Oral once  enoxaparin Injectable 40 milliGRAM(s) SubCutaneous every 24 hours  metoprolol succinate ER 25 milliGRAM(s) Oral daily  sertraline 50 milliGRAM(s) Oral two times a day    MEDICATIONS  (PRN):  lidocaine   4% Patch 2 Patch Transdermal daily PRN elsy knee arthritis  traMADol 25 milliGRAM(s) Oral every 6 hours PRN Moderate Pain (4 - 6)    Home Medications:  Advil 200 mg oral tablet: 2 tab(s) orally 3 times a day (07 Aug 2023 17:22)  aspirin 81 mg oral delayed release tablet: 1 tab(s) orally once a day (07 Aug 2023 17:22)  atorvastatin 20 mg oral tablet: 1 tab(s) orally once a day (07 Aug 2023 17:22)  furosemide 40 mg oral tablet: 1 tab(s) orally once a day as needed for water retention (07 Aug 2023 17:22)  metoprolol succinate 25 mg oral tablet, extended release: 1 tab(s) orally once a day (07 Aug 2023 17:22)  sertraline 50 mg oral tablet: 1 tab(s) orally 2 times a day (07 Aug 2023 17:22)  Tylenol 500 mg oral tablet: 2 tab(s) orally 3 times a day (07 Aug 2023 17:22)

## 2023-08-10 NOTE — PROGRESS NOTE ADULT - ASSESSMENT
Assessment:    # Weakness, increased dizziness and difficulty standing  - f/u telemetry to check for any arhytmias  - MR head w/o contrast; results noted above; does not show any acute findings  - apprec Neurology consult by Dr. Abrams - OPT vestibular, balance PT   - PT eval appreciated  - reviewed blood work - ESR, CRP, B12, Folate; B12 is normal but at the lower end   - Reviewed orthostatic hypotension measurement which is negative today  - bladder scan does not show any retention of urine  - medication: continue Tylenol for pain, ultram 25 mg every 6 hrs PRN for moderate pain; cyanocobalamine 1000mg PO once daily  - Neurochecks Q 4 hour  - cont ASA 81mg , and  statin  - DC plan    #elsy knee arthritis   - lidocaine patche      #Hx of Hypertension  - cont metoprolol     Hx of Depression  - cont sertraline, home meds    - DVT proph: lovenox  - Full Code

## 2023-08-10 NOTE — DISCHARGE NOTE PROVIDER - NSDCCPCAREPLAN_GEN_ALL_CORE_FT
PRINCIPAL DISCHARGE DIAGNOSIS  Diagnosis: Generalized weakness  Assessment and Plan of Treatment:

## 2023-08-10 NOTE — DISCHARGE NOTE PROVIDER - CARE PROVIDER_API CALL
Kurtis Edwards  Internal Medicine  [address] [place] [address]  Phone: [phone]  Fax: [phone]  Follow Up Time:

## 2023-08-10 NOTE — DISCHARGE NOTE PROVIDER - NSDCPNSUBOBJ_GEN_ALL_CORE
Pt has been seen and examined with podiatry resident, resident supervised, agree with a/p and discharge note  -[id number]-aeeb, cta  -p/a-soft, bs+  -cvs-s1s2 normal     A/P    # d/c today, time spent 45 minutes

## 2023-08-10 NOTE — PROGRESS NOTE ADULT - SUBJECTIVE AND OBJECTIVE BOX
Chief complaint: Weakness, increased dizziness and difficulty standing  History of Present Illness:   Pt is a pleasant 89 y/o male with a PMHx of depression, HTN, HLD who presented to [facility] ED c/o generalized weakness and difficulty standing.   Pt's daughter reported she found pt on his knees last night.  She mentioned pt has been more dizzy and weak and can not get out of bed.    Pt is AAOx3,  but cannot tell me clearly why he came to the ED today.  Pt denies headache, no chest pain/palpitations, no fevers, swallowing difficulty,   no weakness of the arms or legs, no fever, no abdominal pain,  no nausea/ vomiting, no urinary complaints.    per daughter in law, pt has been feeling low after wife passed aprox 5 years back. he has been getting weaker, follow up routinely with all his doctor appointments.     8/8/23: pt was seen and examined at bedside today. Pt NAD with a family member present.   8/9/23: Pt seen and examined at bedside today. NAD, states that he was able to walk in the hallway with the help of PT. No new complaints.   8/10/23: pt seen and examine at bedside today. NAD. Family member present, No new complaints.      PAST MEDICAL & SURGICAL HISTORY:  Depression      HTN (hypertension)      HLD (hyperlipidemia)      No significant past surgical history            ROS: the patient denies fever, no chills, no HA, no sore throat, no blurry vision, no CP, no palpitations, no SOB, no cough, no abdominal pain, no diarrhea, no N/V, no dysuria, no leg pain, no claudication, no rash, no joint aches, no rectal pain or bleeding, no night sweats  All other systems reviewed and are negative    MEDICATIONS  (STANDING):  acetaminophen     Tablet .. 650 milliGRAM(s) Oral every 8 hours  aspirin enteric coated 81 milliGRAM(s) Oral daily  atorvastatin 20 milliGRAM(s) Oral at bedtime  diazepam    Tablet 2 milliGRAM(s) Oral once  enoxaparin Injectable 40 milliGRAM(s) SubCutaneous every 24 hours  metoprolol succinate ER 25 milliGRAM(s) Oral daily  sertraline 50 milliGRAM(s) Oral two times a day    MEDICATIONS  (PRN):                            13.9   7.13  )-----------( 190      ( 07 Aug 2023 12:19 )             40.9   08-08    141  |  109<H>  |  16  ----------------------------<  89  3.5   |  28  |  0.59    Ca    8.3<L>      08 Aug 2023 07:34  Mg     2.0     08-07    TPro  7.1  /  Alb  3.7  /  TBili  0.7  /  DBili  x   /  AST  19  /  ALT  15  /  AlkPhos  78  08-07        < from: Xray Chest 1 View- PORTABLE-Urgent (Xray Chest 1 View- PORTABLE-Urgent .) (08.07.23 @ 13:23) >  PROCEDURE DATE:  08/07/2023          INTERPRETATION:  AP chest on August 7, 2023 at 1:08 PM. Patient has   weakness.    COMPARISON: None available.    Heart size is within normal limits. Sternotomy is noted.    Lungs are clear.    IMPRESSION: Sternotomy. Clear lungs.    < end of copied text >  < from: CT Head No Cont (08.07.23 @ 13:55) >  PROCEDURE DATE:  08/07/2023          INTERPRETATION:  Noncontrast CT of the brain.    CLINICAL INDICATION:  Weakness    TECHNIQUE : Axial CT scanning of the brain was obtained from theskull   base to the vertex without the administration of intravenous contrast.    COMPARISON: None available    FINDINGS:    No acute hemorrhage, hydrocephalus, midline shift or extra-axial   collections are identified. Age-appropriate involutional and moderate   microvascular ischemic change. 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 Superimposed chronic bilateral basal ganglia lacunar   infarcts.    The orbits are not remarkable in appearance.    The visualized paranasal sinuses and tympanomastoid cavities are free of   acute disease.    IMPRESSION:    No acute hemorrhage. Right basal ganglia infarct of indeterminate age.   Brain MRI follow-up is recommended.    --- End of Report ---      < end of copied text >  < from: US Duplex Carotid Arteries Complete, Bilateral (08.07.23 @ 18:58) >  PROCEDURE DATE:  08/07/2023          INTERPRETATION:  CLINICAL INFORMATION: Cerebral vascular accident.    Dizziness.  Difficulty walking.    COMPARISON: None available.    TECHNIQUE: Grayscale, color and spectral Doppler examination of both   carotid arteries was performed.    FINDINGS:  There is mild atherosclerotic plaque at both carotid bulbs.    No elevated velocities or abnormal waveforms are encountered.    Peak systolic velocities are as follows:    RIGHT:  PROX CCA = 90 cm/s  DIST CCA = 97 cm/s  PROX ICA = 102 cm/s  DIST ICA = 116 cm/s  ECA = 89 cm/s,89 cm/s    LEFT:  PROX CCA = 135 cm/s  DIST CCA = 137 cm/s  PROX ICA = 90 cm/s  DIST ICA = 87 cm/s  ECA = 75 cm/s    Antegrade flow is noted within both vertebral arteries.    IMPRESSION: Mild atherosclerotic plaque at both carotid bulbs.  No   significant hemodynamic stenosis of either carotid artery.    Measurement of carotid stenosis is based on velocity parameters that   correlate the residual internal carotid diameter with that of the more   distal vessel in accordance with a method such as the North American   Symptomatic Carotid Endarterectomy Trial (NASCET).    < end of copied text >  < from: MR Head No Cont (08.08.23 @ 09:14) >  PROCEDURE DATE:  08/08/2023          INTERPRETATION:  CLINICAL INDICATIONS: TIA    COMPARISON: Head CT dated 8/7/2023    TECHNIQUE: MRI brain: Multiplanar, multisequence MR imaging of thebrain   are obtained without the administration of intravenous Gadavist contrast.    FINDINGS:  There is no abnormal restricted diffusion to suggest acute infarction.   Scattered periventricular and subcortical white matter T2 /FLAIR   hyperintensities are seen without mass effect, nonspecific, likely   representing severe chronic microvascular changes. Normal T2 flow-voids   are seen within  the intracranial vasculature. The lateral ventricles and   cortical sulci are age-appropriate in size andconfiguration. There is no   mass, mass effect, or extra-axial fluid collection. There is no   susceptibility artifact to suggest hemorrhage. Midline structures are   normal.  The patient is status post bilateral ocular lens replacement   surgery. Mild inflammatory mucosal changes are seen throughout the   various portions of the paranasal sinuses. The orbits and mastoid air   cells are unremarkable.    IMPRESSION: No acute infarction.    --- End of Report ---      < end of copied text >

## 2023-08-10 NOTE — DISCHARGE NOTE PROVIDER - HOSPITAL COURSE
HPI:  Pt is a pleasant 89 y/o male with a PMHx of depression, HTN, HLD. Pt was admitted for weakness, increased dizziness and difficulty standing. MRI head w/o contrast was done showing no acute findings. Neurology evaluation was done, recommend OPT vestibular and balance PT, PT eval done. Pt deemed stable for discharge after normal telemetry, negative bladder scan and ruling out orthostatic hypotension.         Vital Signs Last 24 Hrs  T(C): 36.5 (10 Aug 2023 07:54), Max: 36.7 (09 Aug 2023 15:32)  T(F): 97.7 (10 Aug 2023 07:54), Max: 98.1 (09 Aug 2023 21:30)  HR: 64 (10 Aug 2023 07:54) (63 - 73)  BP: 127/72 (10 Aug 2023 07:54) (127/67 - 157/87)  BP(mean): 85 (09 Aug 2023 21:30) (85 - 85)  RR: 18 (10 Aug 2023 07:54) (18 - 18)  SpO2: 97% (10 Aug 2023 07:54) (96% - 97%)    Parameters below as of 10 Aug 2023 07:54  Patient On (Oxygen Delivery Method): room air      T(C): 36.5 (08-10-23 @ 07:54), Max: 36.7 (08-09-23 @ 15:32)  HR: 64 (08-10-23 @ 07:54) (63 - 73)  BP: 127/72 (08-10-23 @ 07:54) (127/67 - 157/87)  RR: 18 (08-10-23 @ 07:54) (18 - 18)  SpO2: 97% (08-10-23 @ 07:54) (96% - 97%)    CONSTITUTIONAL: Well groomed, no apparent distress  EYES: PERRLA and symmetric, EOMI, No conjunctival or scleral injection, non-icteric  ENMT: Oral mucosa with moist membranes. Normal dentition; no pharyngeal injection or exudates             NECK: Supple, symmetric and without tracheal deviation   RESP: No respiratory distress, no use of accessory muscles; CTA b/l, no WRR  CV: RRR, +S1S2, no MRG; no JVD; no peripheral edema  GI: Soft, NT, ND, no rebound, no guarding; no palpable masses; no hepatosplenomegaly; no hernia palpated  LYMPH: No cervical LAD or tenderness; no axillary LAD or tenderness; no inguinal LAD or tenderness  MSK: No digital clubbing or cyanosis; examination of the (head/neck/spine/ribs/pelvis, RUE, LUE, RLE, LLE) without misalignment,   SKIN: No rashes or ulcers noted; no subcutaneous nodules or induration palpable  NEURO: Alert, follows command  PSYCH: mood stable      08-10-23 @ 13:40 HPI:  Pt is a pleasant 89 y/o male with a PMHx of depression, HTN, HLD. Pt was admitted for weakness, increased dizziness and difficulty standing. MRI head w/o contrast was done showing no acute findings. Neurology evaluation was done, recommend OPT vestibular and balance PT, PT eval done. Etiology of his problem most likely appears to be dehydration. He was orthostatic at the time of presentation which got resolved with iv fluids.   Currently pt is feeling better and is being discharged with further management as an outpt, time spent 45 minutes   Vital Signs Last 24 Hrs  T(C): 36.5 (10 Aug 2023 07:54), Max: 36.7 (09 Aug 2023 15:32)  T(F): 97.7 (10 Aug 2023 07:54), Max: 98.1 (09 Aug 2023 21:30)  HR: 64 (10 Aug 2023 07:54) (63 - 73)  BP: 127/72 (10 Aug 2023 07:54) (127/67 - 157/87)  BP(mean): 85 (09 Aug 2023 21:30) (85 - 85)  RR: 18 (10 Aug 2023 07:54) (18 - 18)  SpO2: 97% (10 Aug 2023 07:54) (96% - 97%)    Parameters below as of 10 Aug 2023 07:54  Patient On (Oxygen Delivery Method): room air      T(C): 36.5 (08-10-23 @ 07:54), Max: 36.7 (08-09-23 @ 15:32)  HR: 64 (08-10-23 @ 07:54) (63 - 73)  BP: 127/72 (08-10-23 @ 07:54) (127/67 - 157/87)  RR: 18 (08-10-23 @ 07:54) (18 - 18)  SpO2: 97% (08-10-23 @ 07:54) (96% - 97%)    CONSTITUTIONAL: Well groomed, no apparent distress  EYES: PERRLA and symmetric, EOMI, No conjunctival or scleral injection, non-icteric  ENMT: Oral mucosa with moist membranes. Normal dentition; no pharyngeal injection or exudates             NECK: Supple, symmetric and without tracheal deviation   RESP: No respiratory distress, no use of accessory muscles; CTA b/l, no WRR  CV: RRR, +S1S2, no MRG; no JVD; no peripheral edema  GI: Soft, NT, ND, no rebound, no guarding; no palpable masses; no hepatosplenomegaly; no hernia palpated  LYMPH: No cervical LAD or tenderness; no axillary LAD or tenderness; no inguinal LAD or tenderness  MSK: No digital clubbing or cyanosis; examination of the (head/neck/spine/ribs/pelvis, RUE, LUE, RLE, LLE) without misalignment,   SKIN: No rashes or ulcers noted; no subcutaneous nodules or induration palpable  NEURO: Alert, follows command  PSYCH: mood stable      08-10-23 @ 13:40

## 2023-08-10 NOTE — DIETITIAN INITIAL EVALUATION ADULT - NSFNSGIIOFT_GEN_A_CORE
I&O's Detail    09 Aug 2023 07:01  -  10 Aug 2023 07:00  --------------------------------------------------------  IN:  Total IN: 0 mL    OUT:    Voided (mL): 500 mL  Total OUT: 500 mL    Total NET: -500 mL

## 2023-08-10 NOTE — DISCHARGE NOTE PROVIDER - NSDCMRMEDTOKEN_GEN_ALL_CORE_FT
aspirin 81 mg oral delayed release tablet: 1 tab(s) orally once a day  atorvastatin 80 mg oral tablet: 1 tab(s) orally once a day (at bedtime)  B-12 1000 mcg oral tablet: 1 tab(s) orally once a day  diazePAM 2 mg oral tablet: 1 tab(s) orally once  enoxaparin:   lidocaine 4% topical film: Apply topically to affected area once a day as needed for  moderate pain  metoprolol succinate 25 mg oral tablet, extended release: 1 tab(s) orally once a day  Multiple Vitamins with Minerals oral liquid: 15 milliliter(s) orally once a day  sertraline 50 mg oral tablet: 1 tab(s) orally 2 times a day  thiamine 100 mg oral tablet: 1 tab(s) orally once a day  traMADol 50 mg oral tablet: 0.5 tab(s) orally every 6 hours As needed Moderate Pain (4 - 6)   aspirin 81 mg oral delayed release tablet: 1 tab(s) orally once a day  atorvastatin 80 mg oral tablet: 1 tab(s) orally once a day (at bedtime)  B-12 1000 mcg oral tablet: 1 tab(s) orally once a day  lidocaine 4% topical film: Apply topically to affected area once a day as needed for  moderate pain  metoprolol succinate 25 mg oral tablet, extended release: 1 tab(s) orally once a day  Multiple Vitamins with Minerals oral liquid: 15 milliliter(s) orally once a day  sertraline 50 mg oral tablet: 1 tab(s) orally 2 times a day  thiamine 100 mg oral tablet: 1 tab(s) orally once a day  traMADol 50 mg oral tablet: 0.5 tab(s) orally every 6 hours As needed Moderate Pain (4 - 6)

## 2023-08-16 DIAGNOSIS — E43 UNSPECIFIED SEVERE PROTEIN-CALORIE MALNUTRITION: ICD-10-CM

## 2023-08-16 DIAGNOSIS — I10 ESSENTIAL (PRIMARY) HYPERTENSION: ICD-10-CM

## 2023-08-16 DIAGNOSIS — Z79.82 LONG TERM (CURRENT) USE OF ASPIRIN: ICD-10-CM

## 2023-08-16 DIAGNOSIS — G89.29 OTHER CHRONIC PAIN: ICD-10-CM

## 2023-08-16 DIAGNOSIS — M17.0 BILATERAL PRIMARY OSTEOARTHRITIS OF KNEE: ICD-10-CM

## 2023-08-16 DIAGNOSIS — E86.0 DEHYDRATION: ICD-10-CM

## 2023-08-16 DIAGNOSIS — F32.A DEPRESSION, UNSPECIFIED: ICD-10-CM

## 2023-08-16 DIAGNOSIS — H81.90 UNSPECIFIED DISORDER OF VESTIBULAR FUNCTION, UNSPECIFIED EAR: ICD-10-CM

## 2023-08-16 DIAGNOSIS — I95.1 ORTHOSTATIC HYPOTENSION: ICD-10-CM

## 2023-08-16 DIAGNOSIS — E78.5 HYPERLIPIDEMIA, UNSPECIFIED: ICD-10-CM

## 2023-10-04 ENCOUNTER — INPATIENT (INPATIENT)
Facility: HOSPITAL | Age: 88
LOS: 4 days | Discharge: NURSING, REHAB OR LONG-TERM CARE | DRG: 480 | End: 2023-10-09
Attending: INTERNAL MEDICINE | Admitting: INTERNAL MEDICINE
Payer: MEDICARE

## 2023-10-04 VITALS
OXYGEN SATURATION: 100 % | RESPIRATION RATE: 18 BRPM | HEART RATE: 61 BPM | TEMPERATURE: 98 F | SYSTOLIC BLOOD PRESSURE: 145 MMHG | HEIGHT: 65 IN | DIASTOLIC BLOOD PRESSURE: 69 MMHG

## 2023-10-04 LAB
ALBUMIN SERPL ELPH-MCNC: 3.5 G/DL — SIGNIFICANT CHANGE UP (ref 3.3–5)
ALP SERPL-CCNC: 71 U/L — SIGNIFICANT CHANGE UP (ref 40–120)
ALT FLD-CCNC: 18 U/L — SIGNIFICANT CHANGE UP (ref 12–78)
ANION GAP SERPL CALC-SCNC: 2 MMOL/L — LOW (ref 5–17)
APTT BLD: 35.4 SEC — SIGNIFICANT CHANGE UP (ref 24.5–35.6)
AST SERPL-CCNC: 16 U/L — SIGNIFICANT CHANGE UP (ref 15–37)
BASOPHILS # BLD AUTO: 0.05 K/UL — SIGNIFICANT CHANGE UP (ref 0–0.2)
BASOPHILS NFR BLD AUTO: 0.4 % — SIGNIFICANT CHANGE UP (ref 0–2)
BILIRUB SERPL-MCNC: 0.3 MG/DL — SIGNIFICANT CHANGE UP (ref 0.2–1.2)
BLD GP AB SCN SERPL QL: SIGNIFICANT CHANGE UP
BUN SERPL-MCNC: 24 MG/DL — HIGH (ref 7–23)
CALCIUM SERPL-MCNC: 8.8 MG/DL — SIGNIFICANT CHANGE UP (ref 8.5–10.1)
CHLORIDE SERPL-SCNC: 111 MMOL/L — HIGH (ref 96–108)
CO2 SERPL-SCNC: 30 MMOL/L — SIGNIFICANT CHANGE UP (ref 22–31)
CREAT SERPL-MCNC: 0.96 MG/DL — SIGNIFICANT CHANGE UP (ref 0.5–1.3)
EGFR: 76 ML/MIN/1.73M2 — SIGNIFICANT CHANGE UP
EOSINOPHIL # BLD AUTO: 0.09 K/UL — SIGNIFICANT CHANGE UP (ref 0–0.5)
EOSINOPHIL NFR BLD AUTO: 0.7 % — SIGNIFICANT CHANGE UP (ref 0–6)
GLUCOSE SERPL-MCNC: 140 MG/DL — HIGH (ref 70–99)
HCT VFR BLD CALC: 37.8 % — LOW (ref 39–50)
HGB BLD-MCNC: 12.6 G/DL — LOW (ref 13–17)
IMM GRANULOCYTES NFR BLD AUTO: 0.4 % — SIGNIFICANT CHANGE UP (ref 0–0.9)
INR BLD: 0.95 RATIO — SIGNIFICANT CHANGE UP (ref 0.85–1.18)
LYMPHOCYTES # BLD AUTO: 1.33 K/UL — SIGNIFICANT CHANGE UP (ref 1–3.3)
LYMPHOCYTES # BLD AUTO: 10.2 % — LOW (ref 13–44)
MCHC RBC-ENTMCNC: 32.1 PG — SIGNIFICANT CHANGE UP (ref 27–34)
MCHC RBC-ENTMCNC: 33.3 GM/DL — SIGNIFICANT CHANGE UP (ref 32–36)
MCV RBC AUTO: 96.2 FL — SIGNIFICANT CHANGE UP (ref 80–100)
MONOCYTES # BLD AUTO: 0.89 K/UL — SIGNIFICANT CHANGE UP (ref 0–0.9)
MONOCYTES NFR BLD AUTO: 6.8 % — SIGNIFICANT CHANGE UP (ref 2–14)
NEUTROPHILS # BLD AUTO: 10.67 K/UL — HIGH (ref 1.8–7.4)
NEUTROPHILS NFR BLD AUTO: 81.5 % — HIGH (ref 43–77)
PLATELET # BLD AUTO: 204 K/UL — SIGNIFICANT CHANGE UP (ref 150–400)
POTASSIUM SERPL-MCNC: 4.6 MMOL/L — SIGNIFICANT CHANGE UP (ref 3.5–5.3)
POTASSIUM SERPL-SCNC: 4.6 MMOL/L — SIGNIFICANT CHANGE UP (ref 3.5–5.3)
PROT SERPL-MCNC: 6.9 GM/DL — SIGNIFICANT CHANGE UP (ref 6–8.3)
PROTHROM AB SERPL-ACNC: 10.8 SEC — SIGNIFICANT CHANGE UP (ref 9.5–13)
RBC # BLD: 3.93 M/UL — LOW (ref 4.2–5.8)
RBC # FLD: 14.1 % — SIGNIFICANT CHANGE UP (ref 10.3–14.5)
SODIUM SERPL-SCNC: 143 MMOL/L — SIGNIFICANT CHANGE UP (ref 135–145)
TROPONIN I, HIGH SENSITIVITY RESULT: 10.82 NG/L — SIGNIFICANT CHANGE UP
WBC # BLD: 13.08 K/UL — HIGH (ref 3.8–10.5)
WBC # FLD AUTO: 13.08 K/UL — HIGH (ref 3.8–10.5)

## 2023-10-04 PROCEDURE — 71045 X-RAY EXAM CHEST 1 VIEW: CPT | Mod: 26

## 2023-10-04 PROCEDURE — 70450 CT HEAD/BRAIN W/O DYE: CPT | Mod: 26,MA

## 2023-10-04 PROCEDURE — 73502 X-RAY EXAM HIP UNI 2-3 VIEWS: CPT | Mod: 26,RT

## 2023-10-04 PROCEDURE — 73562 X-RAY EXAM OF KNEE 3: CPT | Mod: 26,RT

## 2023-10-04 PROCEDURE — 99285 EMERGENCY DEPT VISIT HI MDM: CPT

## 2023-10-04 PROCEDURE — 72125 CT NECK SPINE W/O DYE: CPT | Mod: 26,MA

## 2023-10-04 PROCEDURE — 73552 X-RAY EXAM OF FEMUR 2/>: CPT | Mod: 26,RT

## 2023-10-04 RX ORDER — ACETAMINOPHEN 500 MG
1000 TABLET ORAL ONCE
Refills: 0 | Status: COMPLETED | OUTPATIENT
Start: 2023-10-04 | End: 2023-10-04

## 2023-10-04 RX ADMIN — Medication 1000 MILLIGRAM(S): at 23:45

## 2023-10-04 RX ADMIN — Medication 400 MILLIGRAM(S): at 23:55

## 2023-10-04 NOTE — ED ADULT TRIAGE NOTE - CHIEF COMPLAINT QUOTE
Pt. A&OX3, BIBEMS s/p fall. EMS reports pt stumbled down 2-3 steps. Negative head strike, LOC or anticoagulation. Complains of right knee pain.

## 2023-10-04 NOTE — ED ADULT TRIAGE NOTE - GLASGOW COMA SCALE: EYE OPENING, MLM
[address] [place] [address]  Dept: [phone]       Patient:  Mary Anne Manuel  :      1947  MRN:      [id number]    Chief Complaint:  Patient presents wit tablet 0   • HYDROCHLOROTHIAZIDE 12.5 MG Oral Tab TAKE ONE TABLET BY MOUTH ONE TIME DAILY  90 tablet 0   • CLINDAMYCIN PHOSPHATE 1 % External Lotion apply 1 application topically twice daily 60 mL 0   • FLUTICASONE PROPIONATE 50 MCG/ACT Nasal Suspension sp date: 12/26/2017      Smokeless tobacco: Never Used      Tobacco comment: starting to quit. 4/22/17 is the quiting date    Vaping Use      Vaping Use: Never used    Substance and Sexual Activity      Alcohol use:  Yes        Alcohol/week: 0.0 standard drink Partner Violence:       Fear of Current or Ex-Partner:       Emotionally Abused:       Physically Abused:       Sexually Abused:   Surgical History:    Past Surgical History:   Procedure Laterality Date   •      • COLONOSCOPY  2015   • COLO Reviewed and Discussed    Continue to ambulate    ROS:    Constitutional: positive for fatigue  Respiratory: negative  Cardiovascular: negative  Gastrointestinal: negative  Musculoskeletal:positive for arthralgias and back pain  Neurological: negative  Beh scheduled       SLOW BURNER    Diagnoses and all orders for this visit:    Essential hypertension    High cholesterol    Increased appetite    Encounter for therapeutic drug monitoring    Obesity (BMI 30-39. 9)          Yamile Vazquez MD (E4) spontaneous

## 2023-10-04 NOTE — ED PROVIDER NOTE - PROGRESS NOTE DETAILS
Ortho paged as Xrays are showing fracture of right hip. Ortho saw patient.  Planning for operative management.  Will keep NPO.  Discussed with hospitalist. Patient takes metoprolol, aspirin 81mg daily, sertraline, and lipitor per daughter.

## 2023-10-04 NOTE — ED ADULT NURSE NOTE - OBJECTIVE STATEMENT
pt presents to ED s/p fall backwards. pt denies LOC and head strike. pt said he stood up in his den and "boom I fell back". pt denies cp, sob, ha, n/v/d. pt endorsing intermittent dizziness. pt endorsing sever pain to the right leg/thigh; "I can not even move it". pt has no other complaints at this time

## 2023-10-04 NOTE — ED PROVIDER NOTE - MUSCULOSKELETAL, MLM
RLE shortened, R femur/hip externally rotated w/ DROM. TTP medial R thigh, normal pedal pulses. FROM UE. No midline spinal TTP.

## 2023-10-04 NOTE — ED ADULT TRIAGE NOTE - DIRECT TO ROOM CARE INITIATED:
[FreeTextEntry1] : Gait: No limp noted. sever Intoeing gait bilaterally\par GENERAL: alert, cooperative, in NAD\par SKIN: The skin is intact, warm, pink and dry over the area examined.\par EYES: Normal conjunctiva, normal eyelids and pupils were equal and round.\par ENT: normal ears, normal nose and normal lips.\par CARDIOVASCULAR: brisk capillary refill, but no peripheral edema.\par RESPIRATORY: The patient is in no apparent respiratory distress. They're taking full deep breaths without use of accessory muscles or evidence of audible wheezes or stridor without the use of a stethoscope. Normal respiratory effort.\par ABDOMEN: not examined\par \par The skin is intact with no abrasions or lacerations. There is no erythema, ecchymosis or edema.  2+ Pulses in the extremity. Capillary fill +1 and bilateral lower extremity digits.  No lymphedema noted. There are no signs of cellulitis or infection . There are no abnormal birthmarks or skin nodules. Full sensation with palpation. The patient  denies any sense of paresthesias or numbness. \par \par Bilateral lower extremities: \par Internal and external rotation of the hip in prone is equal at about 45° bilaterally. \par TFA 10 degrees internal bilaterally consistent with internal tibial torsion. \par Full active and passive range of motion however there is bilateral Achilles spasm which we are able to break his tone with dorsiflexion of 20°. right is better than the left, left is much tighter\par Ankle joints bilaterally are stable with stress maneuvers. \par Neurologically intact with full sensation to palpation. \par Intact DTRs. \par No edema/lymphedema. \par capillary refill <2seconds
04-Oct-2023 21:17

## 2023-10-04 NOTE — ED ADULT NURSE NOTE - NSFALLHARMRISKINTERV_ED_ALL_ED
Assistance OOB with selected safe patient handling equipment if applicable/Assistance with ambulation/Communicate risk of Fall with Harm to all staff, patient, and family/Encourage patient to sit up slowly, dangle for a short time, stand at bedside before walking/Monitor gait and stability/Orthostatic vital signs/Provide patient with walking aids/Provide visual cue: red socks, yellow wristband, yellow gown, etc/Reinforce activity limits and safety measures with patient and family/Bed in lowest position, wheels locked, appropriate side rails in place/Call bell, personal items and telephone in reach/Instruct patient to call for assistance before getting out of bed/chair/stretcher/Non-slip footwear applied when patient is off stretcher/[place] to call system/Physically safe environment - no spills, clutter or unnecessary equipment/Purposeful Proactive Rounding/Room/bathroom lighting operational, light cord in reach

## 2023-10-04 NOTE — ED PROVIDER NOTE - OBJECTIVE STATEMENT
Pt is a 89y male with a PMH of HTN on baby aspirin 81mg, HLD, depression presents to the ED c/o RLE pain s/p mechanical fall at home. Per daughter, patient was seen on home camera stumbling and then falling backwards down 2-3 steps. Daughter thinks he struck his head, but he did not have LOC. Endorses R upper thigh/knee pain. Denies any other pertinent complaints. NKA.

## 2023-10-04 NOTE — ED PROVIDER NOTE - CLINICAL SUMMARY MEDICAL DECISION MAKING FREE TEXT BOX
89y male on aspirin presents s/p fall at home. RLE w/ DROM and pain. Will do XR pelvis/hip/femur to r/o fracture. CT head to r/o ICH. Likely admit. Ortho consult, cannot ambulate. Reassess.

## 2023-10-04 NOTE — ED PROVIDER NOTE - ENMT, MLM
Airway patent, Nasal mucosa clear. Mouth with normal mucosa. Throat has no vesicles, no oropharyngeal exudates and uvula is midline. No hematoma on scalp.

## 2023-10-05 ENCOUNTER — TRANSCRIPTION ENCOUNTER (OUTPATIENT)
Age: 88
End: 2023-10-05

## 2023-10-05 DIAGNOSIS — S72.009A FRACTURE OF UNSPECIFIED PART OF NECK OF UNSPECIFIED FEMUR, INITIAL ENCOUNTER FOR CLOSED FRACTURE: ICD-10-CM

## 2023-10-05 DIAGNOSIS — Z01.810 ENCOUNTER FOR PREPROCEDURAL CARDIOVASCULAR EXAMINATION: ICD-10-CM

## 2023-10-05 DIAGNOSIS — Z95.1 PRESENCE OF AORTOCORONARY BYPASS GRAFT: Chronic | ICD-10-CM

## 2023-10-05 DIAGNOSIS — R94.31 ABNORMAL ELECTROCARDIOGRAM [ECG] [EKG]: ICD-10-CM

## 2023-10-05 DIAGNOSIS — I25.10 ATHEROSCLEROTIC HEART DISEASE OF NATIVE CORONARY ARTERY WITHOUT ANGINA PECTORIS: ICD-10-CM

## 2023-10-05 PROBLEM — I10 ESSENTIAL (PRIMARY) HYPERTENSION: Chronic | Status: ACTIVE | Noted: 2023-08-07

## 2023-10-05 PROBLEM — E78.5 HYPERLIPIDEMIA, UNSPECIFIED: Chronic | Status: ACTIVE | Noted: 2023-08-07

## 2023-10-05 PROBLEM — F32.A DEPRESSION, UNSPECIFIED: Chronic | Status: ACTIVE | Noted: 2023-08-07

## 2023-10-05 LAB
24R-OH-CALCIDIOL SERPL-MCNC: 26.4 NG/ML — LOW (ref 30–80)
ABO RH CONFIRMATION: SIGNIFICANT CHANGE UP
ALBUMIN SERPL ELPH-MCNC: 3.1 G/DL — LOW (ref 3.3–5)
ALP SERPL-CCNC: 63 U/L — SIGNIFICANT CHANGE UP (ref 40–120)
ALT FLD-CCNC: 18 U/L — SIGNIFICANT CHANGE UP (ref 12–78)
ANION GAP SERPL CALC-SCNC: 2 MMOL/L — LOW (ref 5–17)
ANION GAP SERPL CALC-SCNC: 3 MMOL/L — LOW (ref 5–17)
APPEARANCE UR: CLEAR — SIGNIFICANT CHANGE UP
APTT BLD: 34.3 SEC — SIGNIFICANT CHANGE UP (ref 24.5–35.6)
APTT BLD: 34.8 SEC — SIGNIFICANT CHANGE UP (ref 24.5–35.6)
AST SERPL-CCNC: 19 U/L — SIGNIFICANT CHANGE UP (ref 15–37)
BACTERIA # UR AUTO: NEGATIVE /HPF — SIGNIFICANT CHANGE UP
BASOPHILS # BLD AUTO: 0.03 K/UL — SIGNIFICANT CHANGE UP (ref 0–0.2)
BASOPHILS NFR BLD AUTO: 0.3 % — SIGNIFICANT CHANGE UP (ref 0–2)
BILIRUB SERPL-MCNC: 0.6 MG/DL — SIGNIFICANT CHANGE UP (ref 0.2–1.2)
BILIRUB UR-MCNC: NEGATIVE — SIGNIFICANT CHANGE UP
BUN SERPL-MCNC: 16 MG/DL — SIGNIFICANT CHANGE UP (ref 7–23)
BUN SERPL-MCNC: 22 MG/DL — SIGNIFICANT CHANGE UP (ref 7–23)
CALCIUM SERPL-MCNC: 8 MG/DL — LOW (ref 8.5–10.1)
CALCIUM SERPL-MCNC: 8.4 MG/DL — LOW (ref 8.5–10.1)
CAST: SIGNIFICANT CHANGE UP /LPF
CHLORIDE SERPL-SCNC: 111 MMOL/L — HIGH (ref 96–108)
CHLORIDE SERPL-SCNC: 112 MMOL/L — HIGH (ref 96–108)
CO2 SERPL-SCNC: 28 MMOL/L — SIGNIFICANT CHANGE UP (ref 22–31)
CO2 SERPL-SCNC: 30 MMOL/L — SIGNIFICANT CHANGE UP (ref 22–31)
COLOR SPEC: YELLOW — SIGNIFICANT CHANGE UP
CREAT SERPL-MCNC: 0.7 MG/DL — SIGNIFICANT CHANGE UP (ref 0.5–1.3)
CREAT SERPL-MCNC: 0.73 MG/DL — SIGNIFICANT CHANGE UP (ref 0.5–1.3)
DIFF PNL FLD: ABNORMAL
EGFR: 86 ML/MIN/1.73M2 — SIGNIFICANT CHANGE UP
EGFR: 88 ML/MIN/1.73M2 — SIGNIFICANT CHANGE UP
EOSINOPHIL # BLD AUTO: 0.07 K/UL — SIGNIFICANT CHANGE UP (ref 0–0.5)
EOSINOPHIL NFR BLD AUTO: 0.7 % — SIGNIFICANT CHANGE UP (ref 0–6)
GLUCOSE SERPL-MCNC: 138 MG/DL — HIGH (ref 70–99)
GLUCOSE SERPL-MCNC: 140 MG/DL — HIGH (ref 70–99)
GLUCOSE UR QL: NEGATIVE MG/DL — SIGNIFICANT CHANGE UP
HCT VFR BLD CALC: 30.4 % — LOW (ref 39–50)
HCT VFR BLD CALC: 33.5 % — LOW (ref 39–50)
HCT VFR BLD CALC: 34.4 % — LOW (ref 39–50)
HGB BLD-MCNC: 10.2 G/DL — LOW (ref 13–17)
HGB BLD-MCNC: 11.3 G/DL — LOW (ref 13–17)
HGB BLD-MCNC: 11.6 G/DL — LOW (ref 13–17)
IMM GRANULOCYTES NFR BLD AUTO: 0.5 % — SIGNIFICANT CHANGE UP (ref 0–0.9)
INR BLD: 0.98 RATIO — SIGNIFICANT CHANGE UP (ref 0.85–1.18)
INR BLD: 1 RATIO — SIGNIFICANT CHANGE UP (ref 0.85–1.18)
KETONES UR-MCNC: NEGATIVE MG/DL — SIGNIFICANT CHANGE UP
LEUKOCYTE ESTERASE UR-ACNC: NEGATIVE — SIGNIFICANT CHANGE UP
LYMPHOCYTES # BLD AUTO: 1.41 K/UL — SIGNIFICANT CHANGE UP (ref 1–3.3)
LYMPHOCYTES # BLD AUTO: 13.6 % — SIGNIFICANT CHANGE UP (ref 13–44)
MAGNESIUM SERPL-MCNC: 1.8 MG/DL — SIGNIFICANT CHANGE UP (ref 1.6–2.6)
MCHC RBC-ENTMCNC: 32.4 PG — SIGNIFICANT CHANGE UP (ref 27–34)
MCHC RBC-ENTMCNC: 32.8 PG — SIGNIFICANT CHANGE UP (ref 27–34)
MCHC RBC-ENTMCNC: 33.2 PG — SIGNIFICANT CHANGE UP (ref 27–34)
MCHC RBC-ENTMCNC: 33.6 GM/DL — SIGNIFICANT CHANGE UP (ref 32–36)
MCHC RBC-ENTMCNC: 33.7 GM/DL — SIGNIFICANT CHANGE UP (ref 32–36)
MCHC RBC-ENTMCNC: 33.7 GM/DL — SIGNIFICANT CHANGE UP (ref 32–36)
MCV RBC AUTO: 96.5 FL — SIGNIFICANT CHANGE UP (ref 80–100)
MCV RBC AUTO: 97.2 FL — SIGNIFICANT CHANGE UP (ref 80–100)
MCV RBC AUTO: 98.5 FL — SIGNIFICANT CHANGE UP (ref 80–100)
MONOCYTES # BLD AUTO: 0.96 K/UL — HIGH (ref 0–0.9)
MONOCYTES NFR BLD AUTO: 9.2 % — SIGNIFICANT CHANGE UP (ref 2–14)
NEUTROPHILS # BLD AUTO: 7.88 K/UL — HIGH (ref 1.8–7.4)
NEUTROPHILS NFR BLD AUTO: 75.7 % — SIGNIFICANT CHANGE UP (ref 43–77)
NITRITE UR-MCNC: NEGATIVE — SIGNIFICANT CHANGE UP
PH UR: 5 — SIGNIFICANT CHANGE UP (ref 5–8)
PHOSPHATE SERPL-MCNC: 3.2 MG/DL — SIGNIFICANT CHANGE UP (ref 2.5–4.5)
PLATELET # BLD AUTO: 149 K/UL — LOW (ref 150–400)
PLATELET # BLD AUTO: 183 K/UL — SIGNIFICANT CHANGE UP (ref 150–400)
PLATELET # BLD AUTO: 184 K/UL — SIGNIFICANT CHANGE UP (ref 150–400)
POTASSIUM SERPL-MCNC: 4 MMOL/L — SIGNIFICANT CHANGE UP (ref 3.5–5.3)
POTASSIUM SERPL-MCNC: 4.2 MMOL/L — SIGNIFICANT CHANGE UP (ref 3.5–5.3)
POTASSIUM SERPL-SCNC: 4 MMOL/L — SIGNIFICANT CHANGE UP (ref 3.5–5.3)
POTASSIUM SERPL-SCNC: 4.2 MMOL/L — SIGNIFICANT CHANGE UP (ref 3.5–5.3)
PROT SERPL-MCNC: 6.1 GM/DL — SIGNIFICANT CHANGE UP (ref 6–8.3)
PROT UR-MCNC: SIGNIFICANT CHANGE UP MG/DL
PROTHROM AB SERPL-ACNC: 11.1 SEC — SIGNIFICANT CHANGE UP (ref 9.5–13)
PROTHROM AB SERPL-ACNC: 11.3 SEC — SIGNIFICANT CHANGE UP (ref 9.5–13)
RBC # BLD: 3.15 M/UL — LOW (ref 4.2–5.8)
RBC # BLD: 3.4 M/UL — LOW (ref 4.2–5.8)
RBC # BLD: 3.54 M/UL — LOW (ref 4.2–5.8)
RBC # FLD: 14.1 % — SIGNIFICANT CHANGE UP (ref 10.3–14.5)
RBC CASTS # UR COMP ASSIST: 6 /HPF — HIGH (ref 0–4)
SODIUM SERPL-SCNC: 143 MMOL/L — SIGNIFICANT CHANGE UP (ref 135–145)
SODIUM SERPL-SCNC: 143 MMOL/L — SIGNIFICANT CHANGE UP (ref 135–145)
SP GR SPEC: >1.03 — HIGH (ref 1–1.03)
SQUAMOUS # UR AUTO: 0 /HPF — SIGNIFICANT CHANGE UP (ref 0–5)
T3 SERPL-MCNC: 83 NG/DL — SIGNIFICANT CHANGE UP (ref 80–200)
T4 AB SER-ACNC: 6 UG/DL — SIGNIFICANT CHANGE UP (ref 4.6–12)
TSH SERPL-MCNC: 0.74 UU/ML — SIGNIFICANT CHANGE UP (ref 0.34–4.82)
UROBILINOGEN FLD QL: 1 MG/DL — SIGNIFICANT CHANGE UP (ref 0.2–1)
WBC # BLD: 10.4 K/UL — SIGNIFICANT CHANGE UP (ref 3.8–10.5)
WBC # BLD: 11.08 K/UL — HIGH (ref 3.8–10.5)
WBC # BLD: 12.84 K/UL — HIGH (ref 3.8–10.5)
WBC # FLD AUTO: 10.4 K/UL — SIGNIFICANT CHANGE UP (ref 3.8–10.5)
WBC # FLD AUTO: 11.08 K/UL — HIGH (ref 3.8–10.5)
WBC # FLD AUTO: 12.84 K/UL — HIGH (ref 3.8–10.5)
WBC UR QL: 2 /HPF — SIGNIFICANT CHANGE UP (ref 0–5)

## 2023-10-05 PROCEDURE — 76000 FLUOROSCOPY <1 HR PHYS/QHP: CPT

## 2023-10-05 PROCEDURE — 80053 COMPREHEN METABOLIC PANEL: CPT

## 2023-10-05 PROCEDURE — 85025 COMPLETE CBC W/AUTO DIFF WBC: CPT

## 2023-10-05 PROCEDURE — 86901 BLOOD TYPING SEROLOGIC RH(D): CPT

## 2023-10-05 PROCEDURE — 82962 GLUCOSE BLOOD TEST: CPT

## 2023-10-05 PROCEDURE — 93010 ELECTROCARDIOGRAM REPORT: CPT

## 2023-10-05 PROCEDURE — 36430 TRANSFUSION BLD/BLD COMPNT: CPT

## 2023-10-05 PROCEDURE — 97530 THERAPEUTIC ACTIVITIES: CPT | Mod: GP

## 2023-10-05 PROCEDURE — 99223 1ST HOSP IP/OBS HIGH 75: CPT

## 2023-10-05 PROCEDURE — 36415 COLL VENOUS BLD VENIPUNCTURE: CPT

## 2023-10-05 PROCEDURE — 81001 URINALYSIS AUTO W/SCOPE: CPT

## 2023-10-05 PROCEDURE — C1776: CPT

## 2023-10-05 PROCEDURE — 93005 ELECTROCARDIOGRAM TRACING: CPT

## 2023-10-05 PROCEDURE — 84480 ASSAY TRIIODOTHYRONINE (T3): CPT

## 2023-10-05 PROCEDURE — 76536 US EXAM OF HEAD AND NECK: CPT

## 2023-10-05 PROCEDURE — 80048 BASIC METABOLIC PNL TOTAL CA: CPT

## 2023-10-05 PROCEDURE — 97163 PT EVAL HIGH COMPLEX 45 MIN: CPT | Mod: GP

## 2023-10-05 PROCEDURE — C1713: CPT

## 2023-10-05 PROCEDURE — 84436 ASSAY OF TOTAL THYROXINE: CPT

## 2023-10-05 PROCEDURE — 85730 THROMBOPLASTIN TIME PARTIAL: CPT

## 2023-10-05 PROCEDURE — 99497 ADVNCD CARE PLAN 30 MIN: CPT | Mod: 25

## 2023-10-05 PROCEDURE — 84100 ASSAY OF PHOSPHORUS: CPT

## 2023-10-05 PROCEDURE — P9016: CPT

## 2023-10-05 PROCEDURE — 85027 COMPLETE CBC AUTOMATED: CPT

## 2023-10-05 PROCEDURE — 86900 BLOOD TYPING SEROLOGIC ABO: CPT

## 2023-10-05 PROCEDURE — 85610 PROTHROMBIN TIME: CPT

## 2023-10-05 PROCEDURE — 84443 ASSAY THYROID STIM HORMONE: CPT

## 2023-10-05 PROCEDURE — 93306 TTE W/DOPPLER COMPLETE: CPT | Mod: 26

## 2023-10-05 PROCEDURE — 76536 US EXAM OF HEAD AND NECK: CPT | Mod: 26

## 2023-10-05 PROCEDURE — 86923 COMPATIBILITY TEST ELECTRIC: CPT

## 2023-10-05 PROCEDURE — 82306 VITAMIN D 25 HYDROXY: CPT

## 2023-10-05 PROCEDURE — 86850 RBC ANTIBODY SCREEN: CPT

## 2023-10-05 PROCEDURE — 93306 TTE W/DOPPLER COMPLETE: CPT

## 2023-10-05 PROCEDURE — 99222 1ST HOSP IP/OBS MODERATE 55: CPT

## 2023-10-05 PROCEDURE — 83735 ASSAY OF MAGNESIUM: CPT

## 2023-10-05 PROCEDURE — C1889: CPT

## 2023-10-05 RX ORDER — ASPIRIN/CALCIUM CARB/MAGNESIUM 324 MG
81 TABLET ORAL DAILY
Refills: 0 | Status: DISCONTINUED | OUTPATIENT
Start: 2023-10-05 | End: 2023-10-09

## 2023-10-05 RX ORDER — TRAMADOL HYDROCHLORIDE 50 MG/1
25 TABLET ORAL EVERY 6 HOURS
Refills: 0 | Status: DISCONTINUED | OUTPATIENT
Start: 2023-10-05 | End: 2023-10-05

## 2023-10-05 RX ORDER — OXYCODONE HYDROCHLORIDE 5 MG/1
5 TABLET ORAL ONCE
Refills: 0 | Status: DISCONTINUED | OUTPATIENT
Start: 2023-10-05 | End: 2023-10-05

## 2023-10-05 RX ORDER — MORPHINE SULFATE 50 MG/1
2 CAPSULE, EXTENDED RELEASE ORAL EVERY 4 HOURS
Refills: 0 | Status: DISCONTINUED | OUTPATIENT
Start: 2023-10-05 | End: 2023-10-05

## 2023-10-05 RX ORDER — PREGABALIN 225 MG/1
1000 CAPSULE ORAL DAILY
Refills: 0 | Status: DISCONTINUED | OUTPATIENT
Start: 2023-10-05 | End: 2023-10-09

## 2023-10-05 RX ORDER — MORPHINE SULFATE 50 MG/1
2 CAPSULE, EXTENDED RELEASE ORAL EVERY 4 HOURS
Refills: 0 | Status: DISCONTINUED | OUTPATIENT
Start: 2023-10-05 | End: 2023-10-06

## 2023-10-05 RX ORDER — OXYCODONE HYDROCHLORIDE 5 MG/1
5 TABLET ORAL EVERY 4 HOURS
Refills: 0 | Status: DISCONTINUED | OUTPATIENT
Start: 2023-10-05 | End: 2023-10-06

## 2023-10-05 RX ORDER — METOPROLOL TARTRATE 50 MG
25 TABLET ORAL DAILY
Refills: 0 | Status: DISCONTINUED | OUTPATIENT
Start: 2023-10-05 | End: 2023-10-09

## 2023-10-05 RX ORDER — THIAMINE MONONITRATE (VIT B1) 100 MG
100 TABLET ORAL DAILY
Refills: 0 | Status: DISCONTINUED | OUTPATIENT
Start: 2023-10-05 | End: 2023-10-09

## 2023-10-05 RX ORDER — CEFAZOLIN SODIUM 1 G
2000 VIAL (EA) INJECTION EVERY 8 HOURS
Refills: 0 | Status: COMPLETED | OUTPATIENT
Start: 2023-10-05 | End: 2023-10-06

## 2023-10-05 RX ORDER — ONDANSETRON 8 MG/1
4 TABLET, FILM COATED ORAL ONCE
Refills: 0 | Status: DISCONTINUED | OUTPATIENT
Start: 2023-10-05 | End: 2023-10-05

## 2023-10-05 RX ORDER — SODIUM CHLORIDE 9 MG/ML
1000 INJECTION, SOLUTION INTRAVENOUS
Refills: 0 | Status: DISCONTINUED | OUTPATIENT
Start: 2023-10-05 | End: 2023-10-05

## 2023-10-05 RX ORDER — SODIUM CHLORIDE 9 MG/ML
1000 INJECTION INTRAMUSCULAR; INTRAVENOUS; SUBCUTANEOUS
Refills: 0 | Status: DISCONTINUED | OUTPATIENT
Start: 2023-10-05 | End: 2023-10-06

## 2023-10-05 RX ORDER — CEFAZOLIN SODIUM 1 G
2000 VIAL (EA) INJECTION EVERY 8 HOURS
Refills: 0 | Status: DISCONTINUED | OUTPATIENT
Start: 2023-10-05 | End: 2023-10-05

## 2023-10-05 RX ORDER — ONDANSETRON 8 MG/1
4 TABLET, FILM COATED ORAL EVERY 6 HOURS
Refills: 0 | Status: DISCONTINUED | OUTPATIENT
Start: 2023-10-05 | End: 2023-10-09

## 2023-10-05 RX ORDER — MULTIVIT-MIN/FERROUS GLUCONATE 9 MG/15 ML
15 LIQUID (ML) ORAL DAILY
Refills: 0 | Status: DISCONTINUED | OUTPATIENT
Start: 2023-10-05 | End: 2023-10-09

## 2023-10-05 RX ORDER — SENNA PLUS 8.6 MG/1
2 TABLET ORAL AT BEDTIME
Refills: 0 | Status: DISCONTINUED | OUTPATIENT
Start: 2023-10-05 | End: 2023-10-09

## 2023-10-05 RX ORDER — SERTRALINE 25 MG/1
50 TABLET, FILM COATED ORAL
Refills: 0 | Status: DISCONTINUED | OUTPATIENT
Start: 2023-10-05 | End: 2023-10-09

## 2023-10-05 RX ORDER — MORPHINE SULFATE 50 MG/1
4 CAPSULE, EXTENDED RELEASE ORAL EVERY 6 HOURS
Refills: 0 | Status: DISCONTINUED | OUTPATIENT
Start: 2023-10-05 | End: 2023-10-06

## 2023-10-05 RX ORDER — FENTANYL CITRATE 50 UG/ML
25 INJECTION INTRAVENOUS
Refills: 0 | Status: DISCONTINUED | OUTPATIENT
Start: 2023-10-05 | End: 2023-10-05

## 2023-10-05 RX ORDER — MORPHINE SULFATE 50 MG/1
4 CAPSULE, EXTENDED RELEASE ORAL EVERY 4 HOURS
Refills: 0 | Status: DISCONTINUED | OUTPATIENT
Start: 2023-10-05 | End: 2023-10-05

## 2023-10-05 RX ORDER — ACETAMINOPHEN 500 MG
650 TABLET ORAL EVERY 6 HOURS
Refills: 0 | Status: DISCONTINUED | OUTPATIENT
Start: 2023-10-05 | End: 2023-10-09

## 2023-10-05 RX ORDER — ENOXAPARIN SODIUM 100 MG/ML
40 INJECTION SUBCUTANEOUS EVERY 24 HOURS
Refills: 0 | Status: DISCONTINUED | OUTPATIENT
Start: 2023-10-06 | End: 2023-10-09

## 2023-10-05 RX ORDER — METOPROLOL TARTRATE 50 MG
25 TABLET ORAL DAILY
Refills: 0 | Status: DISCONTINUED | OUTPATIENT
Start: 2023-10-05 | End: 2023-10-05

## 2023-10-05 RX ORDER — MORPHINE SULFATE 50 MG/1
2 CAPSULE, EXTENDED RELEASE ORAL ONCE
Refills: 0 | Status: DISCONTINUED | OUTPATIENT
Start: 2023-10-05 | End: 2023-10-05

## 2023-10-05 RX ORDER — ATORVASTATIN CALCIUM 80 MG/1
80 TABLET, FILM COATED ORAL AT BEDTIME
Refills: 0 | Status: DISCONTINUED | OUTPATIENT
Start: 2023-10-05 | End: 2023-10-09

## 2023-10-05 RX ORDER — LIDOCAINE 4 G/100G
1 CREAM TOPICAL DAILY
Refills: 0 | Status: DISCONTINUED | OUTPATIENT
Start: 2023-10-05 | End: 2023-10-09

## 2023-10-05 RX ADMIN — Medication 25 MILLIGRAM(S): at 10:21

## 2023-10-05 RX ADMIN — Medication 2 DROP(S): at 10:22

## 2023-10-05 RX ADMIN — SODIUM CHLORIDE 80 MILLILITER(S): 9 INJECTION INTRAMUSCULAR; INTRAVENOUS; SUBCUTANEOUS at 04:04

## 2023-10-05 RX ADMIN — SODIUM CHLORIDE 75 MILLILITER(S): 9 INJECTION, SOLUTION INTRAVENOUS at 20:56

## 2023-10-05 RX ADMIN — MORPHINE SULFATE 2 MILLIGRAM(S): 50 CAPSULE, EXTENDED RELEASE ORAL at 04:04

## 2023-10-05 RX ADMIN — SERTRALINE 50 MILLIGRAM(S): 25 TABLET, FILM COATED ORAL at 10:21

## 2023-10-05 RX ADMIN — MORPHINE SULFATE 2 MILLIGRAM(S): 50 CAPSULE, EXTENDED RELEASE ORAL at 04:27

## 2023-10-05 NOTE — H&P ADULT - TIME BILLING
A minimum of 75 min was spent completing this admission not including time spent discussing advanced care planning or discussing goals of care with a minimum of 36 min spent face to face with patient while in ED unit on admission, counseling patient on current acute on chronic conditions and discussing plan and coordination of care including diagnostic imaging such as TTE and thyroid US, diagnostic testing such and performance of  straight cath for sterile urine, diagnostic laboratory tests which were ordered and reasoning behind each test, discussion of consultants ordered to evaluate patient in am and reasoning behind each specific need such as cardiology for optimization due to EKG changes and  also predicted possible discharge planning discussed ie [facility] vs home with home services.

## 2023-10-05 NOTE — H&P ADULT - NSICDXPASTMEDICALHX_GEN_ALL_CORE_FT
PAST MEDICAL HISTORY:  CAD (coronary artery disease)     CVA (cerebrovascular accident)     Depression     HLD (hyperlipidemia)     HTN (hypertension)     S/P CABG (coronary artery bypass graft)

## 2023-10-05 NOTE — H&P ADULT - VTE RISK ASSESSMENT
"Subjective:     CC:  Diagnoses of Flexural eczema, Exercise-induced asthma, and Vaginal irritation were pertinent to this visit.    HISTORY OF THE PRESENT ILLNESS: Patient is a 18 y.o. female. This pleasant patient is here today to establish care and discuss previous infections.her prior PCP was her pediatrician Dr. Schneider    Vaginal irritation  Patient presents today with concerns about vaginal irritation.  Patient states that around her menstrual cycle paired With the heat over the last several months she has noticed an increase in vaginal irritation.  Patient states that her labia feel dry.  Patient states that it has felt agitated with a slight increase in discharge.  Patient's last menstrual period was August 18.  Patient states that it was normal.  Patient is not sexually active.  Patient denies concern for STI.  Patient states that symptoms are improved at this time but presents today due to concern about recurrence.  Patient states that the labia is still dry but no longer itchy or irritated.  Patient states that she does have a pimple-like bump in the area as well.  Patient states that it is small and she is able to \"pop it.\"    Flexural eczema  Patient continues to use Kenalog ointment for eczema on hands as needed.    Exercise-induced asthma  Patient notes that she does have exercise-induced asthma.  Patient has an albuterol inhaler if necessary but does not use it frequently.  Patient states that she is getting exercise by walking around campus.       ROS:   Gen: no fevers/chills  Pulm: no sob, no cough  CV: no chest pain, no palpitations  GI: no nausea/vomiting  : no dysuria  MSk: no myalgias  Skin: no rash  Neuro: no headaches    Objective:     Exam: /64 (BP Location: Right arm, Patient Position: Sitting, BP Cuff Size: Adult)   Pulse 85   Temp (!) 35.8 °C (96.4 °F) (Temporal)   Resp 16   Ht 1.753 m (5' 9\")   Wt 73 kg (161 lb)   SpO2 96%  Body mass index is 23.78 kg/m².    General: Normal " appearing. No distress.  HEENT: Normocephalic. Eyes conjunctiva clear lids without ptosis, pupils equal and reactive to light accommodation, ears normal shape and contour, canals are clear bilaterally, tympanic membranes are benign  Pulmonary: Clear to ausculation.  Normal effort. No rales, ronchi, or wheezing.  Cardiovascular: Regular rate and rhythm without murmur.   Abdomen: Soft, nontender, nondistended  Neurologic: Grossly nonfocal  Lymph: No cervical or supraclavicular lymph nodes are palpable  Skin: Warm and dry.  No obvious lesions.  Musculoskeletal: Normal gait.   Psych: Normal mood and affect. Alert and oriented x3. Judgment and insight is normal.    Assessment & Plan:   18 y.o. female with the following -  1. Flexural eczema  Chronic, stable.  Patient continues on Kenalog cream as needed.    2. Exercise-induced asthma  Chronic, Stable.  Patient has albuterol inhaler as needed.    3. Vaginal irritation  Acute, improved.  Patient states that skin was previously irritated and itchy but now is just concerned about dryness.  Discussed possibility of yeast infection due to change in pH with menstruation.  Patient encouraged to use hot compresses on possible cyst and encouraged to follow-up if it does not improve or decrease in size.  Patient encouraged to use over-the-counter Monistat to improve yeast infection symptoms were to follow-up for testing and medication if symptoms continue.    Return in about 1 year (around 8/30/2022), or if symptoms worsen or fail to improve.    Please note that this dictation was created using voice recognition software. I have made every reasonable attempt to correct obvious errors, but I expect that there are errors of grammar and possibly content that I did not discover before finalizing the note.   <<--- Click to launch

## 2023-10-05 NOTE — CONSULT NOTE ADULT - SUBJECTIVE AND OBJECTIVE BOX
CHIEF COMPLAINT: "My leg hurts when I try to move."    HPI:  89-year-old man with a history of severe CAD s/p remote CABG, HTN, HLD, ? CVA, memory loss / dementia, who was brought to the ED following a fall.  Mr Tafoya is unable to describe details of events prior to arrival to ED - describes a fall but mechanism unclear.  He was found to have a R femoral fracture.  He only has orthopedic complaints this AM (pain in R leg w/ movement); no dyspnea, angina, palpitations, syncope.    PAST MEDICAL & SURGICAL HISTORY:  Depression  HTN (hypertension)  HLD (hyperlipidemia)  CAD (coronary artery disease)  S/P CABG (coronary artery bypass graft)  CVA (cerebrovascular accident)    SOCIAL HISTORY:   Alcohol: Denied  Smoking: Nonsmoker    FAMILY HISTORY: Details not known to patient    MEDICATIONS  (STANDING):  aspirin enteric coated 81 milliGRAM(s) Oral daily  atorvastatin 80 milliGRAM(s) Oral at bedtime  cyanocobalamin 1000 MICROGram(s) Oral daily  lidocaine   4% Patch 1 Patch Transdermal daily  metoprolol succinate ER 25 milliGRAM(s) Oral daily  multivitamin/minerals/iron Oral Solution (CENTRUM) 15 milliLiter(s) Oral daily  senna 2 Tablet(s) Oral at bedtime  sertraline 50 milliGRAM(s) Oral two times a day  sodium chloride 0.9%. 1000 milliLiter(s) (80 mL/Hr) IV Continuous <Continuous>  thiamine 100 milliGRAM(s) Oral daily    MEDICATIONS  (PRN):  acetaminophen     Tablet .. 650 milliGRAM(s) Oral every 6 hours PRN Mild Pain (1 - 3)  artificial tears (preservative free) Ophthalmic Solution 2 Drop(s) Both EYES four times a day PRN Dry Eyes  morphine  - Injectable 4 milliGRAM(s) IV Push every 6 hours PRN Severe Pain (7 - 10)  morphine  - Injectable 2 milliGRAM(s) IV Push every 4 hours PRN Moderate Pain (4 - 6)  ondansetron Injectable 4 milliGRAM(s) IV Push every 6 hours PRN Nausea and/or Vomiting  oxyCODONE    IR 5 milliGRAM(s) Oral every 4 hours PRN Mild Pain (1 - 3)    Allergies: No Known Allergies    REVIEW OF SYSTEMS:  CONSTITUTIONAL: No fevers or chills  Eyes: No visual changes  NECK: No pain or stiffness  RESPIRATORY:  No shortness of breath  CARDIOVASCULAR: No chest pain or palpitations  GASTROINTESTINAL: No abdominal pain. No nausea, vomiting.  GENITOURINARY: No dysuria, frequency or hematuria  NEUROLOGICAL: No numbness.  SKIN: No itching or rash  All other review of systems is negative unless indicated above    VITAL SIGNS:   Vital Signs Last 24 Hrs  T(C): 36.4 (05 Oct 2023 08:00), Max: 36.8 (05 Oct 2023 03:50)  T(F): 97.5 (05 Oct 2023 08:00), Max: 98.2 (05 Oct 2023 03:50)  HR: 77 (05 Oct 2023 08:00) (61 - 77)  BP: 146/72 (05 Oct 2023 08:00) (141/95 - 146/72)  RR: 18 (05 Oct 2023 08:00) (18 - 18)  SpO2: 98% (05 Oct 2023 08:00) (98% - 100%)  Patient On (Oxygen Delivery Method): room ai    PHYSICAL EXAM:  Constitutional: NAD, awake and alert  HEENT:  EOMI,  Pupils round, No oral cyanosis.  Pulmonary: Non-labored, breath sounds are clear bilaterally, No wheezing, rales or rhonchi  Cardiovascular: S1 and S2, regular rate and rhythm, no Murmurs, gallops or rubs  Gastrointestinal: Bowel Sounds present, soft, nontender.   Lymph: No peripheral edema. No cervical lymphadenopathy.  Neurological: Alert, no focal deficits  Skin: No rashes.  Psych:  Mood & affect appropriate    LABS:                11.3   10.40 )-----------( 184      ( 05 Oct 2023 07:11 )             33.5           143    |  111    |  22     ----------------------------<  140    4.0     |  30     |  0.70     TPro  6.1    /  Alb  3.1    /  TBili  0.6    /  DBili  x      /  AST  19     /  ALT  18     /  AlkPhos  63     05 Oct 2023 07:11    PT/INR - ( 05 Oct 2023 07:11 )   PT: 11.3 sec;   INR: 1.00 ratio    PTT - ( 05 Oct 2023 07:11 )  PTT:34.3 sec    TSH: 0.74    ECG: Sinus rhythm [id number] degree A-V block  Left anterior fascicular block  Non-specific intra-ventricular conduction delay  Abnormal ECG      
89y Male community ambulatory presents c/o R hip pain and inability to ambulate sp mechanical fall. Denies HS/LOC. Denies numbness/tingling. Denies fever/chills. Denies pain/injury elsewhere. Aspirin 81 daily. W/ cane & walker occasionally; Lives w/ Daughter Kathryn [phone]      MEDICATIONS  (STANDING):  sodium chloride 0.9%. 1000 milliLiter(s) IV Continuous <Continuous>    Allergies    No Known Allergies    Intolerances                            12.6   13.08 )-----------( 204      ( 04 Oct 2023 22:34 )             37.8     04 Oct 2023 22:34    143    |  111    |  24     ----------------------------<  140    4.6     |  30     |  0.96     Ca    8.8        04 Oct 2023 22:34    TPro  6.9    /  Alb  3.5    /  TBili  0.3    /  DBili  x      /  AST  16     /  ALT  18     /  AlkPhos  71     04 Oct 2023 22:34    PT/INR - ( 04 Oct 2023 22:34 )   PT: 10.8 sec;   INR: 0.95 ratio         PTT - ( 04 Oct 2023 22:34 )  PTT:35.4 sec  Vital Signs Last 24 Hrs  T(C): 36.5 (10-04-23 @ 21:15), Max: 36.5 (10-04-23 @ 21:15)  T(F): 97.7 (10-04-23 @ 21:15), Max: 97.7 (10-04-23 @ 21:15)  HR: 61 (10-04-23 @ 21:15) (61 - 61)  BP: 145/69 (10-04-23 @ 21:15) (145/69 - 145/69)  BP(mean): --  RR: 18 (10-04-23 @ 21:15) (18 - 18)  SpO2: 100% (10-04-23 @ 21:15) (100% - 100%)  Imaging: XR demonstates R/L hip fracture    Physical Exam  General: NAD, Alert, Awake and oriented  Neurologic: No gross deficits, moving all 4s.  Head: NCAT without abrasions, lacerations, or ecchymosis to head, face, or scalp  Eyes: PERRL  Neck/C-Spine: FAROM. No bony TTP.  T/L Spine: No bony TTP, no palpable step-off.    HIPS and PELVIS: Unable to SLR R hip      RLE:  Skin intact, no erythema, ecchymosis, edema  RLE Shortened / ER   NTTP over the bony prominences of the knee/ankle/foot  painless passive/active ROM of the ankle/foot  L2-S1 SILT, motor grossly intact throughout hip flexors/quads/hams/TA/EHL/FHL/GSC  + DP/PT pulses  compartments soft and compressible, calves nontender      SECONDARY EXAM: Skin intact, SILT throughout, motor grossly intact throughout, no other orthopedic injuries at this time, compartments soft and compressible      A/P: 89y Male with R hip fracture    Plan for IMN 10/5 w/ Dr. Vazquez  NPO after breakfast  Preop labs  CXR/EKG  Pain control  NWB RLE, bedrest  FU labs/imaging  Ca/Vit D  Outpt osteoporosis workup  Admit to medical team  Please document Medical optimization for OR

## 2023-10-05 NOTE — PATIENT PROFILE ADULT - FALL HARM RISK - HARM RISK INTERVENTIONS
Assistance with ambulation/Assistance OOB with selected safe patient handling equipment/Communicate Risk of Fall with Harm to all staff/Discuss with provider need for PT consult/Monitor gait and stability/Provide patient with walking aids - walker, cane, crutches/Reinforce activity limits and safety measures with patient and family/Tailored Fall Risk Interventions/Use of alarms - bed, chair and/or voice tab/Visual Cue: Yellow wristband and red socks/Bed in lowest position, wheels locked, appropriate side rails in place/Call bell, personal items and telephone in reach/Instruct patient to call for assistance before getting out of bed or chair/Non-slip footwear when patient is out of bed/[place] to call system/Physically safe environment - no spills, clutter or unnecessary equipment/Purposeful Proactive Rounding/Room/bathroom lighting operational, light cord in reach

## 2023-10-05 NOTE — PROGRESS NOTE ADULT - SUBJECTIVE AND OBJECTIVE BOX
Patient seen and examined at bedside.  No acute complaints at this time. Pain well controlled. Denies chest pain, shortness of breath, nausea or vomiting.     PE:  Vital Signs Last 24 Hrs  T(C): 36.8 (10-05-23 @ 03:50), Max: 36.8 (10-05-23 @ 03:50)  T(F): 98.2 (10-05-23 @ 03:50), Max: 98.2 (10-05-23 @ 03:50)  HR: 73 (10-05-23 @ 03:50) (61 - 73)  BP: 141/95 (10-05-23 @ 03:50) (141/95 - 145/69)  BP(mean): --  RR: 18 (10-05-23 @ 03:50) (18 - 18)  SpO2: 99% (10-05-23 @ 03:50) (99% - 100%)    General: NAD, resting comfortably in bed  RLE:   Shortened / ER  Compartments soft and compressible  No calf tenderness bilaterally  +TA/EHL/FHL/GSC  SILT L3-S1  + DP/PT                            11.6   11.08 )-----------( 183      ( 05 Oct 2023 05:19 )             34.4     10-04    143  |  111<H>  |  24<H>  ----------------------------<  140<H>  4.6   |  30  |  0.96    Ca    8.8      04 Oct 2023 22:34    TPro  6.9  /  Alb  3.5  /  TBili  0.3  /  DBili  x   /  AST  16  /  ALT  18  /  AlkPhos  71  10-04    PT/INR - ( 04 Oct 2023 22:34 )   PT: 10.8 sec;   INR: 0.95 ratio         PTT - ( 04 Oct 2023 22:34 )  PTT:35.4 sec    A/P:  89y m w/ R IT Fx    -NPO after breakfast for OR approx 1700  -IVF While NPO  -Please document medical / cardiac risk stratification  -TTE ordered for cards eval   -PReop labs (CBC, BMP, PT/PTT/INR, T&S  -NWB RLE   -Pain Control  -DVT ppx w/ SCDs  -[employer] VTE ppx for OR   -Rest, ice the extremity as we needed  -Incentive Spirometry  -Medical management appreciated

## 2023-10-05 NOTE — DISCHARGE NOTE PROVIDER - CARE PROVIDER_API CALL
Antonio Vazquez  Orthopaedic Surgery  [address] [place] [address]  Phone: [phone]  Fax: [phone]  Follow Up Time:

## 2023-10-05 NOTE — ED ADULT NURSE REASSESSMENT NOTE - NS ED NURSE REASSESS COMMENT FT1
pt unable to provide urine at this time. admit MD made aware and does not want catheterization at this time. pt states he will notify RN when he needs to use bathroom.

## 2023-10-05 NOTE — PROGRESS NOTE ADULT - SUBJECTIVE AND OBJECTIVE BOX
Patient seen and examined post op  Complaining of controlled rt hip pain        PE:  Dressing D/C/I  NVI RLE  Compartments soft all extremities  FHL/EHL/TA/GS intact RLE

## 2023-10-05 NOTE — H&P ADULT - HISTORY OF PRESENT ILLNESS
Patient is a 89-year-old male with a past medical history of triple-vessel bypass in 2003 with Dr. Vital, CAD, hyperlipidemia, heart murmur, osteoarthritis, stroke in August 2023, memory loss who presents to ED status post witnessed fall at home while going up 3 steps.  Patient states he was going up and trying to lean forward to go up the next step when he fell backward.  Patient states he did not lose consciousness.  Patient states he did not hit his head however this is questionable as patient was filmed by family member and may have hit head on chair slightly.  Patient denies this.  Patient states he had no symptoms prior to fall and that he simply lost his stepping.  Patient states after fall he was unable to get himself up due to pain.  Family states they also tried to help him up however were unable as patient was in pain prompting his visit to [facility] ED.    Review of systems–denies chest pain palpitation shortness of breath nausea vomiting diarrhea abdominal pain leg swelling fever chills rash dysuria hematuria cough headache paresthesias changes in vision/hearing/speech. Patient is a 89-year-old male with a past medical history of triple-vessel bypass in 2003 with Dr. Vital, CAD, hyperlipidemia, heart murmur, osteoarthritis, recent admission in August 2023 for fall attributed to dehydration, memory loss who presents to ED status post witnessed fall at home while going up 3 steps.  Patient states he was going up and trying to lean forward to go up the next step when he fell backward.  Patient states he did not lose consciousness.  Patient states he did not hit his head however this is questionable as patient was filmed by family member and may have hit head on chair slightly.  Patient denies this.  Patient states he had no symptoms prior to fall and that he simply lost his stepping.  Patient states after fall he was unable to get himself up due to pain.  Family states they also tried to help him up however were unable as patient was in pain prompting his visit to [facility] ED.    Review of systems–denies chest pain palpitation shortness of breath nausea vomiting diarrhea abdominal pain leg swelling fever chills rash dysuria hematuria cough headache paresthesias changes in vision/hearing/speech.

## 2023-10-05 NOTE — DISCHARGE NOTE PROVIDER - NSDCFUADDINST_GEN_ALL_CORE_FT
Discharge Instructions for Right Hip IMN:    1. PAIN CONTROL: See Med Rec.  2. ACTIVITY: Weight Bearing as Tolerated with assistance and rolling walker  3. PT: daily  4. DVT/PE PROPHYLAXIS: Continue DVT/PE Prophylaxis. See Med Rec for Duration and dose.  5. BANDAGE: Change dressing to a new Mepilex Ag bandage POD7 (10/12/23). May change sooner if dressing saturated or falling off. DO NOT REMOVE BANDAGE TO CHECK WOUND ON INTAKE.  6. STAPLES: RN Remove Staples POD14 (10/19/23).  7. SHOWER: Okay to shower. Do not soak, submerge or let shower stream beat on dressing/wound.  8. FOLLOW UP: Follow-up with Orthopedic Surgeon Dr. Vazquez in 14 Days. Call Office For Appointment.

## 2023-10-05 NOTE — DISCHARGE NOTE PROVIDER - NSDCCPCAREPLAN_GEN_ALL_CORE_FT
PRINCIPAL DISCHARGE DIAGNOSIS  Diagnosis: Closed fracture of hip  Assessment and Plan of Treatment:      PRINCIPAL DISCHARGE DIAGNOSIS  Diagnosis: Closed fracture of hip  Assessment and Plan of Treatment: pain meds as needed  physical therapy at rehab.   you were started on flomax for difficulty voiding  see ortho post op instructions

## 2023-10-05 NOTE — CONSULT NOTE ADULT - PROBLEM SELECTOR RECOMMENDATION 9
Mr. Tafoya has several clinical predictors of shahla-op cardiac complications -- including advanced age, poor functional status, abnormal ECG, CVA (lacunar), CAD.  I reviewed today's echo (normal LV function, mild-moderate valvular disease), ECG (abnormal but similar to recent tracing).  He is at intermediate cardiac risk and as optimized as is reasonably attainable for necessary surgery. Continue shahla-op metoprolol.

## 2023-10-05 NOTE — DISCHARGE NOTE PROVIDER - HOSPITAL COURSE
Orthopedic Summary  H&P:  Pt is a 89y Male   PAST MEDICAL & SURGICAL HISTORY:  Depression      HTN (hypertension)      HLD (hyperlipidemia)      CAD (coronary artery disease)      S/P CABG (coronary artery bypass graft)      CVA (cerebrovascular accident)      S/P CABG (coronary artery bypass graft)            Now s/p Right Hip IM Nail for fracture. Pt is afebrile with stable vital signs. Pain is controlled. Exam reveals intact EHL FHL TA GS, +DP. Dressing is clean and dry.    Hospital Course:  Patient presented to [facility] ED after a fall, found to have a hip fracture, and admitted to the Medical Service. Pt was medically/cardiac cleared prior to surgery. Prophylactic antibiotics were started before the procedure and continued for 24 hours. They were admitted after surgery to the orthopedic floor.  There were no orthopedic complications during the hospital stay. All home medications were continued.    Routine consult was obtained from Physical Therapy and followed by both Medicine and Orthopedics for Co-management. Patient was placed on  anticoagulation.  Pertinent home medications were continued.  Daily labs were followed.      On POD 0 there were no major issues. Pt received PT daily and was Discharged once cleared per Medicine.  The orthopedic Attending is aware and agrees. See addendum to DC summary per medical team below for any additional info or if any changes. 89M, PMH of CAD/ triple-vessel bypass in 2003 with Dr. Vital, CAD, hyperlipidemia, heart murmur, osteoarthritis, recent admission in August 2023 for fall attributed to dehydration, memory loss who presents to ED status post witnessed fall at home while going up 3 steps.  Patient states he was going up and trying to lean forward to go up the next step when he fell backward.  Patient states he did not lose consciousness. He is admitted with a right hip fracture. Underwent IM nail 10/5.   post op course notable for metabolic encephalopathy. Also given 1 unit prbcs and ivf for dehydration.  Now improved. Seen by PT and recommended KYMBERLY.   He will be discharged today.     for physical exam please see progress note from 10/9  LABS:                        8.2    8.85  )-----------( 216      ( 09 Oct 2023 09:49 )             24.4     10-09    141  |  109<H>  |  18  ----------------------------<  124<H>  3.4<L>   |  30  |  0.49<L>    Ca    7.8<L>      09 Oct 2023 07:40    CXR/ Xray Femur 2 Views, Right (10.04.23 @ 23:18) >  Impression:  No acute pulmonary disease.  There is a comminuted, displaced right intertrochanteric fracture.   Moderate adjacent soft tissue swelling.    FINAL DIAGNOSIS:  #S/p mechanical fall with RT hip fracture- s/p IMN POD#4  #Dehydration  #Acute blood loss anemia  -d/t fracture/surgery  -s/p  1 unit prbcs   #Mild leukocytosis-REACTIVE/RESOLVED  #Pre-diabetes  #H/o stroke  #CAD s/p CABG  #LT thyroid nodule-THYROID sono negative  #Probable dementia with metabolic encephalopathy    time taken for dc 46 min  summary to be faxed to pcp.

## 2023-10-05 NOTE — PROGRESS NOTE ADULT - ASSESSMENT
S/P IM nail of Rt hip intertrochanteric Fx    Plan:  PT/WBAT RLE with walker.  NV and Compartemnts check RUE  Ice Rt hip  F/U labs  Pain managements  DVT prophylaxis  Incentive spirometry.  May discharge to rehab tomorrow ar after when stable and cleared by PT and medicine and follow as outpatient

## 2023-10-05 NOTE — DISCHARGE NOTE PROVIDER - NSDCMRMEDTOKEN_GEN_ALL_CORE_FT
Artificial Tears ophthalmic solution: 1 drop(s) in each eye  aspirin 81 mg oral delayed release tablet: 1 tab(s) orally once a day  atorvastatin 80 mg oral tablet: 1 tab(s) orally once a day (at bedtime)  B-12 1000 mcg oral tablet: 1 tab(s) orally once a day  lidocaine 4% topical film: Apply topically to affected area once a day as needed for  moderate pain  metoprolol succinate 25 mg oral tablet, extended release: 1 tab(s) orally once a day  Multiple Vitamins with Minerals oral liquid: 15 milliliter(s) orally once a day  sertraline 50 mg oral tablet: 1 tab(s) orally 2 times a day  thiamine 100 mg oral tablet: 1 tab(s) orally once a day  traMADol 50 mg oral tablet: 0.5 tab(s) orally every 6 hours As needed Moderate Pain (4 - 6)   acetaminophen 500 mg oral tablet: 2 tab(s) orally every 8 hours as needed for  mild pain  Artificial Tears ophthalmic solution: 1 drop(s) in each eye  aspirin 81 mg oral delayed release tablet: 1 tab(s) orally once a day  atorvastatin 80 mg oral tablet: 1 tab(s) orally once a day (at bedtime)  B-12 1000 mcg oral tablet: 1 tab(s) orally once a day  enoxaparin: 40 milligram(s) subcutaneous once a day last day november3rd to complete 4 weeks  ferrous sulfate 325 mg (65 mg elemental iron) oral tablet: 1 tab(s) orally 2 times a day  lidocaine 4% topical film: Apply topically to affected area once a day as needed for  moderate pain  metoprolol succinate 25 mg oral tablet, extended release: 1 tab(s) orally once a day  Multiple Vitamins with Minerals oral liquid: 15 milliliter(s) orally once a day  polyethylene glycol 3350 oral powder for reconstitution: 17 gram(s) orally once a day (at bedtime)  senna leaf extract oral tablet: 2 tab(s) orally once a day (at bedtime)  sertraline 50 mg oral tablet: 1 tab(s) orally 2 times a day  tamsulosin 0.4 mg oral capsule: 1 cap(s) orally once a day (at bedtime)  thiamine 100 mg oral tablet: 1 tab(s) orally once a day  traMADol 50 mg oral tablet: 1 tab(s) orally every 6 hours as needed for Moderate Pain (4 - 6)  Vitamin C 500 mg oral capsule: 1 cap(s) orally 2 times a day

## 2023-10-06 LAB
ANION GAP SERPL CALC-SCNC: 5 MMOL/L — SIGNIFICANT CHANGE UP (ref 5–17)
BUN SERPL-MCNC: 16 MG/DL — SIGNIFICANT CHANGE UP (ref 7–23)
CALCIUM SERPL-MCNC: 7.8 MG/DL — LOW (ref 8.5–10.1)
CHLORIDE SERPL-SCNC: 107 MMOL/L — SIGNIFICANT CHANGE UP (ref 96–108)
CO2 SERPL-SCNC: 28 MMOL/L — SIGNIFICANT CHANGE UP (ref 22–31)
CREAT SERPL-MCNC: 0.62 MG/DL — SIGNIFICANT CHANGE UP (ref 0.5–1.3)
EGFR: 91 ML/MIN/1.73M2 — SIGNIFICANT CHANGE UP
GLUCOSE BLDC GLUCOMTR-MCNC: 161 MG/DL — HIGH (ref 70–99)
GLUCOSE SERPL-MCNC: 166 MG/DL — HIGH (ref 70–99)
HCT VFR BLD CALC: 24.3 % — LOW (ref 39–50)
HGB BLD-MCNC: 8.9 G/DL — LOW (ref 13–17)
MCHC RBC-ENTMCNC: 36.3 PG — HIGH (ref 27–34)
MCHC RBC-ENTMCNC: 36.6 GM/DL — HIGH (ref 32–36)
MCV RBC AUTO: 99.2 FL — SIGNIFICANT CHANGE UP (ref 80–100)
PLATELET # BLD AUTO: 145 K/UL — LOW (ref 150–400)
POTASSIUM SERPL-MCNC: 3.8 MMOL/L — SIGNIFICANT CHANGE UP (ref 3.5–5.3)
POTASSIUM SERPL-SCNC: 3.8 MMOL/L — SIGNIFICANT CHANGE UP (ref 3.5–5.3)
RBC # BLD: 2.45 M/UL — LOW (ref 4.2–5.8)
RBC # FLD: 14.1 % — SIGNIFICANT CHANGE UP (ref 10.3–14.5)
SODIUM SERPL-SCNC: 140 MMOL/L — SIGNIFICANT CHANGE UP (ref 135–145)
WBC # BLD: 11.09 K/UL — HIGH (ref 3.8–10.5)
WBC # FLD AUTO: 11.09 K/UL — HIGH (ref 3.8–10.5)

## 2023-10-06 PROCEDURE — 99232 SBSQ HOSP IP/OBS MODERATE 35: CPT

## 2023-10-06 RX ORDER — ACETAMINOPHEN 500 MG
975 TABLET ORAL EVERY 8 HOURS
Refills: 0 | Status: DISCONTINUED | OUTPATIENT
Start: 2023-10-06 | End: 2023-10-09

## 2023-10-06 RX ORDER — METOPROLOL TARTRATE 50 MG
5 TABLET ORAL ONCE
Refills: 0 | Status: COMPLETED | OUTPATIENT
Start: 2023-10-06 | End: 2023-10-06

## 2023-10-06 RX ORDER — OXYCODONE HYDROCHLORIDE 5 MG/1
2.5 TABLET ORAL EVERY 4 HOURS
Refills: 0 | Status: DISCONTINUED | OUTPATIENT
Start: 2023-10-06 | End: 2023-10-09

## 2023-10-06 RX ORDER — OXYCODONE HYDROCHLORIDE 5 MG/1
5 TABLET ORAL EVERY 4 HOURS
Refills: 0 | Status: DISCONTINUED | OUTPATIENT
Start: 2023-10-06 | End: 2023-10-09

## 2023-10-06 RX ORDER — SODIUM CHLORIDE 9 MG/ML
1000 INJECTION, SOLUTION INTRAVENOUS ONCE
Refills: 0 | Status: COMPLETED | OUTPATIENT
Start: 2023-10-06 | End: 2023-10-06

## 2023-10-06 RX ORDER — METOPROLOL TARTRATE 50 MG
5 TABLET ORAL ONCE
Refills: 0 | Status: DISCONTINUED | OUTPATIENT
Start: 2023-10-06 | End: 2023-10-06

## 2023-10-06 RX ORDER — QUETIAPINE FUMARATE 200 MG/1
12.5 TABLET, FILM COATED ORAL EVERY 6 HOURS
Refills: 0 | Status: DISCONTINUED | OUTPATIENT
Start: 2023-10-06 | End: 2023-10-09

## 2023-10-06 RX ADMIN — Medication 2000 MILLIGRAM(S): at 03:26

## 2023-10-06 RX ADMIN — Medication 2 DROP(S): at 22:08

## 2023-10-06 RX ADMIN — SENNA PLUS 2 TABLET(S): 8.6 TABLET ORAL at 21:04

## 2023-10-06 RX ADMIN — OXYCODONE HYDROCHLORIDE 5 MILLIGRAM(S): 5 TABLET ORAL at 21:04

## 2023-10-06 RX ADMIN — ENOXAPARIN SODIUM 40 MILLIGRAM(S): 100 INJECTION SUBCUTANEOUS at 04:54

## 2023-10-06 RX ADMIN — Medication 2 MILLIGRAM(S): at 03:57

## 2023-10-06 RX ADMIN — SERTRALINE 50 MILLIGRAM(S): 25 TABLET, FILM COATED ORAL at 21:04

## 2023-10-06 RX ADMIN — Medication 2000 MILLIGRAM(S): at 13:50

## 2023-10-06 RX ADMIN — Medication 975 MILLIGRAM(S): at 21:05

## 2023-10-06 RX ADMIN — Medication 25 MILLIGRAM(S): at 13:49

## 2023-10-06 RX ADMIN — ATORVASTATIN CALCIUM 80 MILLIGRAM(S): 80 TABLET, FILM COATED ORAL at 21:05

## 2023-10-06 RX ADMIN — MORPHINE SULFATE 2 MILLIGRAM(S): 50 CAPSULE, EXTENDED RELEASE ORAL at 03:26

## 2023-10-06 RX ADMIN — OXYCODONE HYDROCHLORIDE 5 MILLIGRAM(S): 5 TABLET ORAL at 21:34

## 2023-10-06 RX ADMIN — MORPHINE SULFATE 2 MILLIGRAM(S): 50 CAPSULE, EXTENDED RELEASE ORAL at 03:56

## 2023-10-06 RX ADMIN — Medication 975 MILLIGRAM(S): at 21:04

## 2023-10-06 RX ADMIN — SODIUM CHLORIDE 1000 MILLILITER(S): 9 INJECTION, SOLUTION INTRAVENOUS at 04:24

## 2023-10-06 NOTE — CHART NOTE - NSCHARTNOTEFT_GEN_A_CORE
HOSPITALIST ADDENDUM:  Pt seen and examined earlier today. Chart/labs reviewed. Denies cp/sob. Had just gotten pain meds for pain at fracture site.       89M, PMH Of CAD/CABG X3, with Dr. Vital, CAD, hyperlipidemia, heart murmur, osteoarthritis, recent admission in August 2023 for fall attributed to dehydration, memory loss who presents to ED status post witnessed fall at home while going up 3 steps.  Patient states he was going up and trying to lean forward to go up the next step when he fell backward.  Patient states he did not lose consciousness.   He is admitted with a right hip fracture.   Seen by cardio/underwent 2Decho as below.      Summary     The mitral valve leaflets appear thickened.   Mild mitral annular calcification is present.   Mild (1+) mitral regurgitation is present.   Normal appearing tricuspid valve structure.   Mild (1+) tricuspid valve regurgitation is present.   The aortic valve is well visualized, appears moderately sclerotic. Valve   opening seems to be restricted.   Trace to mild aortic regurgitation is present.   Fibrocalcific changes noted to the pulmonic valve leaflets with   restriction in leaflet excursion. Elevated transpulmonic gradient and   velocities noted. mild pulmonic stenosis   Mild to moderate eccentric pulmonic valvular regurgitation is present.   The left ventricle is normal in size, wall thickness, wall motion and   contractility.   Estimated left ventricular ejection fraction is 60-65 %.   Normal appearing right ventricle structure and function.    NO MEDICAL CONTRAINDICATIONS FOR OR AT THIS TIME.   RCRI class class III risk.     Rest of plan as outlined in H+P
Called to assess pt due to tachycardia to 130's, sustained sinus tach on monitor  Evaluated pt at bedside with RN, pt pleasently confused, anxious  Pt redirectable however diaphoretic and tremulous  Pt /79 's  metoprolol originally given however d/c'd as pt exhibiting s/s delerium rather than cardiac etiology  1mg Ativan given with good response,  and pt calm and cooperative, /68  1L LR bolus being given with additional 1mg ativan  Pt placed in hallway at around 2300 for enhanced observation due to confusion which increased prompting evaluation  Will cont enhanced observation s/p ativan and complete bolus

## 2023-10-06 NOTE — PROGRESS NOTE ADULT - SUBJECTIVE AND OBJECTIVE BOX
Patient seen and examined at bedside. Pain is controlled. Pt with some confusion overnight and removed dressing, dressings replaced.    Vital Signs Last 24 Hrs  T(C): 36.4 (10-06-23 @ 04:03), Max: 37.2 (10-05-23 @ 13:31)  T(F): 97.5 (10-06-23 @ 04:03), Max: 99 (10-05-23 @ 13:31)  HR: 102 (10-06-23 @ 04:14) (75 - 131)  BP: 117/60 (10-06-23 @ 04:14) (115/68 - 158/73)  BP(mean): 70 (10-05-23 @ 13:31) (70 - 70)  RR: 18 (10-06-23 @ 04:14) (14 - 18)  SpO2: 99% (10-06-23 @ 04:14) (96% - 100%)      PT/INR - ( 05 Oct 2023 07:11 )   PT: 11.3 sec;   INR: 1.00 ratio         PTT - ( 05 Oct 2023 07:11 )  PTT:34.3 sec    Exam:  Gen: NAD, resting comfortably  RLE  Dressing c/d/i  +EHL/FHL/TA/GS  SILT dp/sp/saph/sural/tibial  +DP  Calf NTTP b/l  Compartments soft and compressible    A/P:  89yMale Stable POD1 R IMN    -FU labs  -WBAT  -Pain control  -PT/OT  -Ppx ABX  -DVT PE ppx- per primary team, lovenox 40 qday  -med management per primary team  -Incentive spirometry  -Dispo pending PT  -stable for discharge from ortho standpoint fu in office; call for appointment

## 2023-10-06 NOTE — PROGRESS NOTE ADULT - PROBLEM SELECTOR PLAN 2
Problem: CAD, multiple vessel.   ·  Recommendation: Chronic, stable CAD with no baseline angina; continue medical management as above.

## 2023-10-06 NOTE — PROVIDER CONTACT NOTE (OTHER) - ACTION/TREATMENT ORDERED:
assessed pt at bedside with . 2mg IV ativan given, 2mg morphine given and 1L bolus given. Pt is in hallway d/t confusion and monitoring. Pt HR now 101 and relaxed.

## 2023-10-06 NOTE — PROGRESS NOTE ADULT - PROBLEM SELECTOR PLAN 3
Problem: Abnormal ECG.   ·  Recommendation: Similar to 8/8/23 tracing.    Will sign off.  Please call with any questions.

## 2023-10-06 NOTE — PROGRESS NOTE ADULT - SUBJECTIVE AND OBJECTIVE BOX
CC- RT hip fracture (06 Oct 2023 11:49)      HPI:  Patient is a 89-year-old male with a past medical history of triple-vessel bypass in 2003 with Dr. Vital, CAD, hyperlipidemia, heart murmur, osteoarthritis, recent admission in August 2023 for fall attributed to dehydration, memory loss who presents to ED status post witnessed fall at home while going up 3 steps.  Patient states he was going up and trying to lean forward to go up the next step when he fell backward.  Patient states he did not lose consciousness.  Patient states he did not hit his head however this is questionable as patient was filmed by family member and may have hit head on chair slightly.  Patient denies this.  Patient states he had no symptoms prior to fall and that he simply lost his stepping.  Patient states after fall he was unable to get himself up due to pain.  Family states they also tried to help him up however were unable as patient was in pain prompting his visit to [facility] ED.    10/6/23- pt is sleeping and barely arousable. Received Morphine 2mg+4mg and Ativan 2mg IV last night    Review of system- All 10 systems reviewed and is as per HPI otherwise negative.     T(C): 37 (10-06-23 @ 08:00), Max: 37.2 (10-05-23 @ 13:31)  HR: 82 (10-06-23 @ 10:36) (75 - 131)  BP: 110/74 (10-06-23 @ 08:00) (110/74 - 158/73)  RR: 18 (10-06-23 @ 08:00) (14 - 18)  SpO2: 100% (10-06-23 @ 08:00) (96% - 100%)  Wt(kg): --    LABS:                        8.9    11.09 )-----------( 145      ( 06 Oct 2023 06:31 )             24.3     10-06    140  |  107  |  16  ----------------------------<  166<H>  3.8   |  28  |  0.62    Ca    7.8<L>      06 Oct 2023 06:31  Phos  3.2     10-05  Mg     1.8     10-05    TPro  6.1  /  Alb  3.1<L>  /  TBili  0.6  /  DBili  x   /  AST  19  /  ALT  18  /  AlkPhos  63  10-05    PT/INR - ( 05 Oct 2023 07:11 )   PT: 11.3 sec;   INR: 1.00 ratio       PTT - ( 05 Oct 2023 07:11 )  PTT:34.3 sec    Urinalysis Basic - ( 06 Oct 2023 06:31 )  Color: x / Appearance: x / SG: x / pH: x  Gluc: 166 mg/dL / Ketone: x  / Bili: x / Urobili: x   Blood: x / Protein: x / Nitrite: x   Leuk Esterase: x / RBC: x / WBC x   Sq Epi: x / Non Sq Epi: x / Bacteria: x    CAPILLARY BLOOD GLUCOSE  POCT Blood Glucose.: 161 mg/dL (06 Oct 2023 03:59)      RADIOLOGY & ADDITIONAL TESTS:      PHYSICAL EXAM:  GENERAL: NAD  HEAD:  Atraumatic, Normocephalic  EYES: EOMI, PERRLA, conjunctiva and sclera clear  HEENT: Moist mucous membranes  NECK: Supple, No JVD  NERVOUS SYSTEM: Lethargic  CHEST/LUNG: Clear to auscultation bilaterally; No rales, rhonchi, wheezing, or rubs  HEART: Regular rate and rhythm; No murmurs, rubs, or gallops  ABDOMEN: Soft, Nontender, Nondistended; Bowel sounds present  GENITOURINARY- Voiding, no palpable bladder  EXTREMITIES:  2+ Peripheral Pulses, No clubbing, cyanosis, or edema  MUSCULOSKELTAL- RT hip dressing dry  SKIN-no rash, no lesion    MEDICATIONS  (STANDING):  aspirin enteric coated 81 milliGRAM(s) Oral daily  atorvastatin 80 milliGRAM(s) Oral at bedtime  ceFAZolin  Injectable. 2000 milliGRAM(s) IV Push every 8 hours  cyanocobalamin 1000 MICROGram(s) Oral daily  enoxaparin Injectable 40 milliGRAM(s) SubCutaneous every 24 hours  lidocaine   4% Patch 1 Patch Transdermal daily  metoprolol succinate ER 25 milliGRAM(s) Oral daily  multivitamin/minerals/iron Oral Solution (CENTRUM) 15 milliLiter(s) Oral daily  senna 2 Tablet(s) Oral at bedtime  sertraline 50 milliGRAM(s) Oral two times a day  sodium chloride 0.9%. 1000 milliLiter(s) (50 mL/Hr) IV Continuous <Continuous>  sodium chloride 0.9%. 1000 milliLiter(s) (80 mL/Hr) IV Continuous <Continuous>  thiamine 100 milliGRAM(s) Oral daily    MEDICATIONS  (PRN):  acetaminophen     Tablet .. 650 milliGRAM(s) Oral every 6 hours PRN Mild Pain (1 - 3)  artificial tears (preservative free) Ophthalmic Solution 2 Drop(s) Both EYES four times a day PRN Dry Eyes  morphine  - Injectable 4 milliGRAM(s) IV Push every 6 hours PRN Severe Pain (7 - 10)  morphine  - Injectable 2 milliGRAM(s) IV Push every 4 hours PRN Moderate Pain (4 - 6)  ondansetron Injectable 4 milliGRAM(s) IV Push every 6 hours PRN Nausea and/or Vomiting  oxyCODONE    IR 5 milliGRAM(s) Oral every 4 hours PRN Mild Pain (1 - 3)    Assessment/Plan  #S/p mechanical fall with RT hip fracture- s/p IMN  #Anemia acute blood loss from fracture/ postop  Ortho following  PT as tolerated  Pain meds prn  Monitor HH, no need for transfusion today but trending down  Incentive spirometry  Bowel regimen    #Mild leukocytosis  Likely reactive  UA not consistent with UTI    #No evidence of WILL  Cr within normal parameters    #Pre-diabetes  Outpatient f/u    #H/o stroke  #CAD s/p CABG  On Aspirin , statin    #LT thyroid nodule- outpatient f/u    #Likely dementia  Seroquel prn    #DVt proph- Lovenox x4 weeks    #Dispo- likely rehab on discharge

## 2023-10-06 NOTE — PROGRESS NOTE ADULT - PROBLEM/PLAN-1
DISPLAY PLAN FREE TEXT stat bmp. Provider notified and aware. Notify provider when patient returns to unit from dialysis. Continue to monitor. stat bmp. stat bmp.

## 2023-10-06 NOTE — PROGRESS NOTE ADULT - NS ATTEND AMEND GEN_ALL_CORE FT
Agree with the above. Sinus tachycardia on tele, not cardiac in etiology.   Will sign off. Please call back with any questions/concerns.

## 2023-10-06 NOTE — PROGRESS NOTE ADULT - PROBLEM SELECTOR PLAN 1
·  Problem: Preoperative cardiovascular examination.   ·  Recommendation: Pt with h/o CVA (lacunar), CAD, s/p IM nail of right hip intertrochanteric fx.  Tolerated procedure.  No cardiac complaints.  Pt with episode sinus tachycardia last night, felt to be due to confusion not cardiac.  Received IV ativan with good results.  Tele shows Sinus rhythm.  Echo shows normal LVF, mild-moderate valvular disease. ECG (abnormal but similar to recent tracing).  Continue asa, statin, BB

## 2023-10-06 NOTE — PROGRESS NOTE ADULT - SUBJECTIVE AND OBJECTIVE BOX
CHIEF COMPLAINT: "My leg hurts when I try to move."    HPI:  89-year-old man with a history of severe CAD s/p remote CABG, HTN, HLD, ? CVA, memory loss / dementia, who was brought to the ED following a fall.  Mr Tafoya is unable to describe details of events prior to arrival to ED - describes a fall but mechanism unclear.  He was found to have a R femoral fracture.  He only has orthopedic complaints this AM (pain in R leg w/ movement); no dyspnea, angina, palpitations, syncope.    10/6/23: Pt sleeping, daughter at bedside. States pt has been resting comfortably without complaints. S/P IM nail of right hip intertrochanteric Fx 10/5/23.  Received IV ativan overnight for increased confusion and sinus tachycardia 's.  Presently HR controlled on tele. Pt in sinus rhythm    PAST MEDICAL & SURGICAL HISTORY:  Depression  HTN (hypertension)  HLD (hyperlipidemia)  CAD (coronary artery disease)  S/P CABG (coronary artery bypass graft)  CVA (cerebrovascular accident)    SOCIAL HISTORY:   Alcohol: Denied  Smoking: Nonsmoker    FAMILY HISTORY: Details not known to patient    MEDICATIONS  (STANDING):  aspirin enteric coated 81 milliGRAM(s) Oral daily  atorvastatin 80 milliGRAM(s) Oral at bedtime  ceFAZolin  Injectable. 2000 milliGRAM(s) IV Push every 8 hours  cyanocobalamin 1000 MICROGram(s) Oral daily  enoxaparin Injectable 40 milliGRAM(s) SubCutaneous every 24 hours  lidocaine   4% Patch 1 Patch Transdermal daily  metoprolol succinate ER 25 milliGRAM(s) Oral daily  multivitamin/minerals/iron Oral Solution (CENTRUM) 15 milliLiter(s) Oral daily  senna 2 Tablet(s) Oral at bedtime  sertraline 50 milliGRAM(s) Oral two times a day  sodium chloride 0.9%. 1000 milliLiter(s) (50 mL/Hr) IV Continuous <Continuous>  sodium chloride 0.9%. 1000 milliLiter(s) (80 mL/Hr) IV Continuous <Continuous>  thiamine 100 milliGRAM(s) Oral daily    MEDICATIONS  (PRN):  acetaminophen     Tablet .. 650 milliGRAM(s) Oral every 6 hours PRN Mild Pain (1 - 3)  artificial tears (preservative free) Ophthalmic Solution 2 Drop(s) Both EYES four times a day PRN Dry Eyes  morphine  - Injectable 2 milliGRAM(s) IV Push every 4 hours PRN Moderate Pain (4 - 6)  morphine  - Injectable 4 milliGRAM(s) IV Push every 6 hours PRN Severe Pain (7 - 10)  ondansetron Injectable 4 milliGRAM(s) IV Push every 6 hours PRN Nausea and/or Vomiting  oxyCODONE    IR 5 milliGRAM(s) Oral every 4 hours PRN Mild Pain (1 - 3)      Allergies: No Known Allergies      Vital Signs Last 24 Hrs  T(C): 37 (06 Oct 2023 08:00), Max: 37.2 (05 Oct 2023 13:31)  T(F): 98.6 (06 Oct 2023 08:00), Max: 99 (05 Oct 2023 13:31)  HR: 82 (06 Oct 2023 10:36) (75 - 131)  BP: 110/74 (06 Oct 2023 08:00) (110/74 - 158/73)  BP(mean): 70 (05 Oct 2023 13:31) (70 - 70)  RR: 18 (06 Oct 2023 08:00) (14 - 18)  SpO2: 100% (06 Oct 2023 08:00) (96% - 100%)    Parameters below as of 06 Oct 2023 08:00  Patient On (Oxygen Delivery Method): nasal cannula  O2 Flow (L/min): 2    PHYSICAL EXAM:  Constitutional: NAD, sleeping comfortably.  HEENT:  Pupils round, No oral cyanosis.  Pulmonary: Non-labored, breath sounds are clear bilaterally, No wheezing, rales or rhonchi  Cardiovascular: S1 and S2, regular rate and rhythm, no Murmurs, gallops or rubs  Gastrointestinal: Bowel Sounds present, soft, nontender.   Lymph: No peripheral edema.   Neurological: Alert, no focal deficits  Skin: No rashes.  Psych:  Mood & affect appropriate    LABS:                             8.9    11.09 )-----------( 145      ( 06 Oct 2023 06:31 )             24.3                11.3   10.40 )-----------( 184      ( 05 Oct 2023 07:11 )             33.5           143    |  111    |  22     ----------------------------<  140    4.0     |  30     |  0.70     TPro  6.1    /  Alb  3.1    /  TBili  0.6    /  DBili  x      /  AST  19     /  ALT  18     /  AlkPhos  63     05 Oct 2023 07:11    PT/INR - ( 05 Oct 2023 07:11 )   PT: 11.3 sec;   INR: 1.00 ratio    PTT - ( 05 Oct 2023 07:11 )  PTT:34.3 sec    TSH: 0.74    ECG: Sinus rhythm [id number] degree A-V block  Left anterior fascicular block  Non-specific intra-ventricular conduction delay  Abnormal ECG    < from: TTE Echo Complete w/o Contrast w/ Doppler (10.05.23 @ 07:45) >   Impression     Summary     The mitral valve leaflets appear thickened.   Mild mitral annular calcification is present.   Mild (1+) mitral regurgitation is present.   Normal appearing tricuspid valve structure.   Mild (1+) tricuspid valve regurgitation is present.   The aortic valve is well visualized, appears moderately sclerotic. Valve   opening seems to be restricted.   Trace to mild aortic regurgitation is present.   Fibrocalcific changes noted to the pulmonic valve leaflets with   restriction in leaflet excursion. Elevated transpulmonic gradient and   velocities noted. mild pulmonic stenosis   Mild to moderate eccentric pulmonic valvular regurgitation is present.   The left ventricle is normal in size, wall thickness, wall motion and   contractility.   Estimated left ventricular ejection fraction is 60-65 %.   Normal appearing right ventricle structure and function.      < end of copied text >

## 2023-10-07 LAB
ANION GAP SERPL CALC-SCNC: 2 MMOL/L — LOW (ref 5–17)
BUN SERPL-MCNC: 15 MG/DL — SIGNIFICANT CHANGE UP (ref 7–23)
CALCIUM SERPL-MCNC: 8.1 MG/DL — LOW (ref 8.5–10.1)
CHLORIDE SERPL-SCNC: 107 MMOL/L — SIGNIFICANT CHANGE UP (ref 96–108)
CO2 SERPL-SCNC: 32 MMOL/L — HIGH (ref 22–31)
CREAT SERPL-MCNC: 0.56 MG/DL — SIGNIFICANT CHANGE UP (ref 0.5–1.3)
EGFR: 94 ML/MIN/1.73M2 — SIGNIFICANT CHANGE UP
GLUCOSE SERPL-MCNC: 127 MG/DL — HIGH (ref 70–99)
HCT VFR BLD CALC: 22.6 % — LOW (ref 39–50)
HGB BLD-MCNC: 8.2 G/DL — LOW (ref 13–17)
MCHC RBC-ENTMCNC: 36.3 GM/DL — HIGH (ref 32–36)
MCHC RBC-ENTMCNC: 36.6 PG — HIGH (ref 27–34)
MCV RBC AUTO: 100.9 FL — HIGH (ref 80–100)
PLATELET # BLD AUTO: 143 K/UL — LOW (ref 150–400)
POTASSIUM SERPL-MCNC: 3.6 MMOL/L — SIGNIFICANT CHANGE UP (ref 3.5–5.3)
POTASSIUM SERPL-SCNC: 3.6 MMOL/L — SIGNIFICANT CHANGE UP (ref 3.5–5.3)
RBC # BLD: 2.24 M/UL — LOW (ref 4.2–5.8)
RBC # FLD: 14.1 % — SIGNIFICANT CHANGE UP (ref 10.3–14.5)
SODIUM SERPL-SCNC: 141 MMOL/L — SIGNIFICANT CHANGE UP (ref 135–145)
WBC # BLD: 9.27 K/UL — SIGNIFICANT CHANGE UP (ref 3.8–10.5)
WBC # FLD AUTO: 9.27 K/UL — SIGNIFICANT CHANGE UP (ref 3.8–10.5)

## 2023-10-07 PROCEDURE — 99232 SBSQ HOSP IP/OBS MODERATE 35: CPT

## 2023-10-07 RX ORDER — POLYETHYLENE GLYCOL 3350 17 G/17G
17 POWDER, FOR SOLUTION ORAL AT BEDTIME
Refills: 0 | Status: DISCONTINUED | OUTPATIENT
Start: 2023-10-07 | End: 2023-10-09

## 2023-10-07 RX ORDER — TAMSULOSIN HYDROCHLORIDE 0.4 MG/1
0.4 CAPSULE ORAL AT BEDTIME
Refills: 0 | Status: DISCONTINUED | OUTPATIENT
Start: 2023-10-07 | End: 2023-10-09

## 2023-10-07 RX ADMIN — Medication 975 MILLIGRAM(S): at 21:12

## 2023-10-07 RX ADMIN — OXYCODONE HYDROCHLORIDE 5 MILLIGRAM(S): 5 TABLET ORAL at 06:22

## 2023-10-07 RX ADMIN — SERTRALINE 50 MILLIGRAM(S): 25 TABLET, FILM COATED ORAL at 21:13

## 2023-10-07 RX ADMIN — OXYCODONE HYDROCHLORIDE 5 MILLIGRAM(S): 5 TABLET ORAL at 21:12

## 2023-10-07 RX ADMIN — Medication 975 MILLIGRAM(S): at 13:25

## 2023-10-07 RX ADMIN — Medication 25 MILLIGRAM(S): at 10:25

## 2023-10-07 RX ADMIN — ATORVASTATIN CALCIUM 80 MILLIGRAM(S): 80 TABLET, FILM COATED ORAL at 21:12

## 2023-10-07 RX ADMIN — SERTRALINE 50 MILLIGRAM(S): 25 TABLET, FILM COATED ORAL at 10:25

## 2023-10-07 RX ADMIN — OXYCODONE HYDROCHLORIDE 5 MILLIGRAM(S): 5 TABLET ORAL at 06:52

## 2023-10-07 RX ADMIN — POLYETHYLENE GLYCOL 3350 17 GRAM(S): 17 POWDER, FOR SOLUTION ORAL at 21:12

## 2023-10-07 RX ADMIN — Medication 15 MILLILITER(S): at 10:29

## 2023-10-07 RX ADMIN — SENNA PLUS 2 TABLET(S): 8.6 TABLET ORAL at 21:12

## 2023-10-07 RX ADMIN — ENOXAPARIN SODIUM 40 MILLIGRAM(S): 100 INJECTION SUBCUTANEOUS at 05:38

## 2023-10-07 RX ADMIN — TAMSULOSIN HYDROCHLORIDE 0.4 MILLIGRAM(S): 0.4 CAPSULE ORAL at 21:12

## 2023-10-07 RX ADMIN — Medication 100 MILLIGRAM(S): at 10:27

## 2023-10-07 RX ADMIN — Medication 975 MILLIGRAM(S): at 05:52

## 2023-10-07 RX ADMIN — PREGABALIN 1000 MICROGRAM(S): 225 CAPSULE ORAL at 10:28

## 2023-10-07 RX ADMIN — Medication 975 MILLIGRAM(S): at 05:39

## 2023-10-07 RX ADMIN — Medication 81 MILLIGRAM(S): at 10:25

## 2023-10-07 NOTE — PHYSICAL THERAPY INITIAL EVALUATION ADULT - ADDITIONAL COMMENTS
pt lives in a 1 story [place] with his daughter.  + 3 KELECHI with HR.  pt was independent with functional mobility prior to fall.

## 2023-10-07 NOTE — PHYSICAL THERAPY INITIAL EVALUATION ADULT - GENERAL OBSERVATIONS, REHAB EVAL
pt received in bed on 2N. pt with some confusion but pleasant and cooperative with PT.  no c/o pain at rest, c/o 10/10 pain with movement.  pt is to received PRBC today.  post session pt left in bed in care of CNA.  RN informed of pts status/performance.

## 2023-10-07 NOTE — PHYSICAL THERAPY INITIAL EVALUATION ADULT - RANGE OF MOTION EXAMINATION, REHAB EVAL
R hip limited by pain/reactivity/bilateral upper extremity ROM was WFL (within functional limits)/Left LE ROM was WFL (within functional limits)/deficits as listed below

## 2023-10-07 NOTE — PROGRESS NOTE ADULT - SUBJECTIVE AND OBJECTIVE BOX
CC- RT hip fracture    HPI:89M, PMH of CAD/ triple-vessel bypass in 2003 with Dr. Vital, CAD, hyperlipidemia, heart murmur, osteoarthritis, recent admission in August 2023 for fall attributed to dehydration, memory loss who presents to ED status post witnessed fall at home while going up 3 steps.  Patient states he was going up and trying to lean forward to go up the next step when he fell backward.  Patient states he did not lose consciousness. He is admitted with a right hip fracture. Underwent IM nail 10/5.     post op course notable for confusion/agitation requiring iv ativan.     pt seen and examined this am. doing ok. Tolerating po. Moderate amt of pain at surgical site. couldn't recall if he had gotten out of bed today.     Review of system- All 10 systems reviewed and is as per HPI otherwise negative.       Vital Signs Last 24 Hrs  T(C): 36.8 (07 Oct 2023 08:16), Max: 36.8 (07 Oct 2023 08:16)  T(F): 98.2 (07 Oct 2023 08:16), Max: 98.2 (07 Oct 2023 08:16)  HR: 94 (07 Oct 2023 08:16) (85 - 94)  BP: 126/67 (07 Oct 2023 08:16) (112/66 - 126/67)  RR: 16 (07 Oct 2023 08:16) (16 - 18)  SpO2: 99% (07 Oct 2023 08:16) (95% - 99%)    Parameters below as of 07 Oct 2023 08:16  Patient On (Oxygen Delivery Method): nasal cannula  O2 Flow (L/min): 2        PHYSICAL EXAM:    GENERAL: Comfortable, no acute distress   HEAD:  Normocephalic, atraumatic  EYES: EOMI, PERRLA  HEENT: Moist mucous membranes  NECK: Supple, No JVD  NERVOUS SYSTEM:  Alert & Oriented X1, grossly non focal.   CHEST/LUNG: Clear to auscultation bilaterally  HEART: Regular rate and rhythm  ABDOMEN: Soft, Nontender, Nondistended, Bowel sounds present  GENITOURINARY: Voiding, no palpable bladder  EXTREMITIES:   No clubbing, cyanosis, or edema  MUSCULOSKELETAL- Right hip dressing intact.   SKIN-no rash    LABS:                        8.2    9.27  )-----------( 143      ( 07 Oct 2023 06:21 )             22.6     10-07    141  |  107  |  15  ----------------------------<  127<H>  3.6   |  32<H>  |  0.56    Ca    8.1<L>      07 Oct 2023 06:21    MEDICATIONS  (STANDING):  acetaminophen     Tablet .. 975 milliGRAM(s) Oral every 8 hours  aspirin enteric coated 81 milliGRAM(s) Oral daily  atorvastatin 80 milliGRAM(s) Oral at bedtime  cyanocobalamin 1000 MICROGram(s) Oral daily  enoxaparin Injectable 40 milliGRAM(s) SubCutaneous every 24 hours  lidocaine   4% Patch 1 Patch Transdermal daily  metoprolol succinate ER 25 milliGRAM(s) Oral daily  multivitamin/minerals/iron Oral Solution (CENTRUM) 15 milliLiter(s) Oral daily  senna 2 Tablet(s) Oral at bedtime  sertraline 50 milliGRAM(s) Oral two times a day  thiamine 100 milliGRAM(s) Oral daily    MEDICATIONS  (PRN):  acetaminophen     Tablet .. 650 milliGRAM(s) Oral every 6 hours PRN Mild Pain (1 - 3)  artificial tears (preservative free) Ophthalmic Solution 2 Drop(s) Both EYES four times a day PRN Dry Eyes  LORazepam   Injectable 0.5 milliGRAM(s) IV Push every 6 hours PRN severe agitation/ if not taking PO meds  ondansetron Injectable 4 milliGRAM(s) IV Push every 6 hours PRN Nausea and/or Vomiting  oxyCODONE    IR 2.5 milliGRAM(s) Oral every 4 hours PRN Moderate Pain (4 - 6)  oxyCODONE    IR 5 milliGRAM(s) Oral every 4 hours PRN Severe Pain (7 - 10)  QUEtiapine 12.5 milliGRAM(s) Oral every 6 hours PRN agitation    Assessment/Plan  #S/p mechanical fall with RT hip fracture- s/p IMN POD#2  -pain control-->limit narcotics with confusion.   -physical therapy  -incentive spirometry  -bowel regimen.     #Acute blood loss anemia:  -d/t fracture/surgery  -being transfused by ortho.     #Mild leukocytosis  -Likely reactive  -resolved    #No evidence of WILL  Cr within normal parameters    #Pre-diabetes  Outpatient f/u    #H/o stroke  #CAD s/p CABG  On Aspirin , statin    #LT thyroid nodule  - outpatient f/u    #Probable dementia with metabolic encephalopathy:  Seroquel prn    #DVt proph- Lovenox x4 weeks    #Dispo- likely rehab on discharge

## 2023-10-07 NOTE — PHYSICAL THERAPY INITIAL EVALUATION ADULT - PERTINENT HX OF CURRENT PROBLEM, REHAB EVAL
as per HPI from [facility]-Attending note: 89M, PMH Of CAD/CABG X3, with Dr. Vital, CAD, hyperlipidemia, heart murmur, osteoarthritis, recent admission in August 2023 for fall attributed to dehydration, memory loss who presents to ED status post witnessed fall at home while going up 3 steps.  Patient states he was going up and trying to lean forward to go up the next step when he fell backward.  Patient states he did not lose consciousness.
as per HPI from [facility]-Attending note: 89M, PMH Of CAD/CABG X3, with Dr. Vital, CAD, hyperlipidemia, heart murmur, osteoarthritis, recent admission in August 2023 for fall attributed to dehydration, memory loss who presents to ED status post witnessed fall at home while going up 3 steps.  Patient states he was going up and trying to lean forward to go up the next step when he fell backward.  Patient states he did not lose consciousness.

## 2023-10-07 NOTE — PROGRESS NOTE ADULT - SUBJECTIVE AND OBJECTIVE BOX
Patient seen and examined at bedside this AM. Pain is well controlled at rest. Pt with mild confusion overnight. No acute events overnight.           LABS:                        8.9    11.09 )-----------( 145      ( 06 Oct 2023 06:31 )             24.3     10-06    140  |  107  |  16  ----------------------------<  166<H>  3.8   |  28  |  0.62    Ca    7.8<L>      06 Oct 2023 06:31  Phos  3.2     10-05  Mg     1.8     10-05    TPro  6.1  /  Alb  3.1<L>  /  TBili  0.6  /  DBili  x   /  AST  19  /  ALT  18  /  AlkPhos  63  10-05    PT/INR - ( 05 Oct 2023 07:11 )   PT: 11.3 sec;   INR: 1.00 ratio         PTT - ( 05 Oct 2023 07:11 )  PTT:34.3 sec  Urinalysis Basic - ( 06 Oct 2023 06:31 )    Color: x / Appearance: x / SG: x / pH: x  Gluc: 166 mg/dL / Ketone: x  / Bili: x / Urobili: x   Blood: x / Protein: x / Nitrite: x   Leuk Esterase: x / RBC: x / WBC x   Sq Epi: x / Non Sq Epi: x / Bacteria: x        VITAL SIGNS:  T(C): 36.6 (10-07-23 @ 00:00), Max: 37 (10-06-23 @ 08:00)  HR: 85 (10-07-23 @ 00:00) (82 - 100)  BP: 112/66 (10-07-23 @ 00:00) (110/74 - 134/68)  RR: 18 (10-07-23 @ 00:00) (18 - 18)  SpO2: 99% (10-07-23 @ 00:00) (95% - 100%)        Exam:  Gen: NAD, resting comfortably  RLE  Dressing c/d/i  +EHL/FHL/TA/GS  SILT dp/sp/saph/sural/tibial  +DP  Calf NTTP b/l  Compartments soft and compressible    A/P:  89y Male s/p R IMN, 10/5    -FU labs; will transfusion PRN   -WBAT  -Pain control  -PT/OT  -S/p periop abx ABX  -DVT PE ppx- per primary team, lovenox 40 qday  -med management per primary team  -Incentive spirometry  -Dispo pending PT eval, was lethargic, PT to re eval   -No further acute orthopaedic surgical intervention indicated

## 2023-10-07 NOTE — PHYSICAL THERAPY INITIAL EVALUATION ADULT - DIAGNOSIS, PT EVAL
89 y.o. male s/p IMN of Right Hip Intertrochanteric Fx
89 y.o. male s/p IMN of Right Hip Intertrochanteric Fx

## 2023-10-08 LAB
ANION GAP SERPL CALC-SCNC: 4 MMOL/L — LOW (ref 5–17)
BUN SERPL-MCNC: 17 MG/DL — SIGNIFICANT CHANGE UP (ref 7–23)
CALCIUM SERPL-MCNC: 8.1 MG/DL — LOW (ref 8.5–10.1)
CHLORIDE SERPL-SCNC: 107 MMOL/L — SIGNIFICANT CHANGE UP (ref 96–108)
CO2 SERPL-SCNC: 31 MMOL/L — SIGNIFICANT CHANGE UP (ref 22–31)
CREAT SERPL-MCNC: 0.53 MG/DL — SIGNIFICANT CHANGE UP (ref 0.5–1.3)
EGFR: 95 ML/MIN/1.73M2 — SIGNIFICANT CHANGE UP
GLUCOSE SERPL-MCNC: 131 MG/DL — HIGH (ref 70–99)
HCT VFR BLD CALC: 24.4 % — LOW (ref 39–50)
HCT VFR BLD CALC: 27.9 % — LOW (ref 39–50)
HGB BLD-MCNC: 8.4 G/DL — LOW (ref 13–17)
HGB BLD-MCNC: 9.8 G/DL — LOW (ref 13–17)
MCHC RBC-ENTMCNC: 33.3 PG — SIGNIFICANT CHANGE UP (ref 27–34)
MCHC RBC-ENTMCNC: 34.4 GM/DL — SIGNIFICANT CHANGE UP (ref 32–36)
MCHC RBC-ENTMCNC: 34.8 PG — HIGH (ref 27–34)
MCHC RBC-ENTMCNC: 35.1 GM/DL — SIGNIFICANT CHANGE UP (ref 32–36)
MCV RBC AUTO: 96.8 FL — SIGNIFICANT CHANGE UP (ref 80–100)
MCV RBC AUTO: 98.9 FL — SIGNIFICANT CHANGE UP (ref 80–100)
PLATELET # BLD AUTO: 167 K/UL — SIGNIFICANT CHANGE UP (ref 150–400)
PLATELET # BLD AUTO: 179 K/UL — SIGNIFICANT CHANGE UP (ref 150–400)
POTASSIUM SERPL-MCNC: 3.6 MMOL/L — SIGNIFICANT CHANGE UP (ref 3.5–5.3)
POTASSIUM SERPL-SCNC: 3.6 MMOL/L — SIGNIFICANT CHANGE UP (ref 3.5–5.3)
RBC # BLD: 2.52 M/UL — LOW (ref 4.2–5.8)
RBC # BLD: 2.82 M/UL — LOW (ref 4.2–5.8)
RBC # FLD: 14 % — SIGNIFICANT CHANGE UP (ref 10.3–14.5)
RBC # FLD: 14 % — SIGNIFICANT CHANGE UP (ref 10.3–14.5)
SODIUM SERPL-SCNC: 142 MMOL/L — SIGNIFICANT CHANGE UP (ref 135–145)
WBC # BLD: 8.98 K/UL — SIGNIFICANT CHANGE UP (ref 3.8–10.5)
WBC # BLD: 9.53 K/UL — SIGNIFICANT CHANGE UP (ref 3.8–10.5)
WBC # FLD AUTO: 8.98 K/UL — SIGNIFICANT CHANGE UP (ref 3.8–10.5)
WBC # FLD AUTO: 9.53 K/UL — SIGNIFICANT CHANGE UP (ref 3.8–10.5)

## 2023-10-08 PROCEDURE — 99232 SBSQ HOSP IP/OBS MODERATE 35: CPT

## 2023-10-08 RX ORDER — SODIUM CHLORIDE 9 MG/ML
1000 INJECTION INTRAMUSCULAR; INTRAVENOUS; SUBCUTANEOUS
Refills: 0 | Status: DISCONTINUED | OUTPATIENT
Start: 2023-10-08 | End: 2023-10-09

## 2023-10-08 RX ADMIN — LIDOCAINE 1 PATCH: 4 CREAM TOPICAL at 21:31

## 2023-10-08 RX ADMIN — SERTRALINE 50 MILLIGRAM(S): 25 TABLET, FILM COATED ORAL at 21:53

## 2023-10-08 RX ADMIN — Medication 15 MILLILITER(S): at 09:15

## 2023-10-08 RX ADMIN — ATORVASTATIN CALCIUM 80 MILLIGRAM(S): 80 TABLET, FILM COATED ORAL at 21:54

## 2023-10-08 RX ADMIN — SERTRALINE 50 MILLIGRAM(S): 25 TABLET, FILM COATED ORAL at 09:15

## 2023-10-08 RX ADMIN — Medication 975 MILLIGRAM(S): at 05:44

## 2023-10-08 RX ADMIN — SENNA PLUS 2 TABLET(S): 8.6 TABLET ORAL at 21:53

## 2023-10-08 RX ADMIN — LIDOCAINE 1 PATCH: 4 CREAM TOPICAL at 09:15

## 2023-10-08 RX ADMIN — Medication 25 MILLIGRAM(S): at 09:14

## 2023-10-08 RX ADMIN — SODIUM CHLORIDE 75 MILLILITER(S): 9 INJECTION INTRAMUSCULAR; INTRAVENOUS; SUBCUTANEOUS at 11:15

## 2023-10-08 RX ADMIN — OXYCODONE HYDROCHLORIDE 5 MILLIGRAM(S): 5 TABLET ORAL at 14:15

## 2023-10-08 RX ADMIN — Medication 100 MILLIGRAM(S): at 09:15

## 2023-10-08 RX ADMIN — OXYCODONE HYDROCHLORIDE 5 MILLIGRAM(S): 5 TABLET ORAL at 09:15

## 2023-10-08 RX ADMIN — Medication 975 MILLIGRAM(S): at 13:21

## 2023-10-08 RX ADMIN — Medication 975 MILLIGRAM(S): at 21:54

## 2023-10-08 RX ADMIN — QUETIAPINE FUMARATE 12.5 MILLIGRAM(S): 200 TABLET, FILM COATED ORAL at 23:57

## 2023-10-08 RX ADMIN — PREGABALIN 1000 MICROGRAM(S): 225 CAPSULE ORAL at 09:15

## 2023-10-08 RX ADMIN — TAMSULOSIN HYDROCHLORIDE 0.4 MILLIGRAM(S): 0.4 CAPSULE ORAL at 21:53

## 2023-10-08 RX ADMIN — Medication 975 MILLIGRAM(S): at 22:00

## 2023-10-08 RX ADMIN — LIDOCAINE 1 PATCH: 4 CREAM TOPICAL at 21:32

## 2023-10-08 RX ADMIN — ENOXAPARIN SODIUM 40 MILLIGRAM(S): 100 INJECTION SUBCUTANEOUS at 05:44

## 2023-10-08 RX ADMIN — Medication 81 MILLIGRAM(S): at 09:17

## 2023-10-08 RX ADMIN — OXYCODONE HYDROCHLORIDE 5 MILLIGRAM(S): 5 TABLET ORAL at 13:21

## 2023-10-08 RX ADMIN — OXYCODONE HYDROCHLORIDE 5 MILLIGRAM(S): 5 TABLET ORAL at 10:15

## 2023-10-08 NOTE — PROVIDER CONTACT NOTE (OTHER) - SITUATION
Message left on service voicemail for non-urgent consult for pre-op cardiac clearance
Pt bladder scanned
Pt heart rate sustaining in the 130's, diaphoretic and hospital delirium.

## 2023-10-08 NOTE — PROVIDER CONTACT NOTE (OTHER) - ASSESSMENT
Pt is getting more confused and agitated. Heart rate sustaining in 130's and getting diaphoretic.
Pt is resting in bed. Alert&O x3. denies pain and discomfort. Pt has not urinated since last night. Bladder scan read 280 ml of urine. Pt states he does not have to use the bathroom. Attempted twice to use urinal.

## 2023-10-08 NOTE — PROGRESS NOTE ADULT - SUBJECTIVE AND OBJECTIVE BOX
CC- RT hip fracture    HPI:89M, PMH of CAD/ triple-vessel bypass in 2003 with Dr. Vital, CAD, hyperlipidemia, heart murmur, osteoarthritis, recent admission in August 2023 for fall attributed to dehydration, memory loss who presents to ED status post witnessed fall at home while going up 3 steps.  Patient states he was going up and trying to lean forward to go up the next step when he fell backward.  Patient states he did not lose consciousness. He is admitted with a right hip fracture. Underwent IM nail 10/5.     post op course notable for confusion/agitation requiring iv ativan. Also given 1 unit prbcs.   Now improved.     pt seen and examined this am. Feels ok. Not entirely sure why he's in the hospital. Minimal pain at time of exam. No sob/chest pain. tolerating po.     Review of system- All 10 systems reviewed and is as per HPI otherwise negative.       Vital Signs Last 24 Hrs  T(C): 36.8 (08 Oct 2023 08:19), Max: 37.2 (07 Oct 2023 16:00)  T(F): 98.2 (08 Oct 2023 08:19), Max: 99 (07 Oct 2023 16:00)  HR: 92 (08 Oct 2023 08:19) (78 - 92)  BP: 133/64 (08 Oct 2023 08:19) (117/55 - 133/64)  RR: 16 (08 Oct 2023 08:19) (16 - 16)  SpO2: 100% (08 Oct 2023 11:15) (95% - 100%)      PHYSICAL EXAM:    GENERAL: Comfortable, no acute distress   HEAD:  Normocephalic, atraumatic  EYES: EOMI, PERRLA  HEENT: Moist mucous membranes  NECK: Supple, No JVD  NERVOUS SYSTEM:  Alert & Oriented X1, grossly non focal.   CHEST/LUNG: Clear to auscultation bilaterally  HEART: Regular rate and rhythm  ABDOMEN: Soft, Nontender, Nondistended, Bowel sounds present  GENITOURINARY: Voiding, no palpable bladder  EXTREMITIES:   No clubbing, cyanosis, or edema  MUSCULOSKELETAL- Right hip dressing intact.   SKIN-no rash    LABS:                        8.4    8.98  )-----------( 167      ( 08 Oct 2023 06:16 )             24.4     10-08    142  |  107  |  17  ----------------------------<  131<H>  3.6   |  31  |  0.53    Ca    8.1<L>      08 Oct 2023 06:16        Urinalysis Basic - ( 08 Oct 2023 06:16 )    Color: x / Appearance: x / SG: x / pH: x  Gluc: 131 mg/dL / Ketone: x  / Bili: x / Urobili: x   Blood: x / Protein: x / Nitrite: x   Leuk Esterase: x / RBC: x / WBC x   Sq Epi: x / Non Sq Epi: x / Bacteria: x      MEDICATIONS  (STANDING):  acetaminophen     Tablet .. 975 milliGRAM(s) Oral every 8 hours  aspirin enteric coated 81 milliGRAM(s) Oral daily  atorvastatin 80 milliGRAM(s) Oral at bedtime  cyanocobalamin 1000 MICROGram(s) Oral daily  enoxaparin Injectable 40 milliGRAM(s) SubCutaneous every 24 hours  lidocaine   4% Patch 1 Patch Transdermal daily  metoprolol succinate ER 25 milliGRAM(s) Oral daily  multivitamin/minerals/iron Oral Solution (CENTRUM) 15 milliLiter(s) Oral daily  polyethylene glycol 3350 17 Gram(s) Oral at bedtime  senna 2 Tablet(s) Oral at bedtime  sertraline 50 milliGRAM(s) Oral two times a day  sodium chloride 0.9%. 1000 milliLiter(s) (75 mL/Hr) IV Continuous <Continuous>  tamsulosin 0.4 milliGRAM(s) Oral at bedtime  thiamine 100 milliGRAM(s) Oral daily    MEDICATIONS  (PRN):  acetaminophen     Tablet .. 650 milliGRAM(s) Oral every 6 hours PRN Mild Pain (1 - 3)  artificial tears (preservative free) Ophthalmic Solution 2 Drop(s) Both EYES four times a day PRN Dry Eyes  LORazepam   Injectable 0.5 milliGRAM(s) IV Push every 6 hours PRN severe agitation/ if not taking PO meds  ondansetron Injectable 4 milliGRAM(s) IV Push every 6 hours PRN Nausea and/or Vomiting  oxyCODONE    IR 5 milliGRAM(s) Oral every 4 hours PRN Severe Pain (7 - 10)  oxyCODONE    IR 2.5 milliGRAM(s) Oral every 4 hours PRN Moderate Pain (4 - 6)  QUEtiapine 12.5 milliGRAM(s) Oral every 6 hours PRN agitation      Assessment/Plan  #S/p mechanical fall with RT hip fracture- s/p IMN POD#3  -pain control-->limit narcotics with confusion.   -physical therapy  -incentive spirometry  -bowel regimen.     #Dehydration:  -ivf.     #Acute blood loss anemia:  -d/t fracture/surgery  -s/p  1 unit prbcs.    #Mild leukocytosis  -Likely reactive  -resolved    #No evidence of WILL  Cr within normal parameters    #Pre-diabetes  Outpatient f/u    #H/o stroke  #CAD s/p CABG  On Aspirin , statin    #LT thyroid nodule  - outpatient f/u    #Probable dementia with metabolic encephalopathy:  Seroquel prn    #DVt proph- Lovenox x4 weeks    #Dispo- likely rehab on discharge     CC- RT hip fracture    HPI:89M, PMH of CAD/ triple-vessel bypass in 2003 with Dr. Vital, CAD, hyperlipidemia, heart murmur, osteoarthritis, recent admission in August 2023 for fall attributed to dehydration, memory loss who presents to ED status post witnessed fall at home while going up 3 steps.  Patient states he was going up and trying to lean forward to go up the next step when he fell backward.  Patient states he did not lose consciousness. He is admitted with a right hip fracture. Underwent IM nail 10/5.     post op course notable for confusion/agitation requiring iv ativan. Also given 1 unit prbcs.   Now improved.     pt seen and examined this am. Feels ok. Not entirely sure why he's in the hospital. Minimal pain at time of exam. No sob/chest pain. tolerating po.     Review of system- All 10 systems reviewed and is as per HPI otherwise negative.       Vital Signs Last 24 Hrs  T(C): 36.8 (08 Oct 2023 08:19), Max: 37.2 (07 Oct 2023 16:00)  T(F): 98.2 (08 Oct 2023 08:19), Max: 99 (07 Oct 2023 16:00)  HR: 92 (08 Oct 2023 08:19) (78 - 92)  BP: 133/64 (08 Oct 2023 08:19) (117/55 - 133/64)  RR: 16 (08 Oct 2023 08:19) (16 - 16)  SpO2: 100% (08 Oct 2023 11:15) (95% - 100%)      PHYSICAL EXAM:    GENERAL: Comfortable, no acute distress   HEAD:  Normocephalic, atraumatic  EYES: EOMI, PERRLA  HEENT: Moist mucous membranes  NECK: Supple, No JVD  NERVOUS SYSTEM:  Alert & Oriented X1, grossly non focal.   CHEST/LUNG: Clear to auscultation bilaterally  HEART: Regular rate and rhythm  ABDOMEN: Soft, Nontender, Nondistended, Bowel sounds present  GENITOURINARY: Voiding, no palpable bladder  EXTREMITIES:   No clubbing, cyanosis, or edema  MUSCULOSKELETAL- Right hip dressing intact.   SKIN-no rash    LABS:                        8.4    8.98  )-----------( 167      ( 08 Oct 2023 06:16 )             24.4     10-08    142  |  107  |  17  ----------------------------<  131<H>  3.6   |  31  |  0.53    Ca    8.1<L>      08 Oct 2023 06:16        Urinalysis Basic - ( 08 Oct 2023 06:16 )    Color: x / Appearance: x / SG: x / pH: x  Gluc: 131 mg/dL / Ketone: x  / Bili: x / Urobili: x   Blood: x / Protein: x / Nitrite: x   Leuk Esterase: x / RBC: x / WBC x   Sq Epi: x / Non Sq Epi: x / Bacteria: x      MEDICATIONS  (STANDING):  acetaminophen     Tablet .. 975 milliGRAM(s) Oral every 8 hours  aspirin enteric coated 81 milliGRAM(s) Oral daily  atorvastatin 80 milliGRAM(s) Oral at bedtime  cyanocobalamin 1000 MICROGram(s) Oral daily  enoxaparin Injectable 40 milliGRAM(s) SubCutaneous every 24 hours  lidocaine   4% Patch 1 Patch Transdermal daily  metoprolol succinate ER 25 milliGRAM(s) Oral daily  multivitamin/minerals/iron Oral Solution (CENTRUM) 15 milliLiter(s) Oral daily  polyethylene glycol 3350 17 Gram(s) Oral at bedtime  senna 2 Tablet(s) Oral at bedtime  sertraline 50 milliGRAM(s) Oral two times a day  sodium chloride 0.9%. 1000 milliLiter(s) (75 mL/Hr) IV Continuous <Continuous>  tamsulosin 0.4 milliGRAM(s) Oral at bedtime  thiamine 100 milliGRAM(s) Oral daily    MEDICATIONS  (PRN):  acetaminophen     Tablet .. 650 milliGRAM(s) Oral every 6 hours PRN Mild Pain (1 - 3)  artificial tears (preservative free) Ophthalmic Solution 2 Drop(s) Both EYES four times a day PRN Dry Eyes  LORazepam   Injectable 0.5 milliGRAM(s) IV Push every 6 hours PRN severe agitation/ if not taking PO meds  ondansetron Injectable 4 milliGRAM(s) IV Push every 6 hours PRN Nausea and/or Vomiting  oxyCODONE    IR 5 milliGRAM(s) Oral every 4 hours PRN Severe Pain (7 - 10)  oxyCODONE    IR 2.5 milliGRAM(s) Oral every 4 hours PRN Moderate Pain (4 - 6)  QUEtiapine 12.5 milliGRAM(s) Oral every 6 hours PRN agitation      Assessment/Plan  #S/p mechanical fall with RT hip fracture- s/p IMN POD#3  -pain control-->limit narcotics with confusion.   -physical therapy  -incentive spirometry  -bowel regimen.     #Dehydration:  -ivf.     #Acute blood loss anemia:  -d/t fracture/surgery  -s/p  1 unit prbcs with poor response.   -repeat CBC today.    #Mild leukocytosis  -Likely reactive  -resolved    #No evidence of WILL  Cr within normal parameters    #Pre-diabetes  Outpatient f/u    #H/o stroke  #CAD s/p CABG  On Aspirin , statin    #LT thyroid nodule  - outpatient f/u    #Probable dementia with metabolic encephalopathy:  Seroquel prn    #DVt proph- Lovenox x4 weeks    #Dispo- likely rehab on discharge

## 2023-10-08 NOTE — PROGRESS NOTE ADULT - SUBJECTIVE AND OBJECTIVE BOX
Patient seen and examined at bedside this AM. Pain is well controlled at rest. Pt with mild confusion overnight. No acute events overnight.     Vital Signs Last 24 Hrs  T(C): 37 (08 Oct 2023 00:05), Max: 37.2 (07 Oct 2023 16:00)  T(F): 98.6 (08 Oct 2023 00:05), Max: 99 (07 Oct 2023 16:00)  HR: 92 (08 Oct 2023 00:05) (78 - 94)  BP: 125/68 (08 Oct 2023 00:05) (117/55 - 132/64)  BP(mean): --  RR: 16 (08 Oct 2023 00:05) (16 - 16)  SpO2: 100% (08 Oct 2023 00:05) (99% - 100%)    Parameters below as of 08 Oct 2023 00:05  Patient On (Oxygen Delivery Method): nasal cannula  O2 Flow (L/min): 2      Exam:  Gen: NAD, resting comfortably  RLE  Dressing c/d/i  +EHL/FHL/TA/GS  SILT dp/sp/saph/sural/tibial  +DP  Calf NTTP b/l  Compartments soft and compressible    A/P:  89y Male POD3 R IMN, 10/5    -FU labs; given 1 unit on 10/7  -WBAT  -Pain control  -PT/OT  -S/p periop abx ABX  -DVT PE ppx- per primary team, lovenox 40 qday  -med management per primary team; straight catheterized on 10/7  -Incentive spirometry  -Dispo [facility]  -No further acute orthopaedic surgical intervention indicated

## 2023-10-09 ENCOUNTER — TRANSCRIPTION ENCOUNTER (OUTPATIENT)
Age: 88
End: 2023-10-09

## 2023-10-09 VITALS
SYSTOLIC BLOOD PRESSURE: 115 MMHG | TEMPERATURE: 98 F | RESPIRATION RATE: 18 BRPM | HEART RATE: 81 BPM | OXYGEN SATURATION: 96 % | DIASTOLIC BLOOD PRESSURE: 60 MMHG

## 2023-10-09 LAB
ANION GAP SERPL CALC-SCNC: 2 MMOL/L — LOW (ref 5–17)
BUN SERPL-MCNC: 18 MG/DL — SIGNIFICANT CHANGE UP (ref 7–23)
CALCIUM SERPL-MCNC: 7.8 MG/DL — LOW (ref 8.5–10.1)
CHLORIDE SERPL-SCNC: 109 MMOL/L — HIGH (ref 96–108)
CO2 SERPL-SCNC: 30 MMOL/L — SIGNIFICANT CHANGE UP (ref 22–31)
CREAT SERPL-MCNC: 0.49 MG/DL — LOW (ref 0.5–1.3)
EGFR: 97 ML/MIN/1.73M2 — SIGNIFICANT CHANGE UP
GLUCOSE SERPL-MCNC: 124 MG/DL — HIGH (ref 70–99)
HCT VFR BLD CALC: 22.4 % — LOW (ref 39–50)
HCT VFR BLD CALC: 24.4 % — LOW (ref 39–50)
HGB BLD-MCNC: 7.5 G/DL — LOW (ref 13–17)
HGB BLD-MCNC: 8.2 G/DL — LOW (ref 13–17)
MCHC RBC-ENTMCNC: 32.2 PG — SIGNIFICANT CHANGE UP (ref 27–34)
MCHC RBC-ENTMCNC: 33.1 PG — SIGNIFICANT CHANGE UP (ref 27–34)
MCHC RBC-ENTMCNC: 33.5 GM/DL — SIGNIFICANT CHANGE UP (ref 32–36)
MCHC RBC-ENTMCNC: 33.6 GM/DL — SIGNIFICANT CHANGE UP (ref 32–36)
MCV RBC AUTO: 96.1 FL — SIGNIFICANT CHANGE UP (ref 80–100)
MCV RBC AUTO: 98.4 FL — SIGNIFICANT CHANGE UP (ref 80–100)
PLATELET # BLD AUTO: 197 K/UL — SIGNIFICANT CHANGE UP (ref 150–400)
PLATELET # BLD AUTO: 216 K/UL — SIGNIFICANT CHANGE UP (ref 150–400)
POTASSIUM SERPL-MCNC: 3.4 MMOL/L — LOW (ref 3.5–5.3)
POTASSIUM SERPL-SCNC: 3.4 MMOL/L — LOW (ref 3.5–5.3)
RBC # BLD: 2.33 M/UL — LOW (ref 4.2–5.8)
RBC # BLD: 2.48 M/UL — LOW (ref 4.2–5.8)
RBC # FLD: 14 % — SIGNIFICANT CHANGE UP (ref 10.3–14.5)
RBC # FLD: 14.1 % — SIGNIFICANT CHANGE UP (ref 10.3–14.5)
SODIUM SERPL-SCNC: 141 MMOL/L — SIGNIFICANT CHANGE UP (ref 135–145)
WBC # BLD: 8.5 K/UL — SIGNIFICANT CHANGE UP (ref 3.8–10.5)
WBC # BLD: 8.85 K/UL — SIGNIFICANT CHANGE UP (ref 3.8–10.5)
WBC # FLD AUTO: 8.5 K/UL — SIGNIFICANT CHANGE UP (ref 3.8–10.5)
WBC # FLD AUTO: 8.85 K/UL — SIGNIFICANT CHANGE UP (ref 3.8–10.5)

## 2023-10-09 PROCEDURE — 99239 HOSP IP/OBS DSCHRG MGMT >30: CPT

## 2023-10-09 RX ORDER — POTASSIUM CHLORIDE 20 MEQ
40 PACKET (EA) ORAL ONCE
Refills: 0 | Status: COMPLETED | OUTPATIENT
Start: 2023-10-09 | End: 2023-10-09

## 2023-10-09 RX ORDER — TAMSULOSIN HYDROCHLORIDE 0.4 MG/1
1 CAPSULE ORAL
Qty: 0 | Refills: 0 | DISCHARGE
Start: 2023-10-09

## 2023-10-09 RX ORDER — ENOXAPARIN SODIUM 100 MG/ML
40 INJECTION SUBCUTANEOUS
Qty: 0 | Refills: 0 | DISCHARGE
Start: 2023-10-09

## 2023-10-09 RX ORDER — POLYETHYLENE GLYCOL 3350 17 G/17G
17 POWDER, FOR SOLUTION ORAL
Qty: 0 | Refills: 0 | DISCHARGE
Start: 2023-10-09

## 2023-10-09 RX ORDER — SENNA PLUS 8.6 MG/1
2 TABLET ORAL
Qty: 0 | Refills: 0 | DISCHARGE
Start: 2023-10-09

## 2023-10-09 RX ADMIN — Medication 975 MILLIGRAM(S): at 05:45

## 2023-10-09 RX ADMIN — Medication 15 MILLILITER(S): at 09:23

## 2023-10-09 RX ADMIN — Medication 975 MILLIGRAM(S): at 13:26

## 2023-10-09 RX ADMIN — ENOXAPARIN SODIUM 40 MILLIGRAM(S): 100 INJECTION SUBCUTANEOUS at 05:44

## 2023-10-09 RX ADMIN — Medication 100 MILLIGRAM(S): at 09:21

## 2023-10-09 RX ADMIN — Medication 40 MILLIEQUIVALENT(S): at 09:20

## 2023-10-09 RX ADMIN — PREGABALIN 1000 MICROGRAM(S): 225 CAPSULE ORAL at 09:22

## 2023-10-09 RX ADMIN — Medication 81 MILLIGRAM(S): at 09:20

## 2023-10-09 RX ADMIN — OXYCODONE HYDROCHLORIDE 5 MILLIGRAM(S): 5 TABLET ORAL at 09:51

## 2023-10-09 RX ADMIN — SERTRALINE 50 MILLIGRAM(S): 25 TABLET, FILM COATED ORAL at 09:22

## 2023-10-09 RX ADMIN — Medication 25 MILLIGRAM(S): at 09:22

## 2023-10-09 RX ADMIN — OXYCODONE HYDROCHLORIDE 5 MILLIGRAM(S): 5 TABLET ORAL at 09:21

## 2023-10-09 RX ADMIN — LIDOCAINE 1 PATCH: 4 CREAM TOPICAL at 09:21

## 2023-10-09 NOTE — DISCHARGE NOTE NURSING/CASE MANAGEMENT/SOCIAL WORK - NSDCPEFALRISK_GEN_ALL_CORE
For information on Fall & Injury Prevention, visit: [email or web address] OR  [email or web address] OR  https://www.cdc.gov/steadi/patient.html

## 2023-10-09 NOTE — PROGRESS NOTE ADULT - SUBJECTIVE AND OBJECTIVE BOX
CC- RT hip fracture    HPI:89M, PMH of CAD/ triple-vessel bypass in 2003 with Dr. Vital, CAD, hyperlipidemia, heart murmur, osteoarthritis, recent admission in August 2023 for fall attributed to dehydration, memory loss who presents to ED status post witnessed fall at home while going up 3 steps.  Patient states he was going up and trying to lean forward to go up the next step when he fell backward.  Patient states he did not lose consciousness. He is admitted with a right hip fracture. Underwent IM nail 10/5.     post op course notable for confusion/agitation requiring iv ativan. Also given 1 unit prbcs.   Now improved.     pt seen and examined this am. Felt well. Knew he was in the hospital but didn't realize he was here for a hip fracture. Denies pain . no sob/chest pain. tolerating po.     Review of system- All 10 systems reviewed and is as per HPI otherwise negative.       Vital Signs Last 24 Hrs  T(C): 36.8 (09 Oct 2023 08:01), Max: 36.8 (08 Oct 2023 16:00)  T(F): 98.2 (09 Oct 2023 08:01), Max: 98.2 (08 Oct 2023 16:00)  HR: 91 (09 Oct 2023 08:01) (83 - 110)  BP: 140/62 (09 Oct 2023 08:01) (140/62 - 154/86)  BP(mean): 107 (08 Oct 2023 16:00) (107 - 107)  RR: 16 (09 Oct 2023 08:01) (16 - 18)  SpO2: 95% (09 Oct 2023 08:01) (95% - 99%)    Parameters below as of 09 Oct 2023 08:01  Patient On (Oxygen Delivery Method): room air          PHYSICAL EXAM:    GENERAL: Comfortable, no acute distress   HEAD:  Normocephalic, atraumatic  EYES: EOMI, PERRLA  HEENT: Moist mucous membranes  NECK: Supple, No JVD  NERVOUS SYSTEM:  Alert & Oriented X1, grossly non focal.   CHEST/LUNG: Clear to auscultation bilaterally  HEART: Regular rate and rhythm  ABDOMEN: Soft, Nontender, Nondistended, Bowel sounds present  GENITOURINARY: Voiding, no palpable bladder  EXTREMITIES:   No clubbing, cyanosis, or edema  MUSCULOSKELETAL- Right hip dressing intact. Underlying hematoma+  SKIN-no rash    LABS:                        8.2    8.85  )-----------( 216      ( 09 Oct 2023 09:49 )             24.4     10-09    141  |  109<H>  |  18  ----------------------------<  124<H>  3.4<L>   |  30  |  0.49<L>    Ca    7.8<L>      09 Oct 2023 07:40        Urinalysis Basic - ( 09 Oct 2023 07:40 )    Color: x / Appearance: x / SG: x / pH: x  Gluc: 124 mg/dL / Ketone: x  / Bili: x / Urobili: x   Blood: x / Protein: x / Nitrite: x   Leuk Esterase: x / RBC: x / WBC x   Sq Epi: x / Non Sq Epi: x / Bacteria: x    MEDICATIONS  (STANDING):  acetaminophen     Tablet .. 975 milliGRAM(s) Oral every 8 hours  aspirin enteric coated 81 milliGRAM(s) Oral daily  atorvastatin 80 milliGRAM(s) Oral at bedtime  cyanocobalamin 1000 MICROGram(s) Oral daily  enoxaparin Injectable 40 milliGRAM(s) SubCutaneous every 24 hours  lidocaine   4% Patch 1 Patch Transdermal daily  metoprolol succinate ER 25 milliGRAM(s) Oral daily  multivitamin/minerals/iron Oral Solution (CENTRUM) 15 milliLiter(s) Oral daily  polyethylene glycol 3350 17 Gram(s) Oral at bedtime  senna 2 Tablet(s) Oral at bedtime  sertraline 50 milliGRAM(s) Oral two times a day  sodium chloride 0.9%. 1000 milliLiter(s) (75 mL/Hr) IV Continuous <Continuous>  tamsulosin 0.4 milliGRAM(s) Oral at bedtime  thiamine 100 milliGRAM(s) Oral daily    MEDICATIONS  (PRN):  acetaminophen     Tablet .. 650 milliGRAM(s) Oral every 6 hours PRN Mild Pain (1 - 3)  artificial tears (preservative free) Ophthalmic Solution 2 Drop(s) Both EYES four times a day PRN Dry Eyes  LORazepam   Injectable 0.5 milliGRAM(s) IV Push every 6 hours PRN severe agitation/ if not taking PO meds  ondansetron Injectable 4 milliGRAM(s) IV Push every 6 hours PRN Nausea and/or Vomiting  oxyCODONE    IR 5 milliGRAM(s) Oral every 4 hours PRN Severe Pain (7 - 10)  oxyCODONE    IR 2.5 milliGRAM(s) Oral every 4 hours PRN Moderate Pain (4 - 6)  QUEtiapine 12.5 milliGRAM(s) Oral every 6 hours PRN agitation      Assessment/Plan  #S/p mechanical fall with RT hip fracture- s/p IMN POD#4  -pain control  -physical therapy  -incentive spirometry  -bowel regimen.     #Dehydration:  -improved with ivf.     #Acute blood loss anemia:  -d/t fracture/surgery  -s/p  1 unit prbcs with poor response.   -cbc repeated today-->stable.     #Mild leukocytosis  -Likely reactive  -resolved    #No evidence of WILL  Cr within normal parameters    #Pre-diabetes  Outpatient f/u    #H/o stroke  #CAD s/p CABG  On Aspirin , statin    #LT thyroid nodule  - outpatient f/u    #Probable dementia with metabolic encephalopathy:  Seroquel prn    #DVt proph- Lovenox x4 weeks    #Dispo-   danisha today

## 2023-10-09 NOTE — PROGRESS NOTE ADULT - ATTENDING COMMENTS
For surgical fixation of right hip fracture today.
ORTHO STABLE. MAY FOLLOW AS OUTPATIENT AFTER DISCHARGE.
Ortho stable. May follow as outpatient after discharge when cleared by medicine and PT.
Ortho stable. May follow as outpatient after discharge when cleared by medicine and PT.

## 2023-10-09 NOTE — DISCHARGE NOTE NURSING/CASE MANAGEMENT/SOCIAL WORK - PATIENT PORTAL LINK FT
You can access the FollowMyHealth Patient Portal offered by [facility] by registering at the following website: [email or web address]. By joining [[email or web address]]’s FollowMyHealth portal, you will also be able to view your health information using other applications (apps) compatible with our system.

## 2023-10-09 NOTE — PROGRESS NOTE ADULT - SUBJECTIVE AND OBJECTIVE BOX
Patient seen and examined at bedside this AM. Pain is well controlled at rest. Pt with mild confusion overnight. No acute events overnight.           LABS:                        9.8    9.53  )-----------( 179      ( 08 Oct 2023 12:26 )             27.9     10-08    142  |  107  |  17  ----------------------------<  131<H>  3.6   |  31  |  0.53    Ca    8.1<L>      08 Oct 2023 06:16        Urinalysis Basic - ( 08 Oct 2023 06:16 )    Color: x / Appearance: x / SG: x / pH: x  Gluc: 131 mg/dL / Ketone: x  / Bili: x / Urobili: x   Blood: x / Protein: x / Nitrite: x   Leuk Esterase: x / RBC: x / WBC x   Sq Epi: x / Non Sq Epi: x / Bacteria: x        VITAL SIGNS:  T(C): 36.6 (10-09-23 @ 00:08), Max: 36.8 (10-08-23 @ 08:19)  HR: 110 (10-09-23 @ 00:08) (83 - 110)  BP: 148/72 (10-09-23 @ 00:08) (133/64 - 154/86)  RR: 18 (10-09-23 @ 00:08) (16 - 18)  SpO2: 95% (10-09-23 @ 00:08) (95% - 100%)    Exam:  Gen: NAD, resting comfortably  RLE  Dressing c/d/i  +EHL/FHL/TA/GS  SILT dp/sp/saph/sural/tibial  +DP  Calf NTTP b/l  Compartments soft and compressible          A/P:  89y Male s/p R IMN, 10/5    -FU labs  -1 unit PRBC on 10/7  -WBAT  -Pain control  -PT/OT  -S/p periop abx ABX  -DVT PE ppx- per primary team, lovenox 40 qday  -med management per primary team  -straight catheterized on 10/7 x1  -Dressing changes PRN  -Incentive spirometry  -Dispo [facility]  -No further acute orthopaedic surgical intervention indicated  -Ortho stable for dc

## 2023-10-09 NOTE — PROGRESS NOTE ADULT - PROVIDER SPECIALTY LIST ADULT
Orthopedics
Hospitalist
Orthopedics
Hospitalist
Orthopedics
Orthopedics
Cardiology

## 2023-10-13 DIAGNOSIS — I10 ESSENTIAL (PRIMARY) HYPERTENSION: ICD-10-CM

## 2023-10-13 DIAGNOSIS — I44.4 LEFT ANTERIOR FASCICULAR BLOCK: ICD-10-CM

## 2023-10-13 DIAGNOSIS — F32.A DEPRESSION, UNSPECIFIED: ICD-10-CM

## 2023-10-13 DIAGNOSIS — R73.9 HYPERGLYCEMIA, UNSPECIFIED: ICD-10-CM

## 2023-10-13 DIAGNOSIS — R73.03 PREDIABETES: ICD-10-CM

## 2023-10-13 DIAGNOSIS — Z95.1 PRESENCE OF AORTOCORONARY BYPASS GRAFT: ICD-10-CM

## 2023-10-13 DIAGNOSIS — S72.141A DISPLACED INTERTROCHANTERIC FRACTURE OF RIGHT FEMUR, INITIAL ENCOUNTER FOR CLOSED FRACTURE: ICD-10-CM

## 2023-10-13 DIAGNOSIS — E04.1 NONTOXIC SINGLE THYROID NODULE: ICD-10-CM

## 2023-10-13 DIAGNOSIS — Z79.02 LONG TERM (CURRENT) USE OF ANTITHROMBOTICS/ANTIPLATELETS: ICD-10-CM

## 2023-10-13 DIAGNOSIS — E86.0 DEHYDRATION: ICD-10-CM
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
## 2023-10-13 DIAGNOSIS — Z20.822 CONTACT WITH AND (SUSPECTED) EXPOSURE TO COVID-19: ICD-10-CM

## 2023-10-13 DIAGNOSIS — G93.41 METABOLIC ENCEPHALOPATHY: ICD-10-CM

## 2023-10-13 DIAGNOSIS — R00.0 TACHYCARDIA, UNSPECIFIED: ICD-10-CM

## 2023-10-13 DIAGNOSIS — D72.828 OTHER ELEVATED WHITE BLOOD CELL COUNT: ICD-10-CM
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
## 2023-10-13 DIAGNOSIS — I25.10 ATHEROSCLEROTIC HEART DISEASE OF NATIVE CORONARY ARTERY WITHOUT ANGINA PECTORIS: ICD-10-CM

## 2023-10-13 DIAGNOSIS — F03.911 UNSPECIFIED DEMENTIA, UNSPECIFIED SEVERITY, WITH AGITATION: ICD-10-CM

## 2023-10-13 DIAGNOSIS — Z79.82 LONG TERM (CURRENT) USE OF ASPIRIN: ICD-10-CM

## 2023-10-13 DIAGNOSIS — I34.0 NONRHEUMATIC MITRAL (VALVE) INSUFFICIENCY: ICD-10-CM

## 2023-10-13 DIAGNOSIS — E78.5 HYPERLIPIDEMIA, UNSPECIFIED: ICD-10-CM
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
## 2023-10-13 DIAGNOSIS — Z79.899 OTHER LONG TERM (CURRENT) DRUG THERAPY: ICD-10-CM

## 2023-10-13 DIAGNOSIS — M19.90 UNSPECIFIED OSTEOARTHRITIS, UNSPECIFIED SITE: ICD-10-CM

## 2023-10-13 DIAGNOSIS — I45.9 CONDUCTION DISORDER, UNSPECIFIED: ICD-10-CM

## 2023-10-13 DIAGNOSIS — Z86.73 PERSONAL HISTORY OF TRANSIENT ISCHEMIC ATTACK (TIA), AND CEREBRAL INFARCTION WITHOUT RESIDUAL DEFICITS: ICD-10-CM

## 2023-10-13 DIAGNOSIS — I44.0 ATRIOVENTRICULAR BLOCK, FIRST DEGREE: ICD-10-CM

## 2023-10-13 DIAGNOSIS — I07.1 RHEUMATIC TRICUSPID INSUFFICIENCY: ICD-10-CM

## 2023-10-13 DIAGNOSIS — D62 ACUTE POSTHEMORRHAGIC ANEMIA: ICD-10-CM

## 2023-10-29 ENCOUNTER — EMERGENCY (EMERGENCY)
Facility: HOSPITAL | Age: 88
LOS: 0 days | Discharge: HOME | End: 2023-10-29
Attending: EMERGENCY MEDICINE
Payer: MEDICARE

## 2023-10-29 VITALS
DIASTOLIC BLOOD PRESSURE: 65 MMHG | TEMPERATURE: 98 F | OXYGEN SATURATION: 88 % | WEIGHT: 145.06 LBS | HEIGHT: 70 IN | RESPIRATION RATE: 18 BRPM | SYSTOLIC BLOOD PRESSURE: 131 MMHG | HEART RATE: 55 BPM

## 2023-10-29 VITALS
OXYGEN SATURATION: 98 % | HEART RATE: 76 BPM | TEMPERATURE: 98 F | RESPIRATION RATE: 18 BRPM | SYSTOLIC BLOOD PRESSURE: 126 MMHG | DIASTOLIC BLOOD PRESSURE: 68 MMHG

## 2023-10-29 DIAGNOSIS — S30.1XXA CONTUSION OF ABDOMINAL WALL, INITIAL ENCOUNTER: ICD-10-CM

## 2023-10-29 DIAGNOSIS — R31.9 HEMATURIA, UNSPECIFIED: ICD-10-CM

## 2023-10-29 DIAGNOSIS — K62.5 HEMORRHAGE OF ANUS AND RECTUM: ICD-10-CM

## 2023-10-29 DIAGNOSIS — M25.551 PAIN IN RIGHT HIP: ICD-10-CM

## 2023-10-29 DIAGNOSIS — Z95.1 PRESENCE OF AORTOCORONARY BYPASS GRAFT: Chronic | ICD-10-CM

## 2023-10-29 DIAGNOSIS — I10 ESSENTIAL (PRIMARY) HYPERTENSION: ICD-10-CM

## 2023-10-29 DIAGNOSIS — E78.5 HYPERLIPIDEMIA, UNSPECIFIED: ICD-10-CM
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
## 2023-10-29 DIAGNOSIS — Z95.1 PRESENCE OF AORTOCORONARY BYPASS GRAFT: ICD-10-CM

## 2023-10-29 DIAGNOSIS — T84.010A BROKEN INTERNAL RIGHT HIP PROSTHESIS, INITIAL ENCOUNTER: ICD-10-CM

## 2023-10-29 DIAGNOSIS — F32.A DEPRESSION, UNSPECIFIED: ICD-10-CM

## 2023-10-29 DIAGNOSIS — I25.10 ATHEROSCLEROTIC HEART DISEASE OF NATIVE CORONARY ARTERY WITHOUT ANGINA PECTORIS: ICD-10-CM

## 2023-10-29 DIAGNOSIS — M79.604 PAIN IN RIGHT LEG: ICD-10-CM
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
## 2023-10-29 DIAGNOSIS — Z86.73 PERSONAL HISTORY OF TRANSIENT ISCHEMIC ATTACK (TIA), AND CEREBRAL INFARCTION WITHOUT RESIDUAL DEFICITS: ICD-10-CM

## 2023-10-29 DIAGNOSIS — Z79.82 LONG TERM (CURRENT) USE OF ASPIRIN: ICD-10-CM

## 2023-10-29 PROBLEM — I63.9 CEREBRAL INFARCTION, UNSPECIFIED: Chronic | Status: ACTIVE | Noted: 2023-10-05

## 2023-10-29 LAB
ALBUMIN SERPL ELPH-MCNC: 3 G/DL — LOW (ref 3.3–5)
ALBUMIN SERPL ELPH-MCNC: 3 G/DL — LOW (ref 3.3–5)
ALP SERPL-CCNC: 191 U/L — HIGH (ref 40–120)
ALP SERPL-CCNC: 191 U/L — HIGH (ref 40–120)
ALT FLD-CCNC: 16 U/L — SIGNIFICANT CHANGE UP (ref 12–78)
ALT FLD-CCNC: 16 U/L — SIGNIFICANT CHANGE UP (ref 12–78)
ANION GAP SERPL CALC-SCNC: 5 MMOL/L — SIGNIFICANT CHANGE UP (ref 5–17)
ANION GAP SERPL CALC-SCNC: 5 MMOL/L — SIGNIFICANT CHANGE UP (ref 5–17)
APPEARANCE UR: CLEAR — SIGNIFICANT CHANGE UP
APPEARANCE UR: CLEAR — SIGNIFICANT CHANGE UP
APTT BLD: 45 SEC — HIGH (ref 24.5–35.6)
APTT BLD: 45 SEC — HIGH (ref 24.5–35.6)
AST SERPL-CCNC: 23 U/L — SIGNIFICANT CHANGE UP (ref 15–37)
AST SERPL-CCNC: 23 U/L — SIGNIFICANT CHANGE UP (ref 15–37)
BACTERIA # UR AUTO: NEGATIVE /HPF — SIGNIFICANT CHANGE UP
BACTERIA # UR AUTO: NEGATIVE /HPF — SIGNIFICANT CHANGE UP
BASOPHILS # BLD AUTO: 0.05 K/UL — SIGNIFICANT CHANGE UP (ref 0–0.2)
BASOPHILS # BLD AUTO: 0.05 K/UL — SIGNIFICANT CHANGE UP (ref 0–0.2)
BASOPHILS NFR BLD AUTO: 0.5 % — SIGNIFICANT CHANGE UP (ref 0–2)
BASOPHILS NFR BLD AUTO: 0.5 % — SIGNIFICANT CHANGE UP (ref 0–2)
BILIRUB SERPL-MCNC: 0.9 MG/DL — SIGNIFICANT CHANGE UP (ref 0.2–1.2)
BILIRUB SERPL-MCNC: 0.9 MG/DL — SIGNIFICANT CHANGE UP (ref 0.2–1.2)
BILIRUB UR-MCNC: NEGATIVE — SIGNIFICANT CHANGE UP
BILIRUB UR-MCNC: NEGATIVE — SIGNIFICANT CHANGE UP
BLD GP AB SCN SERPL QL: SIGNIFICANT CHANGE UP
BLD GP AB SCN SERPL QL: SIGNIFICANT CHANGE UP
BUN SERPL-MCNC: 16 MG/DL — SIGNIFICANT CHANGE UP (ref 7–23)
BUN SERPL-MCNC: 16 MG/DL — SIGNIFICANT CHANGE UP (ref 7–23)
CALCIUM SERPL-MCNC: 8.5 MG/DL — SIGNIFICANT CHANGE UP (ref 8.5–10.1)
CALCIUM SERPL-MCNC: 8.5 MG/DL — SIGNIFICANT CHANGE UP (ref 8.5–10.1)
CAST: 1 /LPF — SIGNIFICANT CHANGE UP (ref 0–4)
CAST: 1 /LPF — SIGNIFICANT CHANGE UP (ref 0–4)
CHLORIDE SERPL-SCNC: 106 MMOL/L — SIGNIFICANT CHANGE UP (ref 96–108)
CHLORIDE SERPL-SCNC: 106 MMOL/L — SIGNIFICANT CHANGE UP (ref 96–108)
CO2 SERPL-SCNC: 30 MMOL/L — SIGNIFICANT CHANGE UP (ref 22–31)
CO2 SERPL-SCNC: 30 MMOL/L — SIGNIFICANT CHANGE UP (ref 22–31)
COLOR SPEC: SIGNIFICANT CHANGE UP
COLOR SPEC: SIGNIFICANT CHANGE UP
CREAT SERPL-MCNC: 0.66 MG/DL — SIGNIFICANT CHANGE UP (ref 0.5–1.3)
CREAT SERPL-MCNC: 0.66 MG/DL — SIGNIFICANT CHANGE UP (ref 0.5–1.3)
DIFF PNL FLD: ABNORMAL
DIFF PNL FLD: ABNORMAL
EGFR: 89 ML/MIN/1.73M2 — SIGNIFICANT CHANGE UP
EGFR: 89 ML/MIN/1.73M2 — SIGNIFICANT CHANGE UP
EOSINOPHIL # BLD AUTO: 0.1 K/UL — SIGNIFICANT CHANGE UP (ref 0–0.5)
EOSINOPHIL # BLD AUTO: 0.1 K/UL — SIGNIFICANT CHANGE UP (ref 0–0.5)
EOSINOPHIL NFR BLD AUTO: 1 % — SIGNIFICANT CHANGE UP (ref 0–6)
EOSINOPHIL NFR BLD AUTO: 1 % — SIGNIFICANT CHANGE UP (ref 0–6)
GLUCOSE SERPL-MCNC: 104 MG/DL — HIGH (ref 70–99)
GLUCOSE SERPL-MCNC: 104 MG/DL — HIGH (ref 70–99)
GLUCOSE UR QL: NEGATIVE MG/DL — SIGNIFICANT CHANGE UP
GLUCOSE UR QL: NEGATIVE MG/DL — SIGNIFICANT CHANGE UP
HCT VFR BLD CALC: 36.3 % — LOW (ref 39–50)
HCT VFR BLD CALC: 36.3 % — LOW (ref 39–50)
HGB BLD-MCNC: 11.7 G/DL — LOW (ref 13–17)
HGB BLD-MCNC: 11.7 G/DL — LOW (ref 13–17)
IMM GRANULOCYTES NFR BLD AUTO: 0.3 % — SIGNIFICANT CHANGE UP (ref 0–0.9)
IMM GRANULOCYTES NFR BLD AUTO: 0.3 % — SIGNIFICANT CHANGE UP (ref 0–0.9)
INR BLD: 1.11 RATIO — SIGNIFICANT CHANGE UP (ref 0.85–1.18)
INR BLD: 1.11 RATIO — SIGNIFICANT CHANGE UP (ref 0.85–1.18)
KETONES UR-MCNC: 15 MG/DL
KETONES UR-MCNC: 15 MG/DL
LEUKOCYTE ESTERASE UR-ACNC: ABNORMAL
LEUKOCYTE ESTERASE UR-ACNC: ABNORMAL
LYMPHOCYTES # BLD AUTO: 1.37 K/UL — SIGNIFICANT CHANGE UP (ref 1–3.3)
LYMPHOCYTES # BLD AUTO: 1.37 K/UL — SIGNIFICANT CHANGE UP (ref 1–3.3)
LYMPHOCYTES # BLD AUTO: 14.4 % — SIGNIFICANT CHANGE UP (ref 13–44)
LYMPHOCYTES # BLD AUTO: 14.4 % — SIGNIFICANT CHANGE UP (ref 13–44)
MCHC RBC-ENTMCNC: 31.5 PG — SIGNIFICANT CHANGE UP (ref 27–34)
MCHC RBC-ENTMCNC: 31.5 PG — SIGNIFICANT CHANGE UP (ref 27–34)
MCHC RBC-ENTMCNC: 32.2 GM/DL — SIGNIFICANT CHANGE UP (ref 32–36)
MCHC RBC-ENTMCNC: 32.2 GM/DL — SIGNIFICANT CHANGE UP (ref 32–36)
MCV RBC AUTO: 97.8 FL — SIGNIFICANT CHANGE UP (ref 80–100)
MCV RBC AUTO: 97.8 FL — SIGNIFICANT CHANGE UP (ref 80–100)
MONOCYTES # BLD AUTO: 0.72 K/UL — SIGNIFICANT CHANGE UP (ref 0–0.9)
MONOCYTES # BLD AUTO: 0.72 K/UL — SIGNIFICANT CHANGE UP (ref 0–0.9)
MONOCYTES NFR BLD AUTO: 7.6 % — SIGNIFICANT CHANGE UP (ref 2–14)
MONOCYTES NFR BLD AUTO: 7.6 % — SIGNIFICANT CHANGE UP (ref 2–14)
NEUTROPHILS # BLD AUTO: 7.26 K/UL — SIGNIFICANT CHANGE UP (ref 1.8–7.4)
NEUTROPHILS # BLD AUTO: 7.26 K/UL — SIGNIFICANT CHANGE UP (ref 1.8–7.4)
NEUTROPHILS NFR BLD AUTO: 76.2 % — SIGNIFICANT CHANGE UP (ref 43–77)
NEUTROPHILS NFR BLD AUTO: 76.2 % — SIGNIFICANT CHANGE UP (ref 43–77)
NITRITE UR-MCNC: NEGATIVE — SIGNIFICANT CHANGE UP
NITRITE UR-MCNC: NEGATIVE — SIGNIFICANT CHANGE UP
PH UR: 6 — SIGNIFICANT CHANGE UP (ref 5–8)
PH UR: 6 — SIGNIFICANT CHANGE UP (ref 5–8)
PLATELET # BLD AUTO: 361 K/UL — SIGNIFICANT CHANGE UP (ref 150–400)
PLATELET # BLD AUTO: 361 K/UL — SIGNIFICANT CHANGE UP (ref 150–400)
POTASSIUM SERPL-MCNC: 4.2 MMOL/L — SIGNIFICANT CHANGE UP (ref 3.5–5.3)
POTASSIUM SERPL-MCNC: 4.2 MMOL/L — SIGNIFICANT CHANGE UP (ref 3.5–5.3)
POTASSIUM SERPL-SCNC: 4.2 MMOL/L — SIGNIFICANT CHANGE UP (ref 3.5–5.3)
POTASSIUM SERPL-SCNC: 4.2 MMOL/L — SIGNIFICANT CHANGE UP (ref 3.5–5.3)
PROT SERPL-MCNC: 6.6 GM/DL — SIGNIFICANT CHANGE UP (ref 6–8.3)
PROT SERPL-MCNC: 6.6 GM/DL — SIGNIFICANT CHANGE UP (ref 6–8.3)
PROT UR-MCNC: NEGATIVE MG/DL — SIGNIFICANT CHANGE UP
PROT UR-MCNC: NEGATIVE MG/DL — SIGNIFICANT CHANGE UP
PROTHROM AB SERPL-ACNC: 12.5 SEC — SIGNIFICANT CHANGE UP (ref 9.5–13)
PROTHROM AB SERPL-ACNC: 12.5 SEC — SIGNIFICANT CHANGE UP (ref 9.5–13)
RBC # BLD: 3.71 M/UL — LOW (ref 4.2–5.8)
RBC # BLD: 3.71 M/UL — LOW (ref 4.2–5.8)
RBC # FLD: 14.4 % — SIGNIFICANT CHANGE UP (ref 10.3–14.5)
RBC # FLD: 14.4 % — SIGNIFICANT CHANGE UP (ref 10.3–14.5)
RBC CASTS # UR COMP ASSIST: 17 /HPF — HIGH (ref 0–4)
RBC CASTS # UR COMP ASSIST: 17 /HPF — HIGH (ref 0–4)
SODIUM SERPL-SCNC: 141 MMOL/L — SIGNIFICANT CHANGE UP (ref 135–145)
SODIUM SERPL-SCNC: 141 MMOL/L — SIGNIFICANT CHANGE UP (ref 135–145)
SP GR SPEC: 1.02 — SIGNIFICANT CHANGE UP (ref 1–1.03)
SP GR SPEC: 1.02 — SIGNIFICANT CHANGE UP (ref 1–1.03)
SQUAMOUS # UR AUTO: 0 /HPF — SIGNIFICANT CHANGE UP (ref 0–5)
SQUAMOUS # UR AUTO: 0 /HPF — SIGNIFICANT CHANGE UP (ref 0–5)
TROPONIN I, HIGH SENSITIVITY RESULT: 10.67 NG/L — SIGNIFICANT CHANGE UP
TROPONIN I, HIGH SENSITIVITY RESULT: 10.67 NG/L — SIGNIFICANT CHANGE UP
UROBILINOGEN FLD QL: 4 MG/DL (ref 0.2–1)
UROBILINOGEN FLD QL: 4 MG/DL (ref 0.2–1)
WBC # BLD: 9.53 K/UL — SIGNIFICANT CHANGE UP (ref 3.8–10.5)
WBC # BLD: 9.53 K/UL — SIGNIFICANT CHANGE UP (ref 3.8–10.5)
WBC # FLD AUTO: 9.53 K/UL — SIGNIFICANT CHANGE UP (ref 3.8–10.5)
WBC # FLD AUTO: 9.53 K/UL — SIGNIFICANT CHANGE UP (ref 3.8–10.5)
WBC UR QL: 1 /HPF — SIGNIFICANT CHANGE UP (ref 0–5)
WBC UR QL: 1 /HPF — SIGNIFICANT CHANGE UP (ref 0–5)

## 2023-10-29 PROCEDURE — 73502 X-RAY EXAM HIP UNI 2-3 VIEWS: CPT | Mod: RT

## 2023-10-29 PROCEDURE — 86850 RBC ANTIBODY SCREEN: CPT

## 2023-10-29 PROCEDURE — 36415 COLL VENOUS BLD VENIPUNCTURE: CPT

## 2023-10-29 PROCEDURE — 85610 PROTHROMBIN TIME: CPT

## 2023-10-29 PROCEDURE — 93970 EXTREMITY STUDY: CPT | Mod: 26

## 2023-10-29 PROCEDURE — 71045 X-RAY EXAM CHEST 1 VIEW: CPT | Mod: 26

## 2023-10-29 PROCEDURE — 87086 URINE CULTURE/COLONY COUNT: CPT

## 2023-10-29 PROCEDURE — 93970 EXTREMITY STUDY: CPT

## 2023-10-29 PROCEDURE — 80053 COMPREHEN METABOLIC PANEL: CPT

## 2023-10-29 PROCEDURE — 99285 EMERGENCY DEPT VISIT HI MDM: CPT

## 2023-10-29 PROCEDURE — 73552 X-RAY EXAM OF FEMUR 2/>: CPT | Mod: RT

## 2023-10-29 PROCEDURE — 73502 X-RAY EXAM HIP UNI 2-3 VIEWS: CPT | Mod: 26,RT

## 2023-10-29 PROCEDURE — 73552 X-RAY EXAM OF FEMUR 2/>: CPT | Mod: 26,RT

## 2023-10-29 PROCEDURE — 99284 EMERGENCY DEPT VISIT MOD MDM: CPT | Mod: 25

## 2023-10-29 PROCEDURE — 86900 BLOOD TYPING SEROLOGIC ABO: CPT

## 2023-10-29 PROCEDURE — 86901 BLOOD TYPING SEROLOGIC RH(D): CPT

## 2023-10-29 PROCEDURE — 81001 URINALYSIS AUTO W/SCOPE: CPT

## 2023-10-29 PROCEDURE — 85730 THROMBOPLASTIN TIME PARTIAL: CPT

## 2023-10-29 PROCEDURE — 85025 COMPLETE CBC W/AUTO DIFF WBC: CPT

## 2023-10-29 PROCEDURE — 71045 X-RAY EXAM CHEST 1 VIEW: CPT

## 2023-10-29 PROCEDURE — 84484 ASSAY OF TROPONIN QUANT: CPT

## 2023-10-29 RX ORDER — ENOXAPARIN SODIUM 100 MG/ML
40 INJECTION SUBCUTANEOUS
Refills: 0 | DISCHARGE
End: 2023-11-03

## 2023-10-29 RX ORDER — SERTRALINE 25 MG/1
1 TABLET, FILM COATED ORAL
Refills: 0 | DISCHARGE

## 2023-10-29 RX ORDER — METOPROLOL TARTRATE 50 MG
1 TABLET ORAL
Refills: 0 | DISCHARGE

## 2023-10-29 RX ORDER — LANOLIN ALCOHOL/MO/W.PET/CERES
1 CREAM (GRAM) TOPICAL
Refills: 0 | DISCHARGE

## 2023-10-29 RX ORDER — ACETAMINOPHEN 500 MG
2 TABLET ORAL
Qty: 0 | Refills: 0 | DISCHARGE

## 2023-10-29 RX ORDER — ASCORBIC ACID 60 MG
1 TABLET,CHEWABLE ORAL
Qty: 0 | Refills: 0 | DISCHARGE

## 2023-10-29 RX ORDER — FERROUS SULFATE 325(65) MG
1 TABLET ORAL
Qty: 0 | Refills: 0 | DISCHARGE

## 2023-10-29 NOTE — CONSULT NOTE ADULT - SUBJECTIVE AND OBJECTIVE BOX
SUBJECTIVE:    89yo M hx of CAD s/p CABG, CVA, HTN, HLD, Depression presenting w/ R hip pain. He had a right hip IMN on 10/5/23 w/ Dr. Vazquez and has since had persistent worsening pain on the RLE. Localizes to R lateral thigh and knee. No recent falls or trauma. No numbness or tingling. At baseline ambulates without assistance but has been using cane/walker since surgery.    Vital Signs (24 Hrs):  T(C): 36.6 (10-29-23 @ 09:43), Max: 36.6 (10-29-23 @ 09:43)  HR: 55 (10-29-23 @ 09:43) (55 - 55)  BP: 131/65 (10-29-23 @ 09:43) (131/65 - 131/65)  RR: 18 (10-29-23 @ 09:43) (18 - 18)  SpO2: 88% (10-29-23 @ 09:43) (88% - 88%)  Wt(kg): --    LABS:                          11.7   9.53  )-----------( 361      ( 29 Oct 2023 10:29 )             36.3     10-29    141  |  106  |  16  ----------------------------<  104<H>  4.2   |  30  |  0.66    Ca    8.5      29 Oct 2023 10:29    TPro  6.6  /  Alb  3.0<L>  /  TBili  0.9  /  DBili  x   /  AST  23  /  ALT  16  /  AlkPhos  191<H>  10-29    LIVER FUNCTIONS - ( 29 Oct 2023 10:29 )  Alb: 3.0 g/dL / Pro: 6.6 gm/dL / ALK PHOS: 191 U/L / ALT: 16 U/L / AST: 23 U/L / GGT: x           PT/INR - ( 29 Oct 2023 10:29 )   PT: 12.5 sec;   INR: 1.11 ratio         PTT - ( 29 Oct 2023 10:29 )  PTT:45.0 sec    EXAM:    General: NAD, awake and alert, comfortable    RLE:  Well-healed surgical incisions  No open skin or abrasions  TTP on R lateral thigh and knee, otherwise NTTP  Able to SLR, no pain on axial load or log roll  Compartments soft and noncompressible, no calf pain  Motor: Fires HF/KE/Gsc/TA/EHL/FHL  Sensory: SILT SPN/DPN/Saph/Gera/Tib  + DP and PT pulses    Secondary Assessment:  NC/AT, NTTP of clavicles, NTTP of C-,T-,L-Spine, NTTP of Pelvis  UEs: NTTP of Shoulders, Elbows, Wrists, Hands; NT with AROM/PROM of Shoulders, Elbows, Wrists, Hands; AIN/PIN/Med/Uln/Msc/Rad/Ax intact  LLE: Able to SLR, NT with Log Roll, NT with Heel Strike, NTTP of Hip, Knee, Ankle, Foot; NT with AROM/PROM of Hip, Knee, Ankle, Foot; Q/H/Gsc/TA/EHL/FHL intact    IMAGING:    XR Pelvis/L Hip/L Femur: S/p IMN. Well-placed implant. No loss of fixation/reduction.    ASSESSMENT/PLAN:    89yo M s/p R hip IMN w/ Dr. Vazquez on 10/5/23 here for persistent RLE pain. Exam and HPI c/w postoperative pain that is not well controlled.    -Per ED likely plan to admit to medicine, appreciate primary management  -WBAT  -Recommend PT/OT daily while inpatient  -Multimodal analgesia  -DVT ppx: per primary  -BL LE US ordered to r/o DVT, will FU  -If further concerns re: pain and mobility can consider CT or MR of pelvis/hip to evaluate or further injuries  -No acute orthopaedic intervention currently indicated  -Follow up w/ Dr. Vazquez upon discharge within 1-2 weeks  -D/w Dr. Vazquez who agrees w/ plan, will update as needed

## 2023-10-29 NOTE — ED PROVIDER NOTE - PROGRESS NOTE DETAILS
Lot 251  Greenish, brown stool  Negative Bette Dhaliwal:  Lot 251  Greenish, brown stool  Negative Bette Dhaliwal: Called and d/w daughter who saw some blood in the patient's diaper and sent in. Recently home from rehab. Endorses increased pain on R side. Will XR hip and consult ortho. Bette Dhaliwal: Ortho consult called, will come to see the patient. Bette Dhaliwal: Ortho came down to evaluate the patient, no acute ortho intervention needed. Bette Dhaliwal: SW evaluated patient. Daughter can take the patient, will facilitate transport. DO López: Received signout from Dr. Dhaliwal follow-up urinalysis.  Patient has slight blood in UA but otherwise no signs of infection.  Ambulance here for transport home.  Patient stable for discharge.

## 2023-10-29 NOTE — ED ADULT NURSE NOTE - OBJECTIVE STATEMENT
Pt BIBEMS with c/o rectal bleeding. Per EMS pt started to have rectal bleeding this morning, pt recently started on Lovenox. Pt denies any SOB, CP, or dizziness. no other complaints at this time

## 2023-10-29 NOTE — PROGRESS NOTE ADULT - SUBJECTIVE AND OBJECTIVE BOX
SUBJECTIVE:    89yo M hx of CAD s/p CABG, CVA, HTN, HLD, Depression presenting w/ R hip pain. He had a right hip IMN on 10/5/23 w/ Dr. Vazquez and has since had persistent worsening pain on the RLE. Localizes to R lateral thigh and knee. No recent falls or trauma. No numbness or tingling. At baseline ambulates without assistance but has been using cane/walker since surgery.    Vital Signs (24 Hrs):  T(C): 36.6 (10-29-23 @ 09:43), Max: 36.6 (10-29-23 @ 09:43)  HR: 55 (10-29-23 @ 09:43) (55 - 55)  BP: 131/65 (10-29-23 @ 09:43) (131/65 - 131/65)  RR: 18 (10-29-23 @ 09:43) (18 - 18)  SpO2: 88% (10-29-23 @ 09:43) (88% - 88%)  Wt(kg): --    LABS:                          11.7   9.53  )-----------( 361      ( 29 Oct 2023 10:29 )             36.3     10-29    141  |  106  |  16  ----------------------------<  104<H>  4.2   |  30  |  0.66    Ca    8.5      29 Oct 2023 10:29    TPro  6.6  /  Alb  3.0<L>  /  TBili  0.9  /  DBili  x   /  AST  23  /  ALT  16  /  AlkPhos  191<H>  10-29    LIVER FUNCTIONS - ( 29 Oct 2023 10:29 )  Alb: 3.0 g/dL / Pro: 6.6 gm/dL / ALK PHOS: 191 U/L / ALT: 16 U/L / AST: 23 U/L / GGT: x           PT/INR - ( 29 Oct 2023 10:29 )   PT: 12.5 sec;   INR: 1.11 ratio         PTT - ( 29 Oct 2023 10:29 )  PTT:45.0 sec    EXAM:    General: NAD, awake and alert, comfortable    RLE:  Well-healed surgical incisions  No open skin or abrasions  TTP on R lateral thigh and knee, otherwise NTTP  Able to SLR, no pain on axial load or log roll  Compartments soft and noncompressible, no calf pain  Motor: Fires HF/KE/Gsc/TA/EHL/FHL  Sensory: SILT SPN/DPN/Saph/Gera/Tib  + DP and PT pulses    Secondary Assessment:  NC/AT, NTTP of clavicles, NTTP of C-,T-,L-Spine, NTTP of Pelvis  UEs: NTTP of Shoulders, Elbows, Wrists, Hands; NT with AROM/PROM of Shoulders, Elbows, Wrists, Hands; AIN/PIN/Med/Uln/Msc/Rad/Ax intact  LLE: Able to SLR, NT with Log Roll, NT with Heel Strike, NTTP of Hip, Knee, Ankle, Foot; NT with AROM/PROM of Hip, Knee, Ankle, Foot; Q/H/Gsc/TA/EHL/FHL intact    IMAGING:    XR Pelvis/L Hip/L Femur: S/p IMN. Well-placed implant. No loss of fixation/reduction.    ASSESSMENT/PLAN:    89yo M s/p R hip IMN w/ Dr. Vazquez on 10/5/23 here for persistent RLE pain. Exam and HPI c/w postoperative pain that is not well controlled.    -Per ED likely plan to admit to medicine, appreciate primary management  -WBAT  -Recommend PT/OT daily while inpatient  -Multimodal analgesia  -DVT ppx: per primary  -BL LE US ordered to r/o DVT, will FU  -If further concerns re: pain and mobility can consider CT or MR of pelvis/hip to evaluate or further injuries  -No acute orthopaedic intervention currently indicated  -Follow up w/ Dr. Vazquez upon discharge within 1-2 weeks  -D/w Dr. Vazquez who agrees w/ plan, will update as needed

## 2023-10-29 NOTE — ED ADULT NURSE NOTE - TEMPLATE
Render Risk Assessment In Note?: no Additional Notes: Notes that this can be treated cosmetically for $150 for 10; $250 for 20 \\n\\nFirst time tx.  Settings on Lo 1.4. Detail Level: Simple General

## 2023-10-29 NOTE — PHARMACOTHERAPY INTERVENTION NOTE - COMMENTS
Medication complete, reviewed medications with patient provided med list from [facility] and confirmed with doctor first med hx.

## 2023-10-29 NOTE — ED ADULT NURSE NOTE - NSFALLRISKINTERV_ED_ALL_ED
Assistance OOB with selected safe patient handling equipment if applicable/Assistance with ambulation/Communicate fall risk and risk factors to all staff, patient, and family/Monitor gait and stability/Provide visual cue: yellow wristband, yellow gown, etc/Reinforce activity limits and safety measures with patient and family/Call bell, personal items and telephone in reach/Instruct patient to call for assistance before getting out of bed/chair/stretcher/Non-slip footwear applied when patient is off stretcher/[place] to call system/Physically safe environment - no spills, clutter or unnecessary equipment/Purposeful Proactive Rounding/Room/bathroom lighting operational, light cord in reach

## 2023-10-29 NOTE — ED PROVIDER NOTE - SKIN, MLM
R Hip post-surgical scar with oozing. Skin normal color for race, warm, dry and intact. No evidence of rash. R Hip post-surgical scar with oozing. Skin normal color for race, warm, dry

## 2023-10-29 NOTE — ED ADULT TRIAGE NOTE - CHIEF COMPLAINT QUOTE
Pt BIBEMS with c/o rectal bleeding. Per EMS pt started to have rectal bleeding this morning, pt recently started on Lovenox. Pt denies any SOB, CP, or dizziness.

## 2023-10-29 NOTE — ED PROVIDER NOTE - OBJECTIVE STATEMENT
Pt is a 89y male with a PMH of HTN, HLD, CAD, depression, s/p CABG presents to the ED BIBEMS c/o rectal bleeding onset this morning. Pt is not sure why he is here. Report from NH states the patient presented for bloody stools. Patient recently started on Lovenox. Denies any SOB, CP, dizziness, fever, chills, n/v/d, abdominal pain. NKA.

## 2023-10-29 NOTE — ED PROVIDER NOTE - NSFOLLOWUPINSTRUCTIONS_ED_ALL_ED_FT
Hematuria    WHAT YOU NEED TO KNOW:    What is hematuria? Hematuria is blood in your urine. Your urine may be bright red to dark brown.    What other signs and symptoms might I have with hematuria?    Fever    Nausea and vomiting    Pain or bruising on your lower back or sides    Pain or burning when you urinate    More urination than usual, or the need to urinate right away    Blood clots in the toilet after you urinate  What causes hematuria? Ask your healthcare provider for more information about these and other causes of hematuria:    Urinary tract infection    Kidney or bladder stones    Swollen prostate    Kidney disease    Abdomen or pelvic injury    Kidney, bladder, or prostate cancer    Intense exercise  How is hematuria diagnosed? Your healthcare provider will ask when you first saw a change in the color of your urine. Tell him or her about any medical conditions or medicines you take. Some medicines can damage your kidneys or increase your risk for bleeding. You may need any of the following:    Blood and urine tests may show infection and how well your kidneys are working.    An ultrasound or CT may show the cause of your hematuria. You may be given contrast liquid to help your urinary tract show up better in the pictures. Tell the healthcare provider if you have ever had an allergic reaction to contrast liquid.    A cystoscopy may show problems inside your bladder. The cystoscope is a long tube with a lens and a light on the end.  How is hematuria treated? Hematuria may go away without treatment. You may need medicines to treat an infection. Treatment depends on the cause of your hematuria. Ask your healthcare provider for more information about the treatment you may need.    How can I manage my symptoms? Drink liquids as directed. You may need to drink extra liquids to help flush the blood from your body through your urine. Water is the best liquid to drink. Ask how much liquid to drink each day and which liquids are best for you.    When should I seek immediate care?    You have blood in your urine after a new injury, such as a fall.    You have severe back or side pain that does not go away with treatment.  When should I call my doctor?    You are urinating very small amounts or not at all.    You feel like you cannot empty your bladder.    You have a fever that gets worse or does not go away with treatment.    You cannot keep liquids or medicines down.    Your urine gets darker, even after you drink extra liquids.    You have questions or concerns about your condition, treatment, or care.  CARE AGREEMENT:    You have the right to help plan your care. Learn about your health condition and how it may be treated. Discuss treatment options with your healthcare providers to decide what care you want to receive. You always have the right to refuse treatment.

## 2023-10-29 NOTE — CHART NOTE - NSCHARTNOTEFT_GEN_A_CORE
met f2f with client-n poor historian- call placed to client's daughter Kathryn Tafoya . Discussed  discharge plan- daughter states has p hha is looking into long term care placement. Explained client will likely be dc'd today if labs and urine are stable. Daughter in agreement to send client home. Daughter requesting ambulance transport- . Transportation set up for 14:30- ed -nurse and daughter aware.

## 2023-10-29 NOTE — ED PROVIDER NOTE - GASTROINTESTINAL, MLM
Oozing abdomen from Lovenox. Abdomen soft, non-tender, no guarding. bruise lower abdomen from Lovenox. Abdomen soft, non-tender, no guarding.

## 2023-10-30 LAB
CULTURE RESULTS: NO GROWTH — SIGNIFICANT CHANGE UP
CULTURE RESULTS: NO GROWTH — SIGNIFICANT CHANGE UP
SPECIMEN SOURCE: SIGNIFICANT CHANGE UP
SPECIMEN SOURCE: SIGNIFICANT CHANGE UP

## 2024-01-01 NOTE — ED ADULT TRIAGE NOTE - IDEAL BODY WEIGHT(KG)
May need to start BP medication. Counseled on DASH diet and routine aerobic exercise. To check BP at home daily and call Dr. Marks or us if greater than 130/80. May need ACE inhibitor or ARB given DM.   62 (0) blue, pale

## 2024-02-26 NOTE — ED ADULT NURSE NOTE - NS ED NURSE LEVEL OF CONSCIOUSNESS MENTAL STATUS
normal, alert, pleasant, well nourished, in no acute distress, well developed, well nourished, ambulating without difficulty
Awake/Alert

## 2025-07-03 NOTE — ED ADULT NURSE NOTE - NSFALLHARMRISKINTERV_ED_ALL_ED
Satisfactory Assistance OOB with selected safe patient handling equipment if applicable/Assistance with ambulation/Communicate risk of Fall with Harm to all staff, patient, and family/Encourage patient to sit up slowly, dangle for a short time, stand at bedside before walking/Monitor gait and stability/Orthostatic vital signs/Provide patient with walking aids/Provide visual cue: red socks, yellow wristband, yellow gown, etc/Reinforce activity limits and safety measures with patient and family/Bed in lowest position, wheels locked, appropriate side rails in place/Call bell, personal items and telephone in reach/Instruct patient to call for assistance before getting out of bed/chair/stretcher/Non-slip footwear applied when patient is off stretcher/[place] to call system/Physically safe environment - no spills, clutter or unnecessary equipment/Purposeful Proactive Rounding/Room/bathroom lighting operational, light cord in reach